# Patient Record
Sex: MALE | Race: WHITE | NOT HISPANIC OR LATINO | Employment: OTHER | URBAN - METROPOLITAN AREA
[De-identification: names, ages, dates, MRNs, and addresses within clinical notes are randomized per-mention and may not be internally consistent; named-entity substitution may affect disease eponyms.]

---

## 2023-02-09 PROBLEM — D16.4 OSTEOMA OF NASAL SINUS: Status: ACTIVE | Noted: 2023-02-09

## 2023-02-09 PROBLEM — H61.21 EXCESSIVE CERUMEN IN EAR CANAL, RIGHT: Status: ACTIVE | Noted: 2023-02-09

## 2023-02-09 PROBLEM — I25.10 CORONARY ARTERY DISEASE: Status: ACTIVE | Noted: 2023-02-09

## 2023-02-09 PROBLEM — K21.9 GERD (GASTROESOPHAGEAL REFLUX DISEASE): Status: ACTIVE | Noted: 2023-02-09

## 2023-02-09 PROBLEM — E55.9 VITAMIN D DEFICIENCY: Status: ACTIVE | Noted: 2023-02-09

## 2023-02-09 PROBLEM — D32.0 CEREBRAL MENINGIOMA (MULTI): Status: ACTIVE | Noted: 2023-02-09

## 2023-02-09 PROBLEM — M79.671 RIGHT FOOT PAIN: Status: ACTIVE | Noted: 2023-02-09

## 2023-02-09 PROBLEM — I87.2 VENOUS INSUFFICIENCY OF BOTH LOWER EXTREMITIES: Status: ACTIVE | Noted: 2023-02-09

## 2023-02-09 PROBLEM — R22.42 SUBCUTANEOUS MASS OF LEFT LOWER EXTREMITY: Status: ACTIVE | Noted: 2023-02-09

## 2023-02-09 PROBLEM — R60.0 BILATERAL EDEMA OF LOWER EXTREMITY: Status: ACTIVE | Noted: 2023-02-09

## 2023-02-09 PROBLEM — E78.5 HYPERLIPIDEMIA: Status: ACTIVE | Noted: 2023-02-09

## 2023-02-09 PROBLEM — R94.31 ABNORMAL EKG: Status: ACTIVE | Noted: 2023-02-09

## 2023-02-09 PROBLEM — G89.29 CHRONIC NECK PAIN: Status: ACTIVE | Noted: 2023-02-09

## 2023-02-09 PROBLEM — M47.816 DJD (DEGENERATIVE JOINT DISEASE), LUMBAR: Status: ACTIVE | Noted: 2023-02-09

## 2023-02-09 PROBLEM — J34.1 MUCOUS RETENTION CYST OF MAXILLARY SINUS: Status: ACTIVE | Noted: 2023-02-09

## 2023-02-09 PROBLEM — I77.819 AORTIC DILATATION (CMS-HCC): Status: ACTIVE | Noted: 2023-02-09

## 2023-02-09 PROBLEM — H93.19 TINNITUS: Status: ACTIVE | Noted: 2023-02-09

## 2023-02-09 PROBLEM — M47.812 DJD (DEGENERATIVE JOINT DISEASE), CERVICAL: Status: ACTIVE | Noted: 2023-02-09

## 2023-02-09 PROBLEM — H90.3 SENSORINEURAL HEARING LOSS (SNHL), BILATERAL: Status: ACTIVE | Noted: 2023-02-09

## 2023-02-09 PROBLEM — I80.02 SUPERFICIAL PHLEBITIS AND THROMBOPHLEBITIS OF LEFT LEG: Status: ACTIVE | Noted: 2023-02-09

## 2023-02-09 PROBLEM — H93.293 ABNORMAL AUDITORY PERCEPTION OF BOTH EARS: Status: ACTIVE | Noted: 2023-02-09

## 2023-02-09 PROBLEM — I10 BENIGN ESSENTIAL HYPERTENSION: Status: ACTIVE | Noted: 2023-02-09

## 2023-02-09 PROBLEM — I77.9 BILATERAL CAROTID ARTERY DISEASE (CMS-HCC): Status: ACTIVE | Noted: 2023-02-09

## 2023-02-09 PROBLEM — E66.9 OBESITY (BMI 30-39.9): Status: ACTIVE | Noted: 2023-02-09

## 2023-02-09 PROBLEM — R42 DIZZINESS: Status: ACTIVE | Noted: 2023-02-09

## 2023-02-09 PROBLEM — H81.10 BENIGN PAROXYSMAL POSITIONAL VERTIGO: Status: ACTIVE | Noted: 2023-02-09

## 2023-02-09 PROBLEM — M54.2 CHRONIC NECK PAIN: Status: ACTIVE | Noted: 2023-02-09

## 2023-02-09 PROBLEM — R73.01 IMPAIRED FASTING GLUCOSE: Status: ACTIVE | Noted: 2023-02-09

## 2023-02-09 RX ORDER — ASPIRIN 81 MG/1
TABLET ORAL
COMMUNITY
Start: 2006-03-08

## 2023-02-09 RX ORDER — ATORVASTATIN CALCIUM 80 MG/1
1 TABLET, FILM COATED ORAL DAILY
COMMUNITY
End: 2023-05-25 | Stop reason: SDUPTHER

## 2023-02-09 RX ORDER — EZETIMIBE 10 MG/1
1 TABLET ORAL DAILY
COMMUNITY
Start: 2021-05-24 | End: 2023-09-26 | Stop reason: SDUPTHER

## 2023-02-09 RX ORDER — METOPROLOL SUCCINATE 25 MG/1
1 TABLET, EXTENDED RELEASE ORAL DAILY
COMMUNITY
End: 2023-09-26 | Stop reason: SDUPTHER

## 2023-02-09 RX ORDER — AMLODIPINE BESYLATE 10 MG/1
10 TABLET ORAL DAILY
COMMUNITY
Start: 2009-03-11 | End: 2023-09-26 | Stop reason: SDUPTHER

## 2023-03-15 LAB
ALANINE AMINOTRANSFERASE (SGPT) (U/L) IN SER/PLAS: 27 U/L (ref 10–52)
ALBUMIN (G/DL) IN SER/PLAS: 4.5 G/DL (ref 3.4–5)
ALKALINE PHOSPHATASE (U/L) IN SER/PLAS: 64 U/L (ref 33–136)
ANION GAP IN SER/PLAS: 13 MMOL/L (ref 10–20)
ASPARTATE AMINOTRANSFERASE (SGOT) (U/L) IN SER/PLAS: 20 U/L (ref 9–39)
BILIRUBIN TOTAL (MG/DL) IN SER/PLAS: 0.9 MG/DL (ref 0–1.2)
CALCIDIOL (25 OH VITAMIN D3) (NG/ML) IN SER/PLAS: 19 NG/ML
CALCIUM (MG/DL) IN SER/PLAS: 9.1 MG/DL (ref 8.6–10.3)
CARBON DIOXIDE, TOTAL (MMOL/L) IN SER/PLAS: 27 MMOL/L (ref 21–32)
CHLORIDE (MMOL/L) IN SER/PLAS: 105 MMOL/L (ref 98–107)
CHOLESTEROL (MG/DL) IN SER/PLAS: 126 MG/DL (ref 0–199)
CHOLESTEROL IN HDL (MG/DL) IN SER/PLAS: 49.3 MG/DL
CHOLESTEROL/HDL RATIO: 2.6
CREATININE (MG/DL) IN SER/PLAS: 0.91 MG/DL (ref 0.5–1.3)
ERYTHROCYTE DISTRIBUTION WIDTH (RATIO) BY AUTOMATED COUNT: 13.2 % (ref 11.5–14.5)
ERYTHROCYTE MEAN CORPUSCULAR HEMOGLOBIN CONCENTRATION (G/DL) BY AUTOMATED: 32.3 G/DL (ref 32–36)
ERYTHROCYTE MEAN CORPUSCULAR VOLUME (FL) BY AUTOMATED COUNT: 97 FL (ref 80–100)
ERYTHROCYTES (10*6/UL) IN BLOOD BY AUTOMATED COUNT: 4.78 X10E12/L (ref 4.5–5.9)
ESTIMATED AVERAGE GLUCOSE FOR HBA1C: 128 MG/DL
GFR MALE: 85 ML/MIN/1.73M2
GLUCOSE (MG/DL) IN SER/PLAS: 109 MG/DL (ref 74–99)
HEMATOCRIT (%) IN BLOOD BY AUTOMATED COUNT: 46.2 % (ref 41–52)
HEMOGLOBIN (G/DL) IN BLOOD: 14.9 G/DL (ref 13.5–17.5)
HEMOGLOBIN A1C/HEMOGLOBIN TOTAL IN BLOOD: 6.1 %
LDL: 58 MG/DL (ref 0–99)
LEUKOCYTES (10*3/UL) IN BLOOD BY AUTOMATED COUNT: 7.7 X10E9/L (ref 4.4–11.3)
PLATELETS (10*3/UL) IN BLOOD AUTOMATED COUNT: 211 X10E9/L (ref 150–450)
POTASSIUM (MMOL/L) IN SER/PLAS: 4.7 MMOL/L (ref 3.5–5.3)
PROSTATE SPECIFIC ANTIGEN,SCREEN: 0.41 NG/ML (ref 0–4)
PROTEIN TOTAL: 7 G/DL (ref 6.4–8.2)
SODIUM (MMOL/L) IN SER/PLAS: 140 MMOL/L (ref 136–145)
THYROTROPIN (MIU/L) IN SER/PLAS BY DETECTION LIMIT <= 0.05 MIU/L: 2.49 MIU/L (ref 0.44–3.98)
TRIGLYCERIDE (MG/DL) IN SER/PLAS: 95 MG/DL (ref 0–149)
UREA NITROGEN (MG/DL) IN SER/PLAS: 13 MG/DL (ref 6–23)
VLDL: 19 MG/DL (ref 0–40)

## 2023-03-22 ENCOUNTER — OFFICE VISIT (OUTPATIENT)
Dept: PRIMARY CARE | Facility: CLINIC | Age: 78
End: 2023-03-22
Payer: MEDICARE

## 2023-03-22 VITALS
HEART RATE: 56 BPM | HEIGHT: 66 IN | DIASTOLIC BLOOD PRESSURE: 74 MMHG | WEIGHT: 215 LBS | OXYGEN SATURATION: 97 % | RESPIRATION RATE: 15 BRPM | TEMPERATURE: 97.1 F | SYSTOLIC BLOOD PRESSURE: 150 MMHG | BODY MASS INDEX: 34.55 KG/M2

## 2023-03-22 DIAGNOSIS — E55.9 VITAMIN D DEFICIENCY: ICD-10-CM

## 2023-03-22 DIAGNOSIS — R73.01 IMPAIRED FASTING GLUCOSE: ICD-10-CM

## 2023-03-22 DIAGNOSIS — E78.2 MIXED HYPERLIPIDEMIA: ICD-10-CM

## 2023-03-22 DIAGNOSIS — D32.0 CEREBRAL MENINGIOMA (MULTI): ICD-10-CM

## 2023-03-22 DIAGNOSIS — I10 BENIGN ESSENTIAL HYPERTENSION: ICD-10-CM

## 2023-03-22 DIAGNOSIS — Z00.00 MEDICARE ANNUAL WELLNESS VISIT, SUBSEQUENT: Primary | ICD-10-CM

## 2023-03-22 DIAGNOSIS — I65.23 BILATERAL CAROTID ARTERY STENOSIS: ICD-10-CM

## 2023-03-22 PROBLEM — M79.671 RIGHT FOOT PAIN: Status: RESOLVED | Noted: 2023-02-09 | Resolved: 2023-03-22

## 2023-03-22 PROBLEM — H61.21 EXCESSIVE CERUMEN IN EAR CANAL, RIGHT: Status: RESOLVED | Noted: 2023-02-09 | Resolved: 2023-03-22

## 2023-03-22 PROCEDURE — 1159F MED LIST DOCD IN RCRD: CPT | Performed by: FAMILY MEDICINE

## 2023-03-22 PROCEDURE — G0439 PPPS, SUBSEQ VISIT: HCPCS | Performed by: FAMILY MEDICINE

## 2023-03-22 PROCEDURE — 1170F FXNL STATUS ASSESSED: CPT | Performed by: FAMILY MEDICINE

## 2023-03-22 PROCEDURE — 1160F RVW MEDS BY RX/DR IN RCRD: CPT | Performed by: FAMILY MEDICINE

## 2023-03-22 PROCEDURE — 3078F DIAST BP <80 MM HG: CPT | Performed by: FAMILY MEDICINE

## 2023-03-22 PROCEDURE — 1036F TOBACCO NON-USER: CPT | Performed by: FAMILY MEDICINE

## 2023-03-22 PROCEDURE — 99214 OFFICE O/P EST MOD 30 MIN: CPT | Performed by: FAMILY MEDICINE

## 2023-03-22 PROCEDURE — 3077F SYST BP >= 140 MM HG: CPT | Performed by: FAMILY MEDICINE

## 2023-03-22 RX ORDER — ACETAMINOPHEN 500 MG
5000 TABLET ORAL DAILY
COMMUNITY

## 2023-03-22 RX ORDER — CHLORTHALIDONE 25 MG/1
25 TABLET ORAL DAILY
Qty: 90 TABLET | Refills: 1 | Status: SHIPPED | OUTPATIENT
Start: 2023-03-22 | End: 2023-09-26 | Stop reason: SDUPTHER

## 2023-03-22 ASSESSMENT — ENCOUNTER SYMPTOMS
CHILLS: 0
OCCASIONAL FEELINGS OF UNSTEADINESS: 0
SHORTNESS OF BREATH: 0
DEPRESSION: 0
CHEST TIGHTNESS: 0
PALPITATIONS: 0
ABDOMINAL PAIN: 0
LOSS OF SENSATION IN FEET: 0
ARTHRALGIAS: 0
FEVER: 0
CONFUSION: 0

## 2023-03-22 ASSESSMENT — PATIENT HEALTH QUESTIONNAIRE - PHQ9
SUM OF ALL RESPONSES TO PHQ9 QUESTIONS 1 AND 2: 0
2. FEELING DOWN, DEPRESSED OR HOPELESS: NOT AT ALL
1. LITTLE INTEREST OR PLEASURE IN DOING THINGS: NOT AT ALL

## 2023-03-22 ASSESSMENT — ACTIVITIES OF DAILY LIVING (ADL)
BATHING: INDEPENDENT
MANAGING_FINANCES: INDEPENDENT
DRESSING: INDEPENDENT
TAKING_MEDICATION: INDEPENDENT
GROCERY_SHOPPING: INDEPENDENT
DOING_HOUSEWORK: INDEPENDENT

## 2023-03-22 NOTE — ASSESSMENT & PLAN NOTE
Patient refuses flu vaccine  Refuses pneumonia vaccine  Refuses colon cancer screening at this time

## 2023-03-22 NOTE — ASSESSMENT & PLAN NOTE
HTN not to goal start chlorthalidone 25 mg once a day and continue other current medication.  Return in 8 weeks for BP recheck

## 2023-03-22 NOTE — PROGRESS NOTES
"Subjective   Reason for Visit: Neftali Schmitt is an 77 y.o. male here for a Medicare Wellness visit.     Past Medical, Surgical, and Family History reviewed and updated in chart.    Reviewed all medications by prescribing practitioner or clinical pharmacist (such as prescriptions, OTCs, herbal therapies and supplements) and documented in the medical record.    HPI patient today for follow-up of ongoing healthcare issues and review of recent lab work.  Overall is been feeling okay denies any headaches or dizziness.  Since he has been in last he has been evaluated by ENT without acute issues noted also has followed up with vascular surgery had a CT angio of the neck to follow-up on abnormalities noted on carotid ultrasound.  Findings stable and he says he needs to follow-up with vascular surgery in 1 year.    Patient Care Team:  Kiran Beard MD as PCP - General  Kiran Beard MD as PCP - [employer] PCP     Review of Systems   Constitutional:  Negative for chills and fever.   HENT:  Negative for congestion and ear pain.    Eyes:  Negative for visual disturbance.   Respiratory:  Negative for chest tightness and shortness of breath.    Cardiovascular:  Negative for chest pain and palpitations.   Gastrointestinal:  Negative for abdominal pain.   Musculoskeletal:  Negative for arthralgias.   Skin:  Negative for pallor.   Psychiatric/Behavioral:  Negative for confusion.        Objective   Vitals:  Pulse 56   Temp 36.2 °C (97.1 °F)   Resp 15   Ht 1.676 m (5' 6\")   Wt 97.5 kg (215 lb)   SpO2 97%   BMI 34.70 kg/m²       Physical Exam  Vitals and nursing note reviewed.   Constitutional:       General: He is not in acute distress.     Appearance: Normal appearance. He is not ill-appearing.   HENT:      Head: Normocephalic and atraumatic.      Right Ear: Tympanic membrane, ear canal and external ear normal.      Left Ear: Tympanic membrane, ear canal and external ear normal.      Mouth/Throat:     "  Pharynx: Oropharynx is clear.   Eyes:      Extraocular Movements: Extraocular movements intact.   Cardiovascular:      Rate and Rhythm: Normal rate and regular rhythm.      Pulses: Normal pulses.      Heart sounds: Normal heart sounds.   Pulmonary:      Effort: Pulmonary effort is normal.      Breath sounds: Normal breath sounds.   Abdominal:      General: Abdomen is flat. Bowel sounds are normal.      Palpations: Abdomen is soft.      Tenderness: There is no abdominal tenderness.   Musculoskeletal:         General: Normal range of motion.      Cervical back: Neck supple.   Skin:     General: Skin is warm.   Neurological:      Mental Status: He is alert and oriented to person, place, and time. Mental status is at baseline.   Psychiatric:         Mood and Affect: Mood normal.       Return to office 8 weeks for blood pressure recheck  Return to office 6 months with repeat fasting labs  Continue to follow with specialist including vascular surgery and cardiology  Recent labs and imaging studies were reviewed with the patient in detail and all questions were answered.  Assessment/Plan   Problem List Items Addressed This Visit       Benign essential hypertension    Current Assessment & Plan     HTN not to goal start chlorthalidone 25 mg once a day and continue other current medication.  Return in 8 weeks for BP recheck         Relevant Orders    Follow Up In Primary Care    Follow Up In Primary Care    Comprehensive Metabolic Panel    Cerebral meningioma (CMS/HCC)    Overview     Head CT 1/11/2023 stable         Current Assessment & Plan     Clinically stable recent CT of the head 1/11/2023 stable         Hyperlipidemia    Current Assessment & Plan     Lipids stable continue atorvastatin and Zetia along with dietary modification         Relevant Orders    Comprehensive Metabolic Panel    Lipid Panel    Impaired fasting glucose    Current Assessment & Plan     A1c 6.1% continue dietary modification         Relevant  Orders    Comprehensive Metabolic Panel    Hemoglobin A1C    Vitamin D deficiency    Current Assessment & Plan     D deficiency start vitamin D3 5000 units daily         Relevant Orders    Vitamin D 1,25 Dihydroxy    Bilateral carotid artery disease (CMS/HCC)    Overview     Bilateral carotid ultrasound 1/24/2023  CT angio of neck 2/21/2023  Patient follows with vascular surgery/Dr. Inman         Current Assessment & Plan     Clinically stable following with vascular surgery  Bilateral carotid ultrasound 1/24/2023 showing evidence of plaque  CT angio of the neck 2/21/2023 showing evidence of stenosis evaluated by vascular surgery.  Patient to follow-up with vascular surgery with follow-up studies in 1 year         Medicare annual wellness visit, subsequent - Primary    Current Assessment & Plan     Patient refuses flu vaccine  Refuses pneumonia vaccine  Refuses colon cancer screening at this time

## 2023-03-22 NOTE — ASSESSMENT & PLAN NOTE
Clinically stable following with vascular surgery  Bilateral carotid ultrasound 1/24/2023 showing evidence of plaque  CT angio of the neck 2/21/2023 showing evidence of stenosis evaluated by vascular surgery.  Patient to follow-up with vascular surgery with follow-up studies in 1 year

## 2023-05-22 ENCOUNTER — OFFICE VISIT (OUTPATIENT)
Dept: PRIMARY CARE | Facility: CLINIC | Age: 78
End: 2023-05-22
Payer: MEDICARE

## 2023-05-22 VITALS
BODY MASS INDEX: 34.86 KG/M2 | OXYGEN SATURATION: 97 % | HEART RATE: 57 BPM | SYSTOLIC BLOOD PRESSURE: 141 MMHG | WEIGHT: 216 LBS | TEMPERATURE: 97.1 F | DIASTOLIC BLOOD PRESSURE: 65 MMHG | RESPIRATION RATE: 14 BRPM

## 2023-05-22 DIAGNOSIS — I10 BENIGN ESSENTIAL HYPERTENSION: Primary | ICD-10-CM

## 2023-05-22 DIAGNOSIS — G56.03 BILATERAL CARPAL TUNNEL SYNDROME: ICD-10-CM

## 2023-05-22 PROCEDURE — 1036F TOBACCO NON-USER: CPT | Performed by: FAMILY MEDICINE

## 2023-05-22 PROCEDURE — 99213 OFFICE O/P EST LOW 20 MIN: CPT | Performed by: FAMILY MEDICINE

## 2023-05-22 PROCEDURE — 1160F RVW MEDS BY RX/DR IN RCRD: CPT | Performed by: FAMILY MEDICINE

## 2023-05-22 PROCEDURE — 3077F SYST BP >= 140 MM HG: CPT | Performed by: FAMILY MEDICINE

## 2023-05-22 PROCEDURE — 3078F DIAST BP <80 MM HG: CPT | Performed by: FAMILY MEDICINE

## 2023-05-22 PROCEDURE — 1159F MED LIST DOCD IN RCRD: CPT | Performed by: FAMILY MEDICINE

## 2023-05-22 ASSESSMENT — ENCOUNTER SYMPTOMS
CHILLS: 0
PALPITATIONS: 0
CHEST TIGHTNESS: 0
NUMBNESS: 1
ARTHRALGIAS: 0
ABDOMINAL PAIN: 0
FEVER: 0
SHORTNESS OF BREATH: 0
CONFUSION: 0

## 2023-05-22 NOTE — PROGRESS NOTES
Subjective   Patient ID: Neftali Schmitt is a 77 y.o. male who presents for Follow-up (8 week B/P).    HPI   Patient today for follow-up on his blood pressure states he is tolerating medications he is monitoring his blood pressure at home typically gets in the 130s over 70s.  Occasionally he says he will see a 140 over 70s.  Related to this he has been noticing problems with numbness and tingling in both of his hands mainly his thumb index and middle fingers bilaterally.  He is left-hand dominant.  Review of Systems   Constitutional:  Negative for chills and fever.   HENT:  Negative for congestion and ear pain.    Eyes:  Negative for visual disturbance.   Respiratory:  Negative for chest tightness and shortness of breath.    Cardiovascular:  Negative for chest pain and palpitations.   Gastrointestinal:  Negative for abdominal pain.   Musculoskeletal:  Negative for arthralgias.   Skin:  Negative for pallor.   Neurological:  Positive for numbness.   Psychiatric/Behavioral:  Negative for confusion.        Objective   /65   Pulse 57   Temp 36.2 °C (97.1 °F)   Resp 14   Wt 98 kg (216 lb)   SpO2 97%   BMI 34.86 kg/m²     Physical Exam  Constitutional:       Appearance: Normal appearance.   HENT:      Head: Normocephalic.      Nose: No congestion.   Cardiovascular:      Rate and Rhythm: Normal rate and regular rhythm.      Pulses: Normal pulses.      Heart sounds: Normal heart sounds.   Pulmonary:      Effort: Pulmonary effort is normal. No respiratory distress.      Breath sounds: Normal breath sounds.   Musculoskeletal:         General: Tenderness present.      Comments: Positive Tinel sign bilaterally at the level of the wrists.   Neurological:      Mental Status: He is alert. Mental status is at baseline.       Blood pressure improved we will continue current antibiotic hypertensive medication.  Bilateral upper extremity EMG further plans pending results likely suspecting carpal tunnel  syndrome.  Follow-up appointment in September with repeat fasting labs  Assessment/Plan   Problem List Items Addressed This Visit       Benign essential hypertension - Primary     BP improved continue current antihypertensive medications and continue blood pressure log.  Bring blood pressure cuff and readings to next appointment         Bilateral carpal tunnel syndrome     Bilateral upper extremity EMG further plans pending results.         Relevant Orders    EMG & nerve conduction

## 2023-05-22 NOTE — ASSESSMENT & PLAN NOTE
BP improved continue current antihypertensive medications and continue blood pressure log.  Bring blood pressure cuff and readings to next appointment

## 2023-05-25 ENCOUNTER — TELEPHONE (OUTPATIENT)
Dept: PRIMARY CARE | Facility: CLINIC | Age: 78
End: 2023-05-25
Payer: MEDICARE

## 2023-05-25 DIAGNOSIS — E78.49 OTHER HYPERLIPIDEMIA: ICD-10-CM

## 2023-05-25 RX ORDER — ATORVASTATIN CALCIUM 80 MG/1
80 TABLET, FILM COATED ORAL DAILY
Qty: 90 TABLET | Refills: 3 | Status: SHIPPED | OUTPATIENT
Start: 2023-05-25

## 2023-05-25 NOTE — TELEPHONE ENCOUNTER
Rx Refill Request Telephone Encounter    Name:  Neftali Schmitt  :  [id number]  Medication Name:        Atorvastatin Calcium 80 MG Oral Tablet 1 tab taken daily                Specific Pharmacy location:   [facility] in [place]    Date of last appointment:  2023  Date of next appointment:  2023  Best number to reach patient:  [phone]

## 2023-09-20 ENCOUNTER — LAB (OUTPATIENT)
Dept: LAB | Facility: LAB | Age: 78
End: 2023-09-20
Payer: MEDICARE

## 2023-09-20 DIAGNOSIS — E78.2 MIXED HYPERLIPIDEMIA: ICD-10-CM

## 2023-09-20 DIAGNOSIS — I10 BENIGN ESSENTIAL HYPERTENSION: ICD-10-CM

## 2023-09-20 DIAGNOSIS — R73.01 IMPAIRED FASTING GLUCOSE: ICD-10-CM

## 2023-09-20 DIAGNOSIS — E55.9 VITAMIN D DEFICIENCY: ICD-10-CM

## 2023-09-20 LAB
ALANINE AMINOTRANSFERASE (SGPT) (U/L) IN SER/PLAS: 47 U/L (ref 10–52)
ALBUMIN (G/DL) IN SER/PLAS: 4.5 G/DL (ref 3.4–5)
ALKALINE PHOSPHATASE (U/L) IN SER/PLAS: 69 U/L (ref 33–136)
ANION GAP IN SER/PLAS: 11 MMOL/L (ref 10–20)
ASPARTATE AMINOTRANSFERASE (SGOT) (U/L) IN SER/PLAS: 29 U/L (ref 9–39)
BILIRUBIN TOTAL (MG/DL) IN SER/PLAS: 1 MG/DL (ref 0–1.2)
CALCIUM (MG/DL) IN SER/PLAS: 9.3 MG/DL (ref 8.6–10.3)
CARBON DIOXIDE, TOTAL (MMOL/L) IN SER/PLAS: 32 MMOL/L (ref 21–32)
CHLORIDE (MMOL/L) IN SER/PLAS: 102 MMOL/L (ref 98–107)
CHOLESTEROL (MG/DL) IN SER/PLAS: 147 MG/DL (ref 0–199)
CHOLESTEROL IN HDL (MG/DL) IN SER/PLAS: 49 MG/DL
CHOLESTEROL/HDL RATIO: 3
CREATININE (MG/DL) IN SER/PLAS: 0.99 MG/DL (ref 0.5–1.3)
ESTIMATED AVERAGE GLUCOSE FOR HBA1C: 140 MG/DL
GFR MALE: 77 ML/MIN/1.73M2
GLUCOSE (MG/DL) IN SER/PLAS: 110 MG/DL (ref 74–99)
HEMOGLOBIN A1C/HEMOGLOBIN TOTAL IN BLOOD: 6.5 %
LDL: 72 MG/DL (ref 0–99)
POTASSIUM (MMOL/L) IN SER/PLAS: 4.2 MMOL/L (ref 3.5–5.3)
PROTEIN TOTAL: 6.7 G/DL (ref 6.4–8.2)
SODIUM (MMOL/L) IN SER/PLAS: 141 MMOL/L (ref 136–145)
TRIGLYCERIDE (MG/DL) IN SER/PLAS: 128 MG/DL (ref 0–149)
UREA NITROGEN (MG/DL) IN SER/PLAS: 17 MG/DL (ref 6–23)
VLDL: 26 MG/DL (ref 0–40)

## 2023-09-20 PROCEDURE — 36415 COLL VENOUS BLD VENIPUNCTURE: CPT

## 2023-09-20 PROCEDURE — 83036 HEMOGLOBIN GLYCOSYLATED A1C: CPT

## 2023-09-20 PROCEDURE — 80053 COMPREHEN METABOLIC PANEL: CPT

## 2023-09-20 PROCEDURE — 82652 VIT D 1 25-DIHYDROXY: CPT

## 2023-09-20 PROCEDURE — 80061 LIPID PANEL: CPT

## 2023-09-23 LAB — VITAMIN D 1,25-DIHYDROXY: 23.5 PG/ML (ref 19.9–79.3)

## 2023-09-26 ENCOUNTER — OFFICE VISIT (OUTPATIENT)
Dept: PRIMARY CARE | Facility: CLINIC | Age: 78
End: 2023-09-26
Payer: MEDICARE

## 2023-09-26 VITALS
SYSTOLIC BLOOD PRESSURE: 138 MMHG | TEMPERATURE: 97.5 F | RESPIRATION RATE: 14 BRPM | HEART RATE: 59 BPM | DIASTOLIC BLOOD PRESSURE: 86 MMHG | OXYGEN SATURATION: 96 % | BODY MASS INDEX: 35.67 KG/M2 | WEIGHT: 221 LBS

## 2023-09-26 DIAGNOSIS — E11.9 DIABETES MELLITUS TYPE 2, DIET-CONTROLLED (MULTI): Primary | ICD-10-CM

## 2023-09-26 DIAGNOSIS — E78.2 MIXED HYPERLIPIDEMIA: ICD-10-CM

## 2023-09-26 DIAGNOSIS — G56.03 BILATERAL CARPAL TUNNEL SYNDROME: ICD-10-CM

## 2023-09-26 DIAGNOSIS — I10 BENIGN ESSENTIAL HYPERTENSION: ICD-10-CM

## 2023-09-26 DIAGNOSIS — Z13.29 THYROID DISORDER SCREEN: ICD-10-CM

## 2023-09-26 DIAGNOSIS — I77.819 AORTIC DILATATION (CMS-HCC): ICD-10-CM

## 2023-09-26 DIAGNOSIS — E66.01 SEVERE OBESITY (BMI 35.0-39.9) WITH COMORBIDITY (MULTI): ICD-10-CM

## 2023-09-26 DIAGNOSIS — E55.9 VITAMIN D DEFICIENCY: ICD-10-CM

## 2023-09-26 DIAGNOSIS — Z12.5 SCREENING FOR PROSTATE CANCER: ICD-10-CM

## 2023-09-26 PROBLEM — R73.01 IMPAIRED FASTING GLUCOSE: Status: RESOLVED | Noted: 2023-02-09 | Resolved: 2023-09-26

## 2023-09-26 PROBLEM — I80.02 SUPERFICIAL PHLEBITIS AND THROMBOPHLEBITIS OF LEFT LEG: Status: RESOLVED | Noted: 2023-02-09 | Resolved: 2023-09-26

## 2023-09-26 PROBLEM — R60.0 BILATERAL EDEMA OF LOWER EXTREMITY: Status: RESOLVED | Noted: 2023-02-09 | Resolved: 2023-09-26

## 2023-09-26 PROBLEM — H93.293 ABNORMAL AUDITORY PERCEPTION OF BOTH EARS: Status: RESOLVED | Noted: 2023-02-09 | Resolved: 2023-09-26

## 2023-09-26 PROCEDURE — 1036F TOBACCO NON-USER: CPT | Performed by: FAMILY MEDICINE

## 2023-09-26 PROCEDURE — 1159F MED LIST DOCD IN RCRD: CPT | Performed by: FAMILY MEDICINE

## 2023-09-26 PROCEDURE — 1160F RVW MEDS BY RX/DR IN RCRD: CPT | Performed by: FAMILY MEDICINE

## 2023-09-26 PROCEDURE — 3075F SYST BP GE 130 - 139MM HG: CPT | Performed by: FAMILY MEDICINE

## 2023-09-26 PROCEDURE — 3079F DIAST BP 80-89 MM HG: CPT | Performed by: FAMILY MEDICINE

## 2023-09-26 PROCEDURE — 99214 OFFICE O/P EST MOD 30 MIN: CPT | Performed by: FAMILY MEDICINE

## 2023-09-26 RX ORDER — CHLORTHALIDONE 25 MG/1
25 TABLET ORAL DAILY
Qty: 90 TABLET | Refills: 3 | Status: SHIPPED | OUTPATIENT
Start: 2023-09-26 | End: 2024-09-25

## 2023-09-26 RX ORDER — METOPROLOL SUCCINATE 25 MG/1
25 TABLET, EXTENDED RELEASE ORAL DAILY
Qty: 90 TABLET | Refills: 3 | Status: SHIPPED | OUTPATIENT
Start: 2023-09-26 | End: 2024-09-25

## 2023-09-26 RX ORDER — AMLODIPINE BESYLATE 10 MG/1
10 TABLET ORAL DAILY
Qty: 90 TABLET | Refills: 3 | Status: SHIPPED | OUTPATIENT
Start: 2023-09-26 | End: 2024-09-25

## 2023-09-26 RX ORDER — EZETIMIBE 10 MG/1
10 TABLET ORAL DAILY
Qty: 90 TABLET | Refills: 3 | Status: SHIPPED | OUTPATIENT
Start: 2023-09-26 | End: 2024-09-25

## 2023-09-26 RX ORDER — MECLIZINE HYDROCHLORIDE 25 MG/1
TABLET ORAL
COMMUNITY
Start: 2022-12-07

## 2023-09-26 ASSESSMENT — ENCOUNTER SYMPTOMS
PALPITATIONS: 0
ABDOMINAL PAIN: 0
CONFUSION: 0
ARTHRALGIAS: 0
CHILLS: 0
CHEST TIGHTNESS: 0
SHORTNESS OF BREATH: 0
FEVER: 0

## 2023-09-26 NOTE — PROGRESS NOTES
Subjective   Patient ID: Neftali Schmitt is a 78 y.o. male who presents for Follow-up (6 month).    HPI   Patient today for follow-up of ongoing healthcare issues and review of lab work for most part states he has been doing okay.  Does his carpal tunnel symptoms are stable.  He has had some difficulty trying to get an appointment due to availability.  Review of Systems   Constitutional:  Negative for chills and fever.   HENT:  Negative for congestion and ear pain.    Eyes:  Negative for visual disturbance.   Respiratory:  Negative for chest tightness and shortness of breath.    Cardiovascular:  Negative for chest pain and palpitations.   Gastrointestinal:  Negative for abdominal pain.   Musculoskeletal:  Negative for arthralgias.   Skin:  Negative for pallor.   Psychiatric/Behavioral:  Negative for confusion.        Objective   /86   Pulse 59   Temp 36.4 °C (97.5 °F)   Resp 14   Wt 100 kg (221 lb)   SpO2 96%   BMI 35.67 kg/m²     Physical Exam  Vitals and nursing note reviewed.   Constitutional:       General: He is not in acute distress.     Appearance: Normal appearance. He is not ill-appearing.   HENT:      Head: Normocephalic and atraumatic.      Right Ear: Tympanic membrane, ear canal and external ear normal.      Left Ear: Tympanic membrane, ear canal and external ear normal.      Mouth/Throat:      Pharynx: Oropharynx is clear.   Eyes:      Extraocular Movements: Extraocular movements intact.   Cardiovascular:      Rate and Rhythm: Normal rate and regular rhythm.      Pulses: Normal pulses.      Heart sounds: Normal heart sounds.   Pulmonary:      Effort: Pulmonary effort is normal.      Breath sounds: Normal breath sounds.   Abdominal:      General: Abdomen is flat. Bowel sounds are normal.      Palpations: Abdomen is soft.      Tenderness: There is no abdominal tenderness.   Musculoskeletal:         General: Normal range of motion.      Cervical back: Neck supple.   Skin:     General: Skin  is warm.   Neurological:      Mental Status: He is alert and oriented to person, place, and time. Mental status is at baseline.   Psychiatric:         Mood and Affect: Mood normal.       Recent Results (from the past 1008 hour(s))   Comprehensive Metabolic Panel    Collection Time: 09/20/23  7:07 AM   Result Value Ref Range    Glucose 110 (H) 74 - 99 mg/dL    Sodium 141 136 - 145 mmol/L    Potassium 4.2 3.5 - 5.3 mmol/L    Chloride 102 98 - 107 mmol/L    Bicarbonate 32 21 - 32 mmol/L    Anion Gap 11 10 - 20 mmol/L    Urea Nitrogen 17 6 - 23 mg/dL    Creatinine 0.99 0.50 - 1.30 mg/dL    GFR MALE 77 >90 mL/min/1.73m2    Calcium 9.3 8.6 - 10.3 mg/dL    Albumin 4.5 3.4 - 5.0 g/dL    Alkaline Phosphatase 69 33 - 136 U/L    Total Protein 6.7 6.4 - 8.2 g/dL    AST 29 9 - 39 U/L    Total Bilirubin 1.0 0.0 - 1.2 mg/dL    ALT (SGPT) 47 10 - 52 U/L   Hemoglobin A1C    Collection Time: 09/20/23  7:07 AM   Result Value Ref Range    Hemoglobin A1C 6.5 (A) %    Estimated Average Glucose 140 MG/DL   Lipid Panel    Collection Time: 09/20/23  7:07 AM   Result Value Ref Range    Cholesterol 147 0 - 199 mg/dL    HDL 49.0 mg/dL    Cholesterol/HDL Ratio 3.0     LDL 72 0 - 99 mg/dL    VLDL 26 0 - 40 mg/dL    Triglycerides 128 0 - 149 mg/dL   Vitamin D 1,25 Dihydroxy    Collection Time: 09/20/23  7:07 AM   Result Value Ref Range    Vit D, 1,25-Dihydroxy 23.5 19.9 - 79.3 pg/mL     Recent labs reviewed with patient  Necessary refills provided    We reviewed recommendations with regards to immunizations that he is eligible for he verbalizes understanding but refuses any and all immunizations at this time    Declines colon cancer screening despite understanding potential line malignancy which lead to his death    Return to office 6 months with repeat fasting labs    Assessment/Plan   Problem List Items Addressed This Visit             ICD-10-CM    Aortic dilatation (CMS/HCC) I77.819     Mild dilatation by previous echo clinically stable  follows with cardiology         Benign essential hypertension I10     Continue current medications refills provided         Relevant Medications    amLODIPine (Norvasc) 10 mg tablet    ezetimibe (Zetia) 10 mg tablet    metoprolol succinate XL (Toprol-XL) 25 mg 24 hr tablet    chlorthalidone (Hygroton) 25 mg tablet    Other Relevant Orders    Follow Up In Primary Care - Established    CBC    Comprehensive Metabolic Panel    Hyperlipidemia E78.5     Continue Lipitor 80 mg daily along with Zetia 10 mg daily and dietary modifications         Relevant Orders    Follow Up In Primary Care - Established    Comprehensive Metabolic Panel    Lipid Panel    Vitamin D deficiency E55.9     Continue to monitor supplement as needed         Relevant Orders    Comprehensive Metabolic Panel    Vitamin D 25-Hydroxy,Total (for eval of Vitamin D levels)    Bilateral carpal tunnel syndrome G56.03     Patient has had difficulty scheduling EMG additional information provided for patient of alternative sites for testing.  Symptomatically says things are stable.         Screening for prostate cancer Z12.5     Screening PSA         Relevant Orders    Prostate Specific Antigen, Screen    Thyroid disorder screen Z13.29     TSH with reflex         Relevant Orders    TSH with reflex to Free T4 if abnormal    Diabetes mellitus type 2, diet-controlled (CMS/[facility]) - Primary E11.9     A1c 6.5% stressed the importance of following diabetic diet which was reviewed with patient         Relevant Orders    Follow Up In Primary Care - Established    CBC    Comprehensive Metabolic Panel    Hemoglobin A1C    Severe obesity (BMI 35.0-39.9) with comorbidity (CMS/HCC) E66.01     Reviewed lifestyle modifications with regards to dietary adjustments and weight loss strategies

## 2023-10-09 ENCOUNTER — ANCILLARY PROCEDURE (OUTPATIENT)
Dept: RADIOLOGY | Facility: CLINIC | Age: 78
End: 2023-10-09
Payer: MEDICARE

## 2023-10-09 ENCOUNTER — TELEPHONE (OUTPATIENT)
Dept: PRIMARY CARE | Facility: CLINIC | Age: 78
End: 2023-10-09
Payer: MEDICARE

## 2023-10-09 DIAGNOSIS — M25.562 ACUTE PAIN OF LEFT KNEE: Primary | ICD-10-CM

## 2023-10-09 DIAGNOSIS — M25.562 ACUTE PAIN OF LEFT KNEE: ICD-10-CM

## 2023-10-09 PROCEDURE — 73562 X-RAY EXAM OF KNEE 3: CPT | Mod: LEFT SIDE | Performed by: RADIOLOGY

## 2023-10-09 PROCEDURE — 73562 X-RAY EXAM OF KNEE 3: CPT | Mod: LT

## 2023-10-09 NOTE — TELEPHONE ENCOUNTER
Patient called in regarding his left knee. He said he had some shooting pain right below the left knee cap and is requesting an xray order to get this looked at. Please advise.

## 2023-12-07 ENCOUNTER — OFFICE VISIT (OUTPATIENT)
Dept: CARDIOLOGY | Facility: CLINIC | Age: 78
End: 2023-12-07
Payer: MEDICARE

## 2023-12-07 VITALS
HEART RATE: 60 BPM | BODY MASS INDEX: 34.61 KG/M2 | WEIGHT: 221 LBS | DIASTOLIC BLOOD PRESSURE: 52 MMHG | SYSTOLIC BLOOD PRESSURE: 130 MMHG

## 2023-12-07 DIAGNOSIS — I10 BENIGN ESSENTIAL HYPERTENSION: ICD-10-CM

## 2023-12-07 DIAGNOSIS — G56.03 BILATERAL CARPAL TUNNEL SYNDROME: ICD-10-CM

## 2023-12-07 DIAGNOSIS — I77.9 BILATERAL CAROTID ARTERY DISEASE, UNSPECIFIED TYPE (CMS-HCC): ICD-10-CM

## 2023-12-07 DIAGNOSIS — E78.2 MIXED HYPERLIPIDEMIA: ICD-10-CM

## 2023-12-07 DIAGNOSIS — I25.10 CORONARY ARTERY DISEASE INVOLVING NATIVE CORONARY ARTERY OF NATIVE HEART WITHOUT ANGINA PECTORIS: Primary | ICD-10-CM

## 2023-12-07 PROCEDURE — 3075F SYST BP GE 130 - 139MM HG: CPT | Performed by: PHYSICIAN ASSISTANT

## 2023-12-07 PROCEDURE — 1160F RVW MEDS BY RX/DR IN RCRD: CPT | Performed by: PHYSICIAN ASSISTANT

## 2023-12-07 PROCEDURE — 99213 OFFICE O/P EST LOW 20 MIN: CPT | Performed by: PHYSICIAN ASSISTANT

## 2023-12-07 PROCEDURE — 1159F MED LIST DOCD IN RCRD: CPT | Performed by: PHYSICIAN ASSISTANT

## 2023-12-07 PROCEDURE — 3078F DIAST BP <80 MM HG: CPT | Performed by: PHYSICIAN ASSISTANT

## 2023-12-07 PROCEDURE — 1036F TOBACCO NON-USER: CPT | Performed by: PHYSICIAN ASSISTANT

## 2023-12-07 ASSESSMENT — ENCOUNTER SYMPTOMS
VOMITING: 0
NAUSEA: 0
SHORTNESS OF BREATH: 0
ORTHOPNEA: 0
WHEEZING: 0
ABDOMINAL PAIN: 0
PALPITATIONS: 0
DIARRHEA: 0
FEVER: 0
DYSURIA: 0
WEAKNESS: 0

## 2023-12-07 NOTE — PATIENT INSTRUCTIONS
Please continue your current medications.  If you have any change in your cardiorespiratory status please call the office right away.  We will arrange for your yearly visit with your cardiologist.

## 2023-12-07 NOTE — PROGRESS NOTES
Cardiology Follow Up  Chief Complaint:   Patient is here for 6 month office visit.      History Of Present Illness:    This is a 77-year-old male here for follow-up regarding his history of coronary artery disease status post bypass done in 2007, carotid/vertebral artery disease followed by vascular surgery, hypertension, dyslipidemia, lower extremity edema, and obesity. The patient was last evaluated by me in December 2022 at which time I asked that he follow-up in 6 months. Patient was subsequently seen by vascular surgery in February 2023 at which time a CTA of the neck was ordered. CBC done in March 2023 showed a hemoglobin of 14.9, TSH was 2.49, hemoglobin A1c was 6.1%, CMP showed normal serum sodium and potassium with a serum creatinine 0.91. Lipid panel done in March 2023 showed an LDL cholesterol of 58 and triglycerides of 95 while on atorvastatin 80 mg daily and Zetia 10 mg daily. CTA of the neck done in February 2023 showed complex fibrocalcific plaque noted in the left carotid bifurcation/proximal internal carotid artery causing 35 to 40% narrowing, focal ectasia of the left proximal internal carotid artery of up to 9.6 mm, fibrocalcific plaque at the origins of the vertebral arteries causing moderate to severe narrowing on the right side and moderate narrowing on the left side. ECG done today showed sinus rhythm with a heart rate of 60 bpm, LVH and possible inferior infarct age undetermined. The patient reports that he has been feeling generally well from the cardiac standpoint. He denies any new chest pain, shortness of breath, palpitations and lightheadedness. He states that he takes all of his medications as prescribed. During my exam, he was resting comfortably on the exam table.   12-7-23:    Is a very pleasant 78-year-old known to Dr. Stewart.  Above history as noted.  Patient states in the interval 6 months since June doing very well without chest pain palpitations shortness of breath lightheadedness  dizziness syncope or falling.  Biggest complaint seems to be related to numbness tingling and discomfort especially later in the evening as he thinks he has carpal tunnel syndrome.  Patient has been taking his medication as prescribed with controlled blood pressures at home.  Patient denies any other new cardiac complaints or concerns and feels otherwise that he has been in his usual state of health.  Last Recorded Vitals:  Vitals:    12/07/23 0901   BP: 130/52   Pulse: 60   Weight: 100 kg (221 lb)       Past Medical History:  He has a past medical history of Abnormal auditory perception of both ears (02/09/2023), Immunization not carried out because of patient refusal, Personal history of other diseases of the circulatory system, Personal history of other diseases of the musculoskeletal system and connective tissue, and Personal history of other drug therapy.    Past Surgical History:  He has a past surgical history that includes Other surgical history (07/17/2020) and CT angio neck (2/21/2023).      Social History:  He reports that he quit smoking about 33 years ago. His smoking use included cigarettes. He has been exposed to tobacco smoke. He has never used smokeless tobacco. He reports current alcohol use of about 2.0 standard drinks of alcohol per week. He reports that he does not use drugs.    Family History:  Family History   Problem Relation Name Age of Onset    Cancer Mother      Cancer Father          Allergies:  Losartan    Outpatient Medications:  Current Outpatient Medications   Medication Instructions    amLODIPine (NORVASC) 10 mg, oral, Daily    aspirin 81 mg EC tablet     atorvastatin (LIPITOR) 80 mg, oral, Daily    chlorthalidone (HYGROTON) 25 mg, oral, Daily    cholecalciferol (VITAMIN D3) 5,000 Units, oral, Daily    ezetimibe (ZETIA) 10 mg, oral, Daily    meclizine (Antivert) 25 mg tablet     metoprolol succinate XL (TOPROL-XL) 25 mg, oral, Daily     Review of Systems   Constitutional: Negative  for fever and malaise/fatigue.   Cardiovascular:  Negative for chest pain, orthopnea and palpitations.   Respiratory:  Negative for shortness of breath and wheezing.    Skin:  Negative for itching and rash.   Musculoskeletal:         Symptoms of carpal tunnel   Gastrointestinal:  Negative for abdominal pain, diarrhea, nausea and vomiting.   Genitourinary:  Negative for dysuria.   Neurological:  Negative for weakness.      Physical Exam  Constitutional:       General: He is not in acute distress.     Appearance: Normal appearance.   HENT:      Mouth/Throat:      Mouth: Mucous membranes are moist.   Neck:      Comments: No JVD  Cardiovascular:      Rate and Rhythm: Normal rate and regular rhythm.      Heart sounds: Normal heart sounds. No murmur heard.  Abdominal:      General: Abdomen is flat. Bowel sounds are normal.      Palpations: Abdomen is soft.   Musculoskeletal:         General: No swelling.   Skin:     General: Skin is warm and dry.   Neurological:      Mental Status: He is alert and oriented to person, place, and time.   Psychiatric:         Mood and Affect: Mood normal.           Last Labs:  CBC -  Lab Results   Component Value Date    WBC 7.7 03/15/2023    HGB 14.9 03/15/2023    HCT 46.2 03/15/2023    MCV 97 03/15/2023     03/15/2023       CMP -  Lab Results   Component Value Date    CALCIUM 9.3 09/20/2023    PROT 6.7 09/20/2023    ALBUMIN 4.5 09/20/2023    AST 29 09/20/2023    ALT 47 09/20/2023    ALKPHOS 69 09/20/2023    BILITOT 1.0 09/20/2023       LIPID PANEL -   Lab Results   Component Value Date    CHOL 147 09/20/2023    TRIG 128 09/20/2023    HDL 49.0 09/20/2023    CHHDL 3.0 09/20/2023    LDLF 72 09/20/2023    VLDL 26 09/20/2023       RENAL FUNCTION PANEL -   Lab Results   Component Value Date    GLUCOSE 110 (H) 09/20/2023     09/20/2023    K 4.2 09/20/2023     09/20/2023    CO2 32 09/20/2023    ANIONGAP 11 09/20/2023    BUN 17 09/20/2023    CREATININE 0.99 09/20/2023    GFRMALE  77 09/20/2023    CALCIUM 9.3 09/20/2023    ALBUMIN 4.5 09/20/2023        Lab Results   Component Value Date    HGBA1C 6.5 (A) 09/20/2023       Last Cardiology Tests:    Echo: 8/21--CONCLUSIONS:   1. The left ventricular systolic function is normal with a 60-65% estimated ejection fraction.         Lab review: I have personally reviewed the laboratory result(s).    Assessment/Plan   Problem List Items Addressed This Visit             ICD-10-CM       Cardiac and Vasculature    Benign essential hypertension I10    Relevant Orders    Follow Up In Cardiology    CAD (coronary artery disease) - Primary I25.10    Relevant Orders    Follow Up In Cardiology    Hyperlipidemia E78.5    Bilateral carotid artery disease (CMS/HCC) I77.9       Neuro    Bilateral carpal tunnel syndrome G56.03    Relevant Orders    Referral to Orthopaedic Surgery   History of CAD--patient with remote bypass surgery back in 2007.  Had a very active summer without exertional complaints or concerns such as chest pain palpitations or shortness of breath.  Blood pressures are controlled on current meds which we will continue.  Patient had labs in September showing total cholesterol of 147 with LDL cholesterol of 72 on current meds which we will continue.  Patient is known to have preserved LV systolic function.  He has no signs or symptoms of CHF.  Hypertension--again blood pressures are very well-controlled at this time.  Continue current medical therapy  History of carotid arterial disease--patient's last CTA in February showed 35 to 40% narrowing on the left side.  He is on good medical regimen with controlled blood pressures.  He has no neurologic symptoms.  Hyperlipidemia--again lipids well-controlled.  He is a non-smoker.  Symptoms of carpal tunnel syndrome--will refer to Dr. Eze Zamorano of orthopedics for evaluation.  Overall this patient is doing very well and in the last 6 months denies any change in cardiorespiratory status.  If there is any  change she will contact the office otherwise we will arrange follow-up with Dr. Stewart in 6 months time.      Eze Langston PA-C  12/7/2023  9:10 AM

## 2023-12-28 ENCOUNTER — TELEPHONE (OUTPATIENT)
Dept: PRIMARY CARE | Facility: CLINIC | Age: 78
End: 2023-12-28
Payer: MEDICARE

## 2023-12-28 DIAGNOSIS — U07.1 COVID: Primary | ICD-10-CM

## 2023-12-28 RX ORDER — PROMETHAZINE HYDROCHLORIDE AND DEXTROMETHORPHAN HYDROBROMIDE 6.25; 15 MG/5ML; MG/5ML
5 SYRUP ORAL EVERY 4 HOURS PRN
Qty: 120 ML | Refills: 1 | Status: SHIPPED | OUTPATIENT
Start: 2023-12-28 | End: 2024-01-31 | Stop reason: WASHOUT

## 2023-12-28 NOTE — TELEPHONE ENCOUNTER
Patient tested positive for COVID yesterday. His symptoms started on Monday evening. Deep Cough, sore throat lots of phlegm headache. No fever     Requesting Paxlovid and something for the cough.    Please advise    [facility] [place]

## 2024-01-03 ENCOUNTER — APPOINTMENT (OUTPATIENT)
Dept: ORTHOPEDIC SURGERY | Facility: CLINIC | Age: 79
End: 2024-01-03
Payer: MEDICARE

## 2024-01-05 DIAGNOSIS — M79.641 BILATERAL HAND PAIN: ICD-10-CM

## 2024-01-05 DIAGNOSIS — M79.642 BILATERAL HAND PAIN: ICD-10-CM

## 2024-01-10 ENCOUNTER — OFFICE VISIT (OUTPATIENT)
Dept: ORTHOPEDIC SURGERY | Facility: CLINIC | Age: 79
End: 2024-01-10
Payer: MEDICARE

## 2024-01-10 ENCOUNTER — ANCILLARY PROCEDURE (OUTPATIENT)
Dept: RADIOLOGY | Facility: CLINIC | Age: 79
End: 2024-01-10
Payer: MEDICARE

## 2024-01-10 VITALS — WEIGHT: 215 LBS | BODY MASS INDEX: 33.74 KG/M2 | HEIGHT: 67 IN

## 2024-01-10 DIAGNOSIS — G56.02 CARPAL TUNNEL SYNDROME OF LEFT WRIST: Primary | ICD-10-CM

## 2024-01-10 DIAGNOSIS — G56.01 CARPAL TUNNEL SYNDROME OF RIGHT WRIST: ICD-10-CM

## 2024-01-10 DIAGNOSIS — G56.03 BILATERAL CARPAL TUNNEL SYNDROME: ICD-10-CM

## 2024-01-10 DIAGNOSIS — M79.641 BILATERAL HAND PAIN: ICD-10-CM

## 2024-01-10 DIAGNOSIS — M79.642 BILATERAL HAND PAIN: ICD-10-CM

## 2024-01-10 PROCEDURE — 99204 OFFICE O/P NEW MOD 45 MIN: CPT | Performed by: ORTHOPAEDIC SURGERY

## 2024-01-10 PROCEDURE — 1036F TOBACCO NON-USER: CPT | Performed by: ORTHOPAEDIC SURGERY

## 2024-01-10 PROCEDURE — 73130 X-RAY EXAM OF HAND: CPT | Mod: 50

## 2024-01-10 PROCEDURE — 1125F AMNT PAIN NOTED PAIN PRSNT: CPT | Performed by: ORTHOPAEDIC SURGERY

## 2024-01-10 PROCEDURE — 73130 X-RAY EXAM OF HAND: CPT | Mod: BILATERAL PROCEDURE | Performed by: RADIOLOGY

## 2024-01-10 PROCEDURE — 1159F MED LIST DOCD IN RCRD: CPT | Performed by: ORTHOPAEDIC SURGERY

## 2024-01-10 ASSESSMENT — PAIN - FUNCTIONAL ASSESSMENT: PAIN_FUNCTIONAL_ASSESSMENT: 0-10

## 2024-01-10 ASSESSMENT — PAIN SCALES - GENERAL: PAINLEVEL_OUTOF10: 5 - MODERATE PAIN

## 2024-01-10 NOTE — PROGRESS NOTES
Neftali is a 78 y.o. male referred by his cardiologist for bilateral hand pain for about a year.  He states he has a lot of numbness and tingling that goes into his wrist also.  No testing.  No prior surgery.

## 2024-01-10 NOTE — PROGRESS NOTES
78 y.o. male presents today for evaluation of bilateral hand numbness, tingling, weakness and pain. The patient reports symptoms for months, getting worse.  Pain is controlled.  Reports no previous surgeries, injections or trauma to the area.  Reports pain worse with use, better at rest.  Pain numb and tingly, does not wake them from sleep.   Worse driving a car and talking on a phone.  Worse during the day.  Left worse than right.    Review of Systems    Constitutional: see HPI, no fever, no chills, not feeling tired, no significant weight gain or weight loss.   Eyes: No vision changes  ENT: no nosebleeds.   Cardiovascular: no chest pain.   Respiratory: no shortness of breath and no cough.   Gastrointestinal: no abdominal pain, no nausea, no vomiting and no diarrhea.   Musculoskeletal: per HPI  Neurological: no headache, no gait disturbances  Psychiatric: no depression and no sleep disturbances.   Endocrine: no muscle weakness and no muscle cramps.   Hematologic/Lymphatic: no swollen glands and no tendency for easy bruising or excessive swelling.     Patient's past medical history, past surgical history, allergies, and medications have been reviewed unless otherwise noted in the chart.     Carpal Tunnel Exam  Inspection:  no evidence of infection, no edema, no erythema, no ecchymosis, Palpation:  compartments are soft, no pain with palpation, Range of Motion:  full wrist and finger range of motion, Stability:  no wrist instability detected, Strength:  5/5 APB and intrinsics, Skin:  intact, Vascular:  capillary refill <2 seconds distally, Sensation:  decreased in the median nerve distribution, Test:  positive Tinel's at the Carpal Tunnel, positive Direct Carpal Tunnel Compression Test      Constitutional   General appearance: Alert and in no acute distress. Well developed, well nourished.    Eyes   External Eye, Conjunctiva and lids: Normal external exam - pupils were equal in size, round, reactive to light (PERRL)  with normal accommodation and extraocular movements intact (EOMI).   Ears, Nose, Mouth, and Throat   Hearing: Normal.   Neck   Neck: No neck mass was observed. Supple.   Pulmonary   Respiratory effort: No respiratory distress.   Cardiovascular   Examination of extremities: No peripheral edema.   Neurologic   Sensation: Normal.   Psychiatric   Judgment and insight: Intact.   Orientation to person, place, and time: Alert and oriented x 3.       Mood and affect: Normal.      Bilateral CTS  Based on the history, physical exam and imaging studies above, the patient's presentation is consistent with the above diagnosis.  I had a long discussion with the patient regarding their presentation and the treatment options.  We discussed initial nonoperative versus operative management options as well as potential further diagnostic imaging.  We again discussed her treatment options going forward along with their associated risks and benefits. After thorough discussion, the patient has elected to proceed with conservative management. All questions were answered to the patients satisfaction who seems satisfied with the plan.  They will call the office with any questions/concerns.    Night splint  EMG  FU after EMG - no XR

## 2024-01-24 ENCOUNTER — ANCILLARY PROCEDURE (OUTPATIENT)
Dept: NEUROLOGY | Facility: CLINIC | Age: 79
End: 2024-01-24
Payer: MEDICARE

## 2024-01-24 VITALS
WEIGHT: 220 LBS | TEMPERATURE: 97.7 F | HEART RATE: 65 BPM | SYSTOLIC BLOOD PRESSURE: 148 MMHG | DIASTOLIC BLOOD PRESSURE: 59 MMHG | HEIGHT: 66 IN | BODY MASS INDEX: 35.36 KG/M2

## 2024-01-24 DIAGNOSIS — G56.03 BILATERAL CARPAL TUNNEL SYNDROME: ICD-10-CM

## 2024-01-24 PROCEDURE — 95911 NRV CNDJ TEST 9-10 STUDIES: CPT | Performed by: PSYCHIATRY & NEUROLOGY

## 2024-01-24 PROCEDURE — 95886 MUSC TEST DONE W/N TEST COMP: CPT | Performed by: PSYCHIATRY & NEUROLOGY

## 2024-01-24 NOTE — PROGRESS NOTES
PRELIMINARY EMG REPORT    This study is abnormal.  There is electrophysiological evidence for a median mononeuropathy at both wrists (e.g. carpal tunnel syndrome), moderate-severe on the left and moderate on the right, without active denervation of the thenar muscles.  There is no electrophysiological evidence for another large-fiber peripheral neuropathy, cervical radiculopathy, or brachial plexopathy in either arm.    John Long Jr., M.D., FAAN, FAANEM

## 2024-01-24 NOTE — LETTER
January 24, 2024     Eze Zamorano DO  [address]  [facility] [address]    Patient: Neftali Schmitt   YOB: 1945   Date of Visit: 1/24/2024       Dear Dr. Eze Zamorano DO:    Thank you for referring Neftali Schmitt to me for EMG/NCS. Below are my notes for this visit.  If you have questions, please do not hesitate to call me. I look forward to following your patient along with you.       Sincerely,     John Long Jr., M.D., RUCHI PASCUAL       CC: Kiran Beard MD  ______________________________________________________________________________________    PRELIMINARY EMG REPORT    This study is abnormal.  There is electrophysiological evidence for a median mononeuropathy at both wrists (e.g. carpal tunnel syndrome), moderate-severe on the left and moderate on the right, without active denervation of the thenar muscles.  There is no electrophysiological evidence for another large-fiber peripheral neuropathy, cervical radiculopathy, or brachial plexopathy in either arm.    John Long Jr., M.D., RUCHI PASCUAL

## 2024-01-31 ENCOUNTER — OFFICE VISIT (OUTPATIENT)
Dept: ORTHOPEDIC SURGERY | Facility: CLINIC | Age: 79
End: 2024-01-31
Payer: MEDICARE

## 2024-01-31 ENCOUNTER — OFFICE VISIT (OUTPATIENT)
Dept: PRIMARY CARE | Facility: CLINIC | Age: 79
End: 2024-01-31
Payer: MEDICARE

## 2024-01-31 ENCOUNTER — PREP FOR PROCEDURE (OUTPATIENT)
Dept: ORTHOPEDIC SURGERY | Facility: CLINIC | Age: 79
End: 2024-01-31

## 2024-01-31 VITALS — HEIGHT: 66 IN | BODY MASS INDEX: 35.36 KG/M2 | WEIGHT: 220 LBS

## 2024-01-31 VITALS
TEMPERATURE: 97.6 F | OXYGEN SATURATION: 94 % | RESPIRATION RATE: 14 BRPM | SYSTOLIC BLOOD PRESSURE: 134 MMHG | DIASTOLIC BLOOD PRESSURE: 72 MMHG | HEART RATE: 60 BPM | WEIGHT: 225 LBS | BODY MASS INDEX: 36.32 KG/M2

## 2024-01-31 DIAGNOSIS — I10 BENIGN ESSENTIAL HYPERTENSION: Primary | ICD-10-CM

## 2024-01-31 DIAGNOSIS — H81.10 BENIGN PAROXYSMAL POSITIONAL VERTIGO, UNSPECIFIED LATERALITY: ICD-10-CM

## 2024-01-31 DIAGNOSIS — G56.02 CARPAL TUNNEL SYNDROME OF LEFT WRIST: Primary | ICD-10-CM

## 2024-01-31 DIAGNOSIS — G56.01 CARPAL TUNNEL SYNDROME OF RIGHT WRIST: ICD-10-CM

## 2024-01-31 DIAGNOSIS — I65.23 BILATERAL CAROTID ARTERY STENOSIS: ICD-10-CM

## 2024-01-31 PROCEDURE — 1158F ADVNC CARE PLAN TLK DOCD: CPT | Performed by: FAMILY MEDICINE

## 2024-01-31 PROCEDURE — 1123F ACP DISCUSS/DSCN MKR DOCD: CPT | Performed by: FAMILY MEDICINE

## 2024-01-31 PROCEDURE — 1159F MED LIST DOCD IN RCRD: CPT | Performed by: FAMILY MEDICINE

## 2024-01-31 PROCEDURE — 99214 OFFICE O/P EST MOD 30 MIN: CPT | Performed by: ORTHOPAEDIC SURGERY

## 2024-01-31 PROCEDURE — 3075F SYST BP GE 130 - 139MM HG: CPT | Performed by: FAMILY MEDICINE

## 2024-01-31 PROCEDURE — 1159F MED LIST DOCD IN RCRD: CPT | Performed by: ORTHOPAEDIC SURGERY

## 2024-01-31 PROCEDURE — 1125F AMNT PAIN NOTED PAIN PRSNT: CPT | Performed by: FAMILY MEDICINE

## 2024-01-31 PROCEDURE — 1160F RVW MEDS BY RX/DR IN RCRD: CPT | Performed by: FAMILY MEDICINE

## 2024-01-31 PROCEDURE — 1036F TOBACCO NON-USER: CPT | Performed by: ORTHOPAEDIC SURGERY

## 2024-01-31 PROCEDURE — 1036F TOBACCO NON-USER: CPT | Performed by: FAMILY MEDICINE

## 2024-01-31 PROCEDURE — 3078F DIAST BP <80 MM HG: CPT | Performed by: FAMILY MEDICINE

## 2024-01-31 PROCEDURE — 1125F AMNT PAIN NOTED PAIN PRSNT: CPT | Performed by: ORTHOPAEDIC SURGERY

## 2024-01-31 PROCEDURE — 99213 OFFICE O/P EST LOW 20 MIN: CPT | Performed by: FAMILY MEDICINE

## 2024-01-31 ASSESSMENT — ENCOUNTER SYMPTOMS
RESPIRATORY NEGATIVE: 1
CARDIOVASCULAR NEGATIVE: 1
CONSTITUTIONAL NEGATIVE: 1
DIZZINESS: 1

## 2024-01-31 ASSESSMENT — PAIN - FUNCTIONAL ASSESSMENT: PAIN_FUNCTIONAL_ASSESSMENT: NO/DENIES PAIN

## 2024-01-31 NOTE — PROGRESS NOTES
78 y.o. male presents today for follow up of bilateral hand numbness, tingling, weakness and pain. The patient reports symptoms for months, getting worse.  Pain is controlled.  Reports no previous surgeries, injections or trauma to the area.  Reports pain worse with use, better at rest.  Pain numb and tingly, does not wake them from sleep.   Worse driving a car and talking on a phone.  Worse during the day.  Left worse than right. Got EMG.      Review of Systems    Constitutional: see HPI, no fever, no chills, not feeling tired, no significant weight gain or weight loss.   Eyes: No vision changes  ENT: no nosebleeds.   Cardiovascular: no chest pain.   Respiratory: no shortness of breath and no cough.   Gastrointestinal: no abdominal pain, no nausea, no vomiting and no diarrhea.   Musculoskeletal: per HPI  Neurological: no headache, no gait disturbances  Psychiatric: no depression and no sleep disturbances.   Endocrine: no muscle weakness and no muscle cramps.   Hematologic/Lymphatic: no swollen glands and no tendency for easy bruising or excessive swelling.     Patient's past medical history, past surgical history, allergies, and medications have been reviewed unless otherwise noted in the chart.     Carpal Tunnel Exam  Inspection:  no evidence of infection, no edema, no erythema, no ecchymosis, Palpation:  compartments are soft, no pain with palpation, Range of Motion:  full wrist and finger range of motion, Stability:  no wrist instability detected, Strength:  5/5 APB and intrinsics, Skin:  intact, Vascular:  capillary refill <2 seconds distally, Sensation:  decreased in the median nerve distribution, Test:  positive Tinel's at the Carpal Tunnel, positive Direct Carpal Tunnel Compression Test      Constitutional   General appearance: Alert and in no acute distress. Well developed, well nourished.    Eyes   External Eye, Conjunctiva and lids: Normal external exam - pupils were equal in size, round, reactive to  light (PERRL) with normal accommodation and extraocular movements intact (EOMI).   Ears, Nose, Mouth, and Throat   Hearing: Normal.   Neck   Neck: No neck mass was observed. Supple.   Pulmonary   Respiratory effort: No respiratory distress.   Auscultation of lungs: Clear bilateral breath sounds.   Cardiovascular   Examination of extremities: No peripheral edema.   Auscultation of heart: Heart rate and rhythm were normal   Psychiatric   Judgment and insight: Intact.   Orientation to person, place, and time: Alert and oriented x 3.       Mood and affect: Normal.      EMG -mod severe left, mod right CTS    Bilateral CTS  Surgery discussion: I discussed the diagnosis and treatment options with the patient today along with their associated risks and benefits. After thorough discussion, the patient has elected to proceed with surgical intervention. The patient understands the risks of,including, but not limited to, bleeding, infection, loss of life or limb, pain, permanent numbness, tingling, weakness, loss of motion, failure of the goals of surgery or the potential need for additional surgery. The patient would like to accept these risks and proceed. All questions were answered to the patients satisfaction and the patient seems satisfied with the plan.    Plan left ECTR  FU 2 weeks after - No XR

## 2024-01-31 NOTE — ASSESSMENT & PLAN NOTE
As of today says symptoms have resolved.  He will continue to monitor he has Antivert at home if needed.  We discussed head positioning.

## 2024-01-31 NOTE — PROGRESS NOTES
Follow up to review EMG testing done 01/24/2024.  He has bilateral CTS but he states his left is worse.

## 2024-01-31 NOTE — PROGRESS NOTES
Subjective   Patient ID: Neftali Schmitt is a 78 y.o. male who presents for Follow-up (Dizziness, shakiness yesterday. 3 weeks post COVID).    HPI patient in today stated that he was having some issues with dizziness vertigo which she has had before.  He is also about 3 weeks post COVID.  This is for the most part if he is doing well today his symptoms have resolved.  But he thought he should come in just to get checked out.  He has seen orthopedics and is can have carpal tunnel surgery in a few weeks.  He denies chest pain shortness of breath palpitations syncope or near syncope no nausea no vomiting.  No unusual headaches.    Review of Systems   Constitutional: Negative.    HENT: Negative.     Respiratory: Negative.     Cardiovascular: Negative.    Neurological:  Positive for dizziness.       Objective   /72   Pulse 60   Temp 36.4 °C (97.6 °F)   Resp 14   Wt 102 kg (225 lb)   SpO2 94%   BMI 36.32 kg/m²     Physical Exam  Constitutional:       General: He is not in acute distress.     Appearance: Normal appearance. He is not ill-appearing.   HENT:      Head: Normocephalic.      Right Ear: Tympanic membrane, ear canal and external ear normal.      Left Ear: Tympanic membrane, ear canal and external ear normal.      Nose: Nose normal.   Cardiovascular:      Rate and Rhythm: Normal rate and regular rhythm.      Heart sounds: Normal heart sounds.   Pulmonary:      Effort: Pulmonary effort is normal. No respiratory distress.      Breath sounds: Normal breath sounds.   Musculoskeletal:         General: Normal range of motion.   Neurological:      General: No focal deficit present.      Mental Status: He is oriented to person, place, and time. Mental status is at baseline.      Coordination: Coordination normal.      Gait: Gait normal.     Clinically patient appears to be back to his baseline he will monitor for now use the Antivert if needed we discussed proper head positioning.  Call or go to ER if  anything worsens.  Keep appointment with his vascular surgeon in 4 weeks to follow-up on his carotid artery disease.    Return to our office in approximately 6 weeks for regular appointment along with fasting lab work    Assessment/Plan   Problem List Items Addressed This Visit             ICD-10-CM    Benign essential hypertension - Primary I10     Stable continue current treatment         Benign paroxysmal positional vertigo H81.10     As of today says symptoms have resolved.  He will continue to monitor he has Antivert at home if needed.  We discussed head positioning.         Bilateral carotid artery disease (CMS/[facility]) I77.9     Reviewed previous carotid ultrasound and CT angio of the neck from 2023 with patient.  He has a follow-up appointment with vascular surgery in about 4 weeks.

## 2024-01-31 NOTE — ASSESSMENT & PLAN NOTE
Reviewed previous carotid ultrasound and CT angio of the neck from 2023 with patient.  He has a follow-up appointment with vascular surgery in about 4 weeks.

## 2024-02-16 ENCOUNTER — ANESTHESIA EVENT (OUTPATIENT)
Dept: OPERATING ROOM | Facility: HOSPITAL | Age: 79
End: 2024-02-16
Payer: MEDICARE

## 2024-02-20 ENCOUNTER — PHARMACY VISIT (OUTPATIENT)
Dept: PHARMACY | Facility: CLINIC | Age: 79
End: 2024-02-20
Payer: COMMERCIAL

## 2024-02-20 ENCOUNTER — HOSPITAL ENCOUNTER (OUTPATIENT)
Facility: HOSPITAL | Age: 79
Setting detail: OUTPATIENT SURGERY
Discharge: HOME | End: 2024-02-20
Attending: ORTHOPAEDIC SURGERY | Admitting: ORTHOPAEDIC SURGERY
Payer: MEDICARE

## 2024-02-20 ENCOUNTER — ANESTHESIA (OUTPATIENT)
Dept: OPERATING ROOM | Facility: HOSPITAL | Age: 79
End: 2024-02-20
Payer: MEDICARE

## 2024-02-20 ENCOUNTER — APPOINTMENT (OUTPATIENT)
Dept: CARDIOLOGY | Facility: HOSPITAL | Age: 79
End: 2024-02-20
Payer: MEDICARE

## 2024-02-20 VITALS
BODY MASS INDEX: 36.14 KG/M2 | WEIGHT: 224.87 LBS | OXYGEN SATURATION: 94 % | SYSTOLIC BLOOD PRESSURE: 137 MMHG | RESPIRATION RATE: 16 BRPM | DIASTOLIC BLOOD PRESSURE: 75 MMHG | HEIGHT: 66 IN | TEMPERATURE: 97.5 F | HEART RATE: 58 BPM

## 2024-02-20 DIAGNOSIS — G56.02 CARPAL TUNNEL SYNDROME OF LEFT WRIST: Primary | ICD-10-CM

## 2024-02-20 LAB
ANION GAP SERPL CALC-SCNC: 14 MMOL/L (ref 10–20)
ATRIAL RATE: 58 BPM
BUN SERPL-MCNC: 15 MG/DL (ref 6–23)
CALCIUM SERPL-MCNC: 9.2 MG/DL (ref 8.6–10.3)
CHLORIDE SERPL-SCNC: 103 MMOL/L (ref 98–107)
CO2 SERPL-SCNC: 26 MMOL/L (ref 21–32)
CREAT SERPL-MCNC: 0.86 MG/DL (ref 0.5–1.3)
EGFRCR SERPLBLD CKD-EPI 2021: 88 ML/MIN/1.73M*2
ERYTHROCYTE [DISTWIDTH] IN BLOOD BY AUTOMATED COUNT: 13.5 % (ref 11.5–14.5)
GLUCOSE BLD MANUAL STRIP-MCNC: 110 MG/DL (ref 74–99)
GLUCOSE SERPL-MCNC: 116 MG/DL (ref 74–99)
HCT VFR BLD AUTO: 42.7 % (ref 41–52)
HGB BLD-MCNC: 14.7 G/DL (ref 13.5–17.5)
MCH RBC QN AUTO: 31.4 PG (ref 26–34)
MCHC RBC AUTO-ENTMCNC: 34.4 G/DL (ref 32–36)
MCV RBC AUTO: 91 FL (ref 80–100)
NRBC BLD-RTO: 0 /100 WBCS (ref 0–0)
P AXIS: -4 DEGREES
PLATELET # BLD AUTO: 215 X10*3/UL (ref 150–450)
POTASSIUM SERPL-SCNC: 3.6 MMOL/L (ref 3.5–5.3)
PR INTERVAL: 197 MS
Q ONSET: 249 MS
QRS COUNT: 9 BEATS
QRS DURATION: 127 MS
QT INTERVAL: 463 MS
QTC CALCULATION(BAZETT): 459 MS
QTC FREDERICIA: 460 MS
R AXIS: -8 DEGREES
RBC # BLD AUTO: 4.68 X10*6/UL (ref 4.5–5.9)
SODIUM SERPL-SCNC: 139 MMOL/L (ref 136–145)
T AXIS: 98 DEGREES
T OFFSET: 481 MS
VENTRICULAR RATE: 59 BPM
WBC # BLD AUTO: 7.2 X10*3/UL (ref 4.4–11.3)

## 2024-02-20 PROCEDURE — 2500000005 HC RX 250 GENERAL PHARMACY W/O HCPCS: Performed by: ORTHOPAEDIC SURGERY

## 2024-02-20 PROCEDURE — 2500000004 HC RX 250 GENERAL PHARMACY W/ HCPCS (ALT 636 FOR OP/ED): Performed by: ORTHOPAEDIC SURGERY

## 2024-02-20 PROCEDURE — 2720000007 HC OR 272 NO HCPCS: Performed by: ORTHOPAEDIC SURGERY

## 2024-02-20 PROCEDURE — 93005 ELECTROCARDIOGRAM TRACING: CPT | Mod: 59

## 2024-02-20 PROCEDURE — 3700000001 HC GENERAL ANESTHESIA TIME - INITIAL BASE CHARGE: Performed by: ORTHOPAEDIC SURGERY

## 2024-02-20 PROCEDURE — 2500000001 HC RX 250 WO HCPCS SELF ADMINISTERED DRUGS (ALT 637 FOR MEDICARE OP): Performed by: ANESTHESIOLOGY

## 2024-02-20 PROCEDURE — 85027 COMPLETE CBC AUTOMATED: CPT | Performed by: ANESTHESIOLOGY

## 2024-02-20 PROCEDURE — 93010 ELECTROCARDIOGRAM REPORT: CPT | Performed by: INTERNAL MEDICINE

## 2024-02-20 PROCEDURE — 29848 WRIST ENDOSCOPY/SURGERY: CPT | Performed by: ORTHOPAEDIC SURGERY

## 2024-02-20 PROCEDURE — 2500000004 HC RX 250 GENERAL PHARMACY W/ HCPCS (ALT 636 FOR OP/ED): Performed by: NURSE ANESTHETIST, CERTIFIED REGISTERED

## 2024-02-20 PROCEDURE — 2500000005 HC RX 250 GENERAL PHARMACY W/O HCPCS: Performed by: ANESTHESIOLOGY

## 2024-02-20 PROCEDURE — 3600000008 HC OR TIME - EACH INCREMENTAL 1 MINUTE - PROCEDURE LEVEL THREE: Performed by: ORTHOPAEDIC SURGERY

## 2024-02-20 PROCEDURE — 2500000004 HC RX 250 GENERAL PHARMACY W/ HCPCS (ALT 636 FOR OP/ED): Performed by: ANESTHESIOLOGY

## 2024-02-20 PROCEDURE — RXMED WILLOW AMBULATORY MEDICATION CHARGE

## 2024-02-20 PROCEDURE — 82947 ASSAY GLUCOSE BLOOD QUANT: CPT

## 2024-02-20 PROCEDURE — 7100000002 HC RECOVERY ROOM TIME - EACH INCREMENTAL 1 MINUTE: Performed by: ORTHOPAEDIC SURGERY

## 2024-02-20 PROCEDURE — 7100000009 HC PHASE TWO TIME - INITIAL BASE CHARGE: Performed by: ORTHOPAEDIC SURGERY

## 2024-02-20 PROCEDURE — 2500000005 HC RX 250 GENERAL PHARMACY W/O HCPCS: Performed by: NURSE ANESTHETIST, CERTIFIED REGISTERED

## 2024-02-20 PROCEDURE — 3600000003 HC OR TIME - INITIAL BASE CHARGE - PROCEDURE LEVEL THREE: Performed by: ORTHOPAEDIC SURGERY

## 2024-02-20 PROCEDURE — 3700000002 HC GENERAL ANESTHESIA TIME - EACH INCREMENTAL 1 MINUTE: Performed by: ORTHOPAEDIC SURGERY

## 2024-02-20 PROCEDURE — 80048 BASIC METABOLIC PNL TOTAL CA: CPT | Performed by: ANESTHESIOLOGY

## 2024-02-20 PROCEDURE — 7100000010 HC PHASE TWO TIME - EACH INCREMENTAL 1 MINUTE: Performed by: ORTHOPAEDIC SURGERY

## 2024-02-20 PROCEDURE — 36415 COLL VENOUS BLD VENIPUNCTURE: CPT | Performed by: ANESTHESIOLOGY

## 2024-02-20 PROCEDURE — 7100000001 HC RECOVERY ROOM TIME - INITIAL BASE CHARGE: Performed by: ORTHOPAEDIC SURGERY

## 2024-02-20 RX ORDER — ONDANSETRON HYDROCHLORIDE 2 MG/ML
INJECTION, SOLUTION INTRAVENOUS AS NEEDED
Status: DISCONTINUED | OUTPATIENT
Start: 2024-02-20 | End: 2024-02-20

## 2024-02-20 RX ORDER — ONDANSETRON HYDROCHLORIDE 2 MG/ML
4 INJECTION, SOLUTION INTRAVENOUS ONCE
Status: COMPLETED | OUTPATIENT
Start: 2024-02-20 | End: 2024-02-20

## 2024-02-20 RX ORDER — FAMOTIDINE 10 MG/ML
20 INJECTION INTRAVENOUS ONCE
Status: COMPLETED | OUTPATIENT
Start: 2024-02-20 | End: 2024-02-20

## 2024-02-20 RX ORDER — CEFAZOLIN SODIUM 2 G/100ML
2 INJECTION, SOLUTION INTRAVENOUS EVERY 8 HOURS
Status: DISCONTINUED | OUTPATIENT
Start: 2024-02-20 | End: 2024-02-20 | Stop reason: HOSPADM

## 2024-02-20 RX ORDER — LIDOCAINE HYDROCHLORIDE AND EPINEPHRINE 20; 10 MG/ML; UG/ML
INJECTION, SOLUTION INFILTRATION; PERINEURAL AS NEEDED
Status: DISCONTINUED | OUTPATIENT
Start: 2024-02-20 | End: 2024-02-20 | Stop reason: HOSPADM

## 2024-02-20 RX ORDER — BUPIVACAINE HCL/EPINEPHRINE 0.5-1:200K
VIAL (ML) INJECTION AS NEEDED
Status: DISCONTINUED | OUTPATIENT
Start: 2024-02-20 | End: 2024-02-20 | Stop reason: HOSPADM

## 2024-02-20 RX ORDER — LIDOCAINE HCL/PF 100 MG/5ML
SYRINGE (ML) INTRAVENOUS AS NEEDED
Status: DISCONTINUED | OUTPATIENT
Start: 2024-02-20 | End: 2024-02-20

## 2024-02-20 RX ORDER — PROPOFOL 10 MG/ML
INJECTION, EMULSION INTRAVENOUS CONTINUOUS PRN
Status: DISCONTINUED | OUTPATIENT
Start: 2024-02-20 | End: 2024-02-20

## 2024-02-20 RX ORDER — METOCLOPRAMIDE HYDROCHLORIDE 5 MG/ML
10 INJECTION INTRAMUSCULAR; INTRAVENOUS ONCE
Status: COMPLETED | OUTPATIENT
Start: 2024-02-20 | End: 2024-02-20

## 2024-02-20 RX ORDER — PROPOFOL 10 MG/ML
INJECTION, EMULSION INTRAVENOUS AS NEEDED
Status: DISCONTINUED | OUTPATIENT
Start: 2024-02-20 | End: 2024-02-20

## 2024-02-20 RX ORDER — SODIUM CITRATE AND CITRIC ACID MONOHYDRATE 334; 500 MG/5ML; MG/5ML
30 SOLUTION ORAL ONCE
Status: COMPLETED | OUTPATIENT
Start: 2024-02-20 | End: 2024-02-20

## 2024-02-20 RX ORDER — ONDANSETRON HYDROCHLORIDE 2 MG/ML
4 INJECTION, SOLUTION INTRAVENOUS ONCE AS NEEDED
Status: DISCONTINUED | OUTPATIENT
Start: 2024-02-20 | End: 2024-02-20 | Stop reason: HOSPADM

## 2024-02-20 RX ORDER — MORPHINE SULFATE 2 MG/ML
2 INJECTION, SOLUTION INTRAMUSCULAR; INTRAVENOUS EVERY 5 MIN PRN
Status: DISCONTINUED | OUTPATIENT
Start: 2024-02-20 | End: 2024-02-20 | Stop reason: HOSPADM

## 2024-02-20 RX ORDER — FENTANYL CITRATE 50 UG/ML
INJECTION, SOLUTION INTRAMUSCULAR; INTRAVENOUS AS NEEDED
Status: DISCONTINUED | OUTPATIENT
Start: 2024-02-20 | End: 2024-02-20

## 2024-02-20 RX ORDER — HYDROCODONE BITARTRATE AND ACETAMINOPHEN 5; 325 MG/1; MG/1
1 TABLET ORAL EVERY 6 HOURS PRN
Qty: 10 TABLET | Refills: 0 | Status: SHIPPED | OUTPATIENT
Start: 2024-02-20 | End: 2024-05-07 | Stop reason: WASHOUT

## 2024-02-20 RX ORDER — KETOROLAC TROMETHAMINE 30 MG/ML
INJECTION, SOLUTION INTRAMUSCULAR; INTRAVENOUS AS NEEDED
Status: DISCONTINUED | OUTPATIENT
Start: 2024-02-20 | End: 2024-02-20

## 2024-02-20 RX ORDER — SODIUM CHLORIDE 9 MG/ML
50 INJECTION, SOLUTION INTRAVENOUS CONTINUOUS
Status: DISCONTINUED | OUTPATIENT
Start: 2024-02-20 | End: 2024-02-20 | Stop reason: HOSPADM

## 2024-02-20 RX ADMIN — CEFAZOLIN SODIUM 2 G: 2 INJECTION, SOLUTION INTRAVENOUS at 08:34

## 2024-02-20 RX ADMIN — FAMOTIDINE 20 MG: 10 INJECTION, SOLUTION INTRAVENOUS at 07:41

## 2024-02-20 RX ADMIN — SODIUM CHLORIDE 50 ML/HR: 9 INJECTION, SOLUTION INTRAVENOUS at 07:41

## 2024-02-20 RX ADMIN — PROPOFOL 50 MG: 10 INJECTION, EMULSION INTRAVENOUS at 08:43

## 2024-02-20 RX ADMIN — Medication: at 07:00

## 2024-02-20 RX ADMIN — KETOROLAC TROMETHAMINE 30 MG: 30 INJECTION, SOLUTION INTRAMUSCULAR at 08:46

## 2024-02-20 RX ADMIN — METOCLOPRAMIDE 10 MG: 5 INJECTION, SOLUTION INTRAMUSCULAR; INTRAVENOUS at 07:41

## 2024-02-20 RX ADMIN — PROPOFOL 75 MCG/KG/MIN: 10 INJECTION, EMULSION INTRAVENOUS at 08:36

## 2024-02-20 RX ADMIN — LIDOCAINE HYDROCHLORIDE 100 MG: 20 INJECTION INTRAVENOUS at 08:43

## 2024-02-20 RX ADMIN — ONDANSETRON 4 MG: 2 INJECTION INTRAMUSCULAR; INTRAVENOUS at 08:46

## 2024-02-20 RX ADMIN — ONDANSETRON 4 MG: 2 INJECTION INTRAMUSCULAR; INTRAVENOUS at 07:41

## 2024-02-20 RX ADMIN — SODIUM CITRATE AND CITRIC ACID MONOHYDRATE 30 ML: 500; 334 SOLUTION ORAL at 07:41

## 2024-02-20 RX ADMIN — FENTANYL CITRATE 50 MCG: 50 INJECTION INTRAMUSCULAR; INTRAVENOUS at 08:36

## 2024-02-20 SDOH — HEALTH STABILITY: MENTAL HEALTH: CURRENT SMOKER: 0

## 2024-02-20 ASSESSMENT — PAIN - FUNCTIONAL ASSESSMENT
PAIN_FUNCTIONAL_ASSESSMENT: 0-10
PAIN_FUNCTIONAL_ASSESSMENT: 0-10

## 2024-02-20 ASSESSMENT — COLUMBIA-SUICIDE SEVERITY RATING SCALE - C-SSRS
1. IN THE PAST MONTH, HAVE YOU WISHED YOU WERE DEAD OR WISHED YOU COULD GO TO SLEEP AND NOT WAKE UP?: NO
2. HAVE YOU ACTUALLY HAD ANY THOUGHTS OF KILLING YOURSELF?: NO
6. HAVE YOU EVER DONE ANYTHING, STARTED TO DO ANYTHING, OR PREPARED TO DO ANYTHING TO END YOUR LIFE?: NO

## 2024-02-20 ASSESSMENT — PAIN SCALES - GENERAL
PAINLEVEL_OUTOF10: 0 - NO PAIN
PAIN_LEVEL: 0
PAINLEVEL_OUTOF10: 0 - NO PAIN

## 2024-02-20 NOTE — OP NOTE
ORTHOPEDIC OPERATIVE NOTE    Name: Neftali Schmitt  : 1945  Surgeon: Kelton Zamorano DO  Facility: [facility]  Date of Surgery: 24  ORTHOPEDIC OPERATIVE NOTE    SURGEON:     Kelton Zamorano DO  PRE OP DIAGNOSIS:  Carpal Tunnel Syndrome left Wrist  POST OP DIAGNOSIS:  Same  PROCEDURE:   Endoscopic Carpal Tunnel Release left Wrist    ANESTHESIA:  Local with sedation  ASSISTANT:    SA Duong  COMPLICATIONS:   None.  CONDITION:    Satisfactory to PACU.  BLOOD LOSS: Minimal    INDICATION FOR PROCEDURE: The patient is a 78 y.o. -year-old male who has failed nonsurgical management for left carpal tunnel syndrome including night splints. They had an extensive workup consisting of signs, symptoms, and clinical history of EMG nerve conduction study consistent with left carpal syndrome.  The patient was seen in the office, fully informed risks and benefits of the procedure, and elected to undergo the procedure.    PROCEDURE IN DETAIL: The patient was seen and consented preoperatively with side and site of surgery appropriately marked. The patient was taken back to the operative suite, placed supine on the operative table, and placed on monitor for the duration of case. The patient was administered sedation and monitored throughout the entire surgery by Department of Anesthesia. While sedated, the patient was administered 5 cc of mixed marcaine and lidocaine to the volar aspect of wrist and palm for local anesthesia. The operative upper extremity was then sterilely prepped and draped in sterile orthopedic fashion, elevated with an Esmarch, and tourniquet inflated to 250 mmHg for the duration of case, and time-out was performed confirming site of surgery and surgery to be performed.    A 1-cm transverse incision was made to the ulnar aspect of the palmaris longus at proximal wrist crease. Skin and underlying tissues were sharply dissected down. Hemostasis maintained with bipolar electrocauterization.  Distally based flap of the fascia overlying the median nerve was then released, and under direct visualization, proximal fascia was released with Littler scissors. The elevator and dilator were then placed in the carpal tunnel with appropriate ease of passage and corrugated feel of the undersurface of transcarpal ligament. The endoscope was then placed under direct visualization. The transverse carpal ligament was released in its entirety, confirmed both visually as well as by utilizing endoscope as a probe, which freely passed following release. The nerve was evaluated. No evidence of lesion or adhesion was present.    The wound was irrigated with sterile saline, closed with 5-0 nylon suture. Sterile dressing was then placed of Xeroform and 4x4s affixed with Kerlix and Ace wrap. Tourniquet was deflated, and the patient was taken to PACU in satisfactory condition.    Electronically signed  Kelton Zamorano DO

## 2024-02-20 NOTE — DISCHARGE INSTRUCTIONS
[facility]  [phone]   Fax: [phone] [address]   [address] [facility] -  [address] [place] [id number]    Upper Extremity - Endoscopic Carpal Tunnel Release    1. Dressing    You have a soft bandage over the operative site.  DO NOT GET DRESSING WET! Once at home, if your dressing feels too tight or mistakenly gets wet, please contact the office. This bandage can be removed in 48 hours and replaced in with Band-Aids as needed. After 48 hours you may wash your hands and shower, but no submerging.    If your sutures are visible (black strings), they will be removed about 10-14 days after surgery.  You should make an appointment to see your physician 10-14 after surgery.    2. Activity     Most people underestimate the length of time required to fully recover from surgery.  It is recommended you take some pain medication the evening following your surgery so when the local anesthetic wears off (in the middle of the night), you are not in severe pain.   With pain medication, start at the lowest dose that controls your pain.       After the first 1-3 days, we encourage you to balance your activity between ambulation, sitting in a chair, and resting in bed with your arm elevated. Let swelling and pain be your guide to activity, you should avoid prolonged (over 20 minutes) standing or sitting. In general, limit your activities to home for the first 1-3 days.    3. Pain    You will be discharged to home with a prescription for oral pain medication. A most important factor in pain control is rest and elevation. Ice may also be applied to the operative site for 30 minute intervals. Please use a waterproof bag for ice or cold packs.  Again, as you feel the numbness resolving and some onset of discomfort, please take pain medication, don't wait till full resolution of block.   Try to use the lowest dose of pain medication that controls your pain.       The pain medication may cause constipation. Drink plenty of fluids, eat fruits and vegetables. You may use an over the counter laxative or stool softener if necessary. Take pain medication with some food in your stomach, as prescribed by your pharmacist.     4. Elevation     Elevation of the operative extremity is critical. Elevation reduces swelling and minimizes pain. Less swelling is associated with a lower infectious rate, fewer wound complications, less post-operative stiffness, and more rapid recovery of function. To keep the swelling down, your hand must be kept above the level of your heart.

## 2024-02-20 NOTE — ANESTHESIA POSTPROCEDURE EVALUATION
Patient: Neftali Schmitt    Procedure Summary       Date: 02/20/24 Room / Location: [facility] OR 06 / Virtual [facility] OR    Anesthesia Start: 0832 Anesthesia Stop: 0857    Procedure: left endoscopic carpal tunnel release (MAC/LOCAL) (Left: Wrist) Diagnosis:       Carpal tunnel syndrome of left wrist      (Carpal tunnel syndrome of left wrist [G56.02])    Surgeons: Eze Zamorano DO Responsible Provider: CHARLES Andrews    Anesthesia Type: MAC ASA Status: 3            Anesthesia Type: MAC    Vitals Value Taken Time   /62 02/20/24 0901   Temp 98.6 02/20/24 0903   Pulse 54 02/20/24 0902   Resp 18 02/20/24 0902   SpO2 99 % 02/20/24 0902   Vitals shown include unvalidated device data.    Anesthesia Post Evaluation    Patient location during evaluation: PACU  Patient participation: complete - patient participated  Level of consciousness: awake and alert  Pain score: 0  Pain management: adequate  Airway patency: patent  Cardiovascular status: acceptable  Respiratory status: acceptable  Hydration status: acceptable  Postoperative Nausea and Vomiting: none      There were no known notable events for this encounter.

## 2024-02-20 NOTE — ANESTHESIA PREPROCEDURE EVALUATION
Patient: Neftali Schmitt    Procedure Information       Date/Time: 02/20/24 0830    Procedure: left endoscopic carpal tunnel release (MAC/LOCAL) (Left: Wrist) - local/mac 15 minute procedure no special equipment    Location: [facility] OR 06 / Virtual [facility] OR    Surgeons: Eze Zamorano, DO            Relevant Problems   Anesthesia (within normal limits)      Cardiovascular   (+) Abnormal EKG   (+) Benign essential hypertension   (+) Bilateral carotid artery disease (CMS/HCC)   (+) CAD (coronary artery disease)   (+) Hyperlipidemia      Endocrine   (+) Severe obesity (BMI 35.0-39.9) with comorbidity (CMS/HCC)      GI   (+) GERD (gastroesophageal reflux disease)      /Renal (within normal limits)      Neuro/Psych   (+) Bilateral carotid artery disease (CMS/HCC)   (+) Bilateral carpal tunnel syndrome   (+) Carpal tunnel syndrome of left wrist      Pulmonary (within normal limits)      GI/Hepatic (within normal limits)      Hematology (within normal limits)      Musculoskeletal   (+) Bilateral carpal tunnel syndrome   (+) Carpal tunnel syndrome of left wrist   (+) Chronic neck pain   (+) DJD (degenerative joint disease), cervical   (+) DJD (degenerative joint disease), lumbar      Eyes, Ears, Nose, and Throat   (+) Sensorineural hearing loss (SNHL), bilateral      Infectious Disease   (+) COVID       Clinical information reviewed:   Tobacco  Allergies  Meds  Problems  Med Hx  Surg Hx   Fam Hx  Soc   Hx        NPO Detail:  NPO/Void Status  Date of Last Liquid: 02/19/24  Date of Last Solid: 02/19/24         Physical Exam    Airway  Mallampati: II  TM distance: >3 FB  Neck ROM: full     Cardiovascular - normal exam     Dental   (+) upper dentures  Comments: Lower with some missing teeth.  Remaining are intact   Pulmonary - normal exam     Abdominal   (+) obese         Anesthesia Plan    History of general anesthesia?: yes  History of complications of general anesthesia?: no    ASA 3     MAC     The patient is not a  current smoker.    intravenous induction   Postoperative administration of opioids is intended.  Anesthetic plan and risks discussed with patient.  Use of blood products discussed with patient who.    Plan discussed with CRNA.

## 2024-02-21 ASSESSMENT — PAIN SCALES - GENERAL: PAINLEVEL_OUTOF10: 5 - MODERATE PAIN

## 2024-02-28 ENCOUNTER — OFFICE VISIT (OUTPATIENT)
Dept: VASCULAR SURGERY | Facility: CLINIC | Age: 79
End: 2024-02-28
Payer: MEDICARE

## 2024-02-28 VITALS
WEIGHT: 225 LBS | HEART RATE: 63 BPM | BODY MASS INDEX: 36.32 KG/M2 | DIASTOLIC BLOOD PRESSURE: 69 MMHG | SYSTOLIC BLOOD PRESSURE: 146 MMHG

## 2024-02-28 DIAGNOSIS — R09.89 CAROTID BRUIT, UNSPECIFIED LATERALITY: Primary | ICD-10-CM

## 2024-02-28 PROCEDURE — 1125F AMNT PAIN NOTED PAIN PRSNT: CPT | Performed by: INTERNAL MEDICINE

## 2024-02-28 PROCEDURE — 1160F RVW MEDS BY RX/DR IN RCRD: CPT | Performed by: INTERNAL MEDICINE

## 2024-02-28 PROCEDURE — 3077F SYST BP >= 140 MM HG: CPT | Performed by: INTERNAL MEDICINE

## 2024-02-28 PROCEDURE — 99213 OFFICE O/P EST LOW 20 MIN: CPT | Performed by: INTERNAL MEDICINE

## 2024-02-28 PROCEDURE — 1036F TOBACCO NON-USER: CPT | Performed by: INTERNAL MEDICINE

## 2024-02-28 PROCEDURE — 3078F DIAST BP <80 MM HG: CPT | Performed by: INTERNAL MEDICINE

## 2024-02-28 PROCEDURE — 1123F ACP DISCUSS/DSCN MKR DOCD: CPT | Performed by: INTERNAL MEDICINE

## 2024-02-28 PROCEDURE — 1159F MED LIST DOCD IN RCRD: CPT | Performed by: INTERNAL MEDICINE

## 2024-02-28 ASSESSMENT — ENCOUNTER SYMPTOMS
PSYCHIATRIC NEGATIVE: 1
CARDIOVASCULAR NEGATIVE: 1
MUSCULOSKELETAL NEGATIVE: 1
NEUROLOGICAL NEGATIVE: 1
RESPIRATORY NEGATIVE: 1
GASTROINTESTINAL NEGATIVE: 1
EYES NEGATIVE: 1
CONSTITUTIONAL NEGATIVE: 1
ALLERGIC/IMMUNOLOGIC NEGATIVE: 1
ENDOCRINE NEGATIVE: 1
HEMATOLOGIC/LYMPHATIC NEGATIVE: 1

## 2024-02-28 NOTE — PROGRESS NOTES
Subjective   Patient ID: Neftali Schmitt is a 78 y.o. male who presents for Follow-up (1 yr carotid ).  HPI  78 year old male with a past medical history of carotid artery stenosis, HTN, HLD, thrombophlebitis, vertigo, diabetes mellitus type 2, obesity, GERD and DJD that presents to the office for a yearly follow up for carotid artery stenosis. Last year duplex shows 50% stenosis on right and 50-69% on left. CTA shows moderate narrowing on the left and severe narrowing on right.  He remains asymptomatic, denies limb weakness, dizziness or vision changes. Denies any previous history of a stroke or TIA. He is on ASA and a daily Lipitor.     Vascular testing:  CTA neck 2/23: Complex fibrocalcific plaque noted in the left carotid bifurcation/proximal internal carotid artery, causing approximately 35-40% narrowing, immediately after this, there is focal ectasia of  the left proximal internal carotid artery of up to 9.6 mm  Fibrocalcific plaque at the origins of the vertebral arteries noted, causing moderate to severe narrowing on the right side, moderate narrowing on the left side.    Carotid ultrasound 1/23: Mild-to-moderate   atheromatous plaque, most prominent at the left  bifurcation with less than 50% right stenosis and 50-69% left  stenosis by plaque estimate percentage criteria.    Review of Systems   Constitutional: Negative.    HENT: Negative.     Eyes: Negative.    Respiratory: Negative.     Cardiovascular: Negative.    Gastrointestinal: Negative.    Endocrine: Negative.    Genitourinary: Negative.    Musculoskeletal: Negative.    Skin: Negative.    Allergic/Immunologic: Negative.    Neurological: Negative.    Hematological: Negative.    Psychiatric/Behavioral: Negative.         Objective   Physical Exam  Constitutional:       Appearance: He is obese.   Eyes:      Pupils: Pupils are equal, round, and reactive to light.   Neck:      Vascular: Carotid bruit present.   Cardiovascular:      Rate and Rhythm:  Normal rate.   Pulmonary:      Effort: Pulmonary effort is normal.   Abdominal:      General: Bowel sounds are normal.   Musculoskeletal:         General: Normal range of motion.   Skin:     General: Skin is warm.      Capillary Refill: Capillary refill takes less than 2 seconds.   Neurological:      General: No focal deficit present.      Mental Status: He is alert.   Psychiatric:         Mood and Affect: Mood normal.         Assessment/Plan   78 year old male with a past medical history of carotid artery stenosis, HTN, HLD, thrombophlebitis, vertigo, diabetes mellitus type 2, obesity, GERD and DJD that presents to the office for a yearly follow up for carotid artery stenosis.    Plan:  Dicussed stroke like symptoms and when to seek medical attention  Follow up in a year  Continue ASA and statin  Will order a carotid duplex- will call with results            LILIA Elaine-CNP 02/28/24 11:33 AM

## 2024-03-04 ENCOUNTER — OFFICE VISIT (OUTPATIENT)
Dept: ORTHOPEDIC SURGERY | Facility: CLINIC | Age: 79
End: 2024-03-04
Payer: MEDICARE

## 2024-03-04 DIAGNOSIS — G56.02 CARPAL TUNNEL SYNDROME OF LEFT WRIST: Primary | ICD-10-CM

## 2024-03-04 PROCEDURE — 1036F TOBACCO NON-USER: CPT | Performed by: ORTHOPAEDIC SURGERY

## 2024-03-04 PROCEDURE — 99024 POSTOP FOLLOW-UP VISIT: CPT | Performed by: ORTHOPAEDIC SURGERY

## 2024-03-04 PROCEDURE — 1160F RVW MEDS BY RX/DR IN RCRD: CPT | Performed by: ORTHOPAEDIC SURGERY

## 2024-03-04 PROCEDURE — 1125F AMNT PAIN NOTED PAIN PRSNT: CPT | Performed by: ORTHOPAEDIC SURGERY

## 2024-03-04 PROCEDURE — 1123F ACP DISCUSS/DSCN MKR DOCD: CPT | Performed by: ORTHOPAEDIC SURGERY

## 2024-03-04 PROCEDURE — 1159F MED LIST DOCD IN RCRD: CPT | Performed by: ORTHOPAEDIC SURGERY

## 2024-03-04 NOTE — PROGRESS NOTES
78 y.o. male presents today for for follow up after left endoscopic carpal tunnel release. The patient reports doing well, no issues, night pain gone. The DOS was 2 weeks ago. Pain is controlled. Patient denies numbness and tingling except in the fingertips which seems to be going down recently he states.  States he is very happy with having it done.  May consider having his right done in the future but his symptoms are not bad right now    Review of Systems    Constitutional: see HPI, no fever, no chills, not feeling tired, no significant weight gain or weight loss.   Eyes: No vision changes  ENT: no nosebleeds.   Cardiovascular: no chest pain.   Respiratory: no shortness of breath and no cough.   Gastrointestinal: no abdominal pain, no nausea, no vomiting and no diarrhea.   Musculoskeletal: per HPI  Neurological: no headache, no gait disturbances  Psychiatric: no depression and no sleep disturbances.   Endocrine: no muscle weakness and no muscle cramps.   Hematologic/Lymphatic: no swollen glands and no tendency for easy bruising or excessive swelling.     Patient's past medical history, past surgical history, allergies, and medications have been reviewed unless otherwise noted in the chart.     Hand/Wrist Post-Op Exam  Inspection:  no evidence of infection, no erythema, Palpation:  compartments are soft, Range of Motion:  F/E 80/80, S/P 90/90 Finger ROM Full extension, full flexion Stability:  stable Strength:  5/5 F/E, 5/5 Adduction/Abduction 5/5 Opposition Skin:  incision site clean, dry and intact, healing without complication, Vascular:  capillary refill <2 seconds distally, Sensation:  intact to light touch distally.    Constitutional   General appearance: Alert and in no acute distress. Well developed, well nourished.    Eyes   External Eye, Conjunctiva and lids: Normal external exam - pupils were equal in size, round, reactive to light (PERRL) with normal accommodation and extraocular movements intact  (EOMI).   Ears, Nose, Mouth, and Throat   Hearing: Normal.   Neck   Neck: No neck mass was observed. Supple.   Pulmonary   Respiratory effort: No respiratory distress.   Cardiovascular   Examination of extremities: No peripheral edema.   Psychiatric   Judgment and insight: Intact.   Orientation to person, place, and time: Alert and oriented x 3.       Mood and affect: Normal.      2 weeks status post left endoscopic carpal tunnel release  Based on the history, physical exam and imaging studies above, the patient's presentation is consistent with the above diagnosis.  I had a long discussion with the patient regarding their presentation and the treatment options.    We again discussed her treatment options going forward along with their associated risks and benefits. After thorough discussion, the patient has elected to proceed with postoperative care. All questions were answered to the patients satisfaction who seems satisfied with the plan.  They will call the office with any questions/concerns.     Sutures removed  Steris  Vitamin E cream prn to scar tissue  Activities as tolerated  FU 4-6 weeks prn - no XR

## 2024-03-13 ENCOUNTER — TELEPHONE (OUTPATIENT)
Dept: VASCULAR SURGERY | Facility: CLINIC | Age: 79
End: 2024-03-13

## 2024-03-13 ENCOUNTER — HOSPITAL ENCOUNTER (OUTPATIENT)
Dept: VASCULAR MEDICINE | Facility: HOSPITAL | Age: 79
Discharge: HOME | End: 2024-03-13
Payer: MEDICARE

## 2024-03-13 DIAGNOSIS — R09.89 CAROTID BRUIT, UNSPECIFIED LATERALITY: ICD-10-CM

## 2024-03-13 PROCEDURE — 93880 EXTRACRANIAL BILAT STUDY: CPT

## 2024-03-13 PROCEDURE — 93880 EXTRACRANIAL BILAT STUDY: CPT | Performed by: SURGERY

## 2024-03-13 NOTE — TELEPHONE ENCOUNTER
----- Message from BRIANNA Elaine sent at 3/13/2024  3:22 PM EDT -----  Attempted to call, no answer, left VM to CB  Carotid duplex- shows no significant change  Has a year follow up arranged  ----- Message -----  From: Cricket, Syngo - Cardiology Results In  Sent: 3/13/2024   2:24 PM EDT  To: BRIANNA Elaine

## 2024-03-13 NOTE — TELEPHONE ENCOUNTER
Result Communication    Resulted Orders   Vascular US carotid artery duplex bilateral    [facility]  [address] [id number]       Phone [phone] Fax [phone]       Vascular Lab Report     [facility]    Patient Name:      ANEUDY ROHAN MCCORMICK  Reading Physician:  [id number] Rogers Elliott MD  Study Date:        3/13/2024           Ordering Provider:  [id number] LEN COOLEY  MRN/PID:           [id number]            Fellow:  Accession#:        [id number]        Technologist:       Rosalind Talbert RVT  Date of Birth/Age: 1945 / 78      Technologist 2:                     years  Gender:            M                   Encounter#:         [id number]  Admission Status:  Outpatient          Location Performed: [facility]       Diagnosis/ICD: Other specified symptoms and signs involving the circulatory and                 respiratory systems-R09.89  CPT Codes:     95896 Cerebrovascular Carotid Duplex scan complete       CONCLUSIONS:  Right Carotid: Findings are consistent with less than 50% stenosis of the right proximal internal carotid artery. Laminar flow seen by color Doppler. There are elevated velocities in the right ECA that are suggestive of disease. No evidence of hemodynamically significant stenosis of the right common carotid artery. The right vertebral artery is patent with antegrade flow. No evidence of hemodynamically significant stenosis in the right subclavian artery.  Left Carotid: Findings are consistent with 50 to 69% stenosis of the left proximal internal carotid artery. Turbulent flow seen by color Doppler. There are elevated velocities in the left ECA that are suggestive of disease. The left vertebral artery is patent with antegrade flow. No evidence of hemodynamically significant stenosis in the left subclavian artery.     Imaging & Doppler  Findings:  Right Plaque Morph: The proximal right internal carotid artery demonstrates calcified plaque. The distal right common carotid artery demonstrates calcified plaque.  Left Plaque Morph: The proximal left internal carotid artery demonstrates calcified plaque. The distal left common carotid artery demonstrates calcified plaque.      Right                        Left    PSV      EDV                PSV      EDV  58 cm/s  7 cm/s    CCA P    115 cm/s 9 cm/s  62 cm/s  6 cm/s    CCA D    58 cm/s  8 cm/s  55 cm/s  6 cm/s    ICA P    151 cm/s 9 cm/s  76 cm/s  11 cm/s   ICA M    131 cm/s 13 cm/s  50 cm/s  8 cm/s    ICA D    64 cm/s  14 cm/s  242 cm/s 11 cm/s    ECA     267 cm/s 15 cm/s  42 cm/s  4 cm/s  Vertebral  55 cm/s  10 cm/s  197 cm/s 0 cm/s  Subclavian 199 cm/s 0 cm/s                     Right Left  ICA/CCA Ratio  0.9  2.6          [id number] Rogers Elliott MD  Electronically signed by [id number] Rogers Elliott MD on 3/13/2024 at 2:24:47 PM         ** Final **         3:25 PM      Results were successfully communicated with the patient and they acknowledged their understanding.

## 2024-03-20 ENCOUNTER — LAB (OUTPATIENT)
Dept: LAB | Facility: LAB | Age: 79
End: 2024-03-20
Payer: MEDICARE

## 2024-03-20 DIAGNOSIS — E11.9 DIABETES MELLITUS TYPE 2, DIET-CONTROLLED (MULTI): ICD-10-CM

## 2024-03-20 DIAGNOSIS — Z12.5 SCREENING FOR PROSTATE CANCER: ICD-10-CM

## 2024-03-20 DIAGNOSIS — I10 BENIGN ESSENTIAL HYPERTENSION: ICD-10-CM

## 2024-03-20 DIAGNOSIS — Z13.29 THYROID DISORDER SCREEN: ICD-10-CM

## 2024-03-20 DIAGNOSIS — E78.2 MIXED HYPERLIPIDEMIA: ICD-10-CM

## 2024-03-20 DIAGNOSIS — E55.9 VITAMIN D DEFICIENCY: ICD-10-CM

## 2024-03-20 LAB
25(OH)D3 SERPL-MCNC: 26 NG/ML (ref 30–100)
ALBUMIN SERPL BCP-MCNC: 4.3 G/DL (ref 3.4–5)
ALP SERPL-CCNC: 64 U/L (ref 33–136)
ALT SERPL W P-5'-P-CCNC: 47 U/L (ref 10–52)
ANION GAP SERPL CALC-SCNC: 12 MMOL/L (ref 10–20)
AST SERPL W P-5'-P-CCNC: 30 U/L (ref 9–39)
BILIRUB SERPL-MCNC: 0.8 MG/DL (ref 0–1.2)
BUN SERPL-MCNC: 15 MG/DL (ref 6–23)
CALCIUM SERPL-MCNC: 8.8 MG/DL (ref 8.6–10.3)
CHLORIDE SERPL-SCNC: 102 MMOL/L (ref 98–107)
CHOLEST SERPL-MCNC: 131 MG/DL (ref 0–199)
CHOLESTEROL/HDL RATIO: 2.7
CO2 SERPL-SCNC: 28 MMOL/L (ref 21–32)
CREAT SERPL-MCNC: 0.87 MG/DL (ref 0.5–1.3)
EGFRCR SERPLBLD CKD-EPI 2021: 87 ML/MIN/1.73M*2
ERYTHROCYTE [DISTWIDTH] IN BLOOD BY AUTOMATED COUNT: 13.9 % (ref 11.5–14.5)
EST. AVERAGE GLUCOSE BLD GHB EST-MCNC: 151 MG/DL
GLUCOSE SERPL-MCNC: 108 MG/DL (ref 74–99)
HBA1C MFR BLD: 6.9 %
HCT VFR BLD AUTO: 45.3 % (ref 41–52)
HDLC SERPL-MCNC: 49.4 MG/DL
HGB BLD-MCNC: 14.8 G/DL (ref 13.5–17.5)
LDLC SERPL CALC-MCNC: 60 MG/DL
MCH RBC QN AUTO: 31 PG (ref 26–34)
MCHC RBC AUTO-ENTMCNC: 32.7 G/DL (ref 32–36)
MCV RBC AUTO: 95 FL (ref 80–100)
NON HDL CHOLESTEROL: 82 MG/DL (ref 0–149)
NRBC BLD-RTO: 0 /100 WBCS (ref 0–0)
PLATELET # BLD AUTO: 229 X10*3/UL (ref 150–450)
POTASSIUM SERPL-SCNC: 3.6 MMOL/L (ref 3.5–5.3)
PROT SERPL-MCNC: 6.9 G/DL (ref 6.4–8.2)
PSA SERPL-MCNC: 0.68 NG/ML
RBC # BLD AUTO: 4.77 X10*6/UL (ref 4.5–5.9)
SODIUM SERPL-SCNC: 138 MMOL/L (ref 136–145)
TRIGL SERPL-MCNC: 110 MG/DL (ref 0–149)
TSH SERPL-ACNC: 3.19 MIU/L (ref 0.44–3.98)
VLDL: 22 MG/DL (ref 0–40)
WBC # BLD AUTO: 7.2 X10*3/UL (ref 4.4–11.3)

## 2024-03-20 PROCEDURE — G0103 PSA SCREENING: HCPCS

## 2024-03-20 PROCEDURE — 84443 ASSAY THYROID STIM HORMONE: CPT

## 2024-03-20 PROCEDURE — 36415 COLL VENOUS BLD VENIPUNCTURE: CPT

## 2024-03-20 PROCEDURE — 80061 LIPID PANEL: CPT

## 2024-03-20 PROCEDURE — 80053 COMPREHEN METABOLIC PANEL: CPT

## 2024-03-20 PROCEDURE — 85027 COMPLETE CBC AUTOMATED: CPT

## 2024-03-20 PROCEDURE — 83036 HEMOGLOBIN GLYCOSYLATED A1C: CPT

## 2024-03-20 PROCEDURE — 82306 VITAMIN D 25 HYDROXY: CPT

## 2024-03-26 ENCOUNTER — OFFICE VISIT (OUTPATIENT)
Dept: PRIMARY CARE | Facility: CLINIC | Age: 79
End: 2024-03-26
Payer: MEDICARE

## 2024-03-26 VITALS
WEIGHT: 225.4 LBS | TEMPERATURE: 97.8 F | HEART RATE: 57 BPM | SYSTOLIC BLOOD PRESSURE: 134 MMHG | BODY MASS INDEX: 36.22 KG/M2 | OXYGEN SATURATION: 91 % | RESPIRATION RATE: 16 BRPM | DIASTOLIC BLOOD PRESSURE: 72 MMHG | HEIGHT: 66 IN

## 2024-03-26 DIAGNOSIS — I10 BENIGN ESSENTIAL HYPERTENSION: ICD-10-CM

## 2024-03-26 DIAGNOSIS — D32.0 CEREBRAL MENINGIOMA (MULTI): ICD-10-CM

## 2024-03-26 DIAGNOSIS — E66.01 SEVERE OBESITY (BMI 35.0-39.9) WITH COMORBIDITY (MULTI): ICD-10-CM

## 2024-03-26 DIAGNOSIS — I77.9 BILATERAL CAROTID ARTERY DISEASE, UNSPECIFIED TYPE (CMS-HCC): ICD-10-CM

## 2024-03-26 DIAGNOSIS — E11.9 DIABETES MELLITUS TYPE 2, DIET-CONTROLLED (MULTI): ICD-10-CM

## 2024-03-26 DIAGNOSIS — Z00.00 MEDICARE ANNUAL WELLNESS VISIT, SUBSEQUENT: Primary | ICD-10-CM

## 2024-03-26 DIAGNOSIS — E78.2 MIXED HYPERLIPIDEMIA: ICD-10-CM

## 2024-03-26 DIAGNOSIS — E55.9 VITAMIN D DEFICIENCY: ICD-10-CM

## 2024-03-26 DIAGNOSIS — I65.23 BILATERAL CAROTID ARTERY STENOSIS: ICD-10-CM

## 2024-03-26 PROBLEM — M25.562 ACUTE PAIN OF LEFT KNEE: Status: RESOLVED | Noted: 2023-10-09 | Resolved: 2024-03-26

## 2024-03-26 PROBLEM — R42 DIZZINESS: Status: RESOLVED | Noted: 2023-02-09 | Resolved: 2024-03-26

## 2024-03-26 PROBLEM — U07.1 COVID: Status: RESOLVED | Noted: 2023-12-28 | Resolved: 2024-03-26

## 2024-03-26 PROCEDURE — 99214 OFFICE O/P EST MOD 30 MIN: CPT | Performed by: FAMILY MEDICINE

## 2024-03-26 PROCEDURE — 1124F ACP DISCUSS-NO DSCNMKR DOCD: CPT | Performed by: FAMILY MEDICINE

## 2024-03-26 PROCEDURE — G0439 PPPS, SUBSEQ VISIT: HCPCS | Performed by: FAMILY MEDICINE

## 2024-03-26 PROCEDURE — 3078F DIAST BP <80 MM HG: CPT | Performed by: FAMILY MEDICINE

## 2024-03-26 PROCEDURE — 1159F MED LIST DOCD IN RCRD: CPT | Performed by: FAMILY MEDICINE

## 2024-03-26 PROCEDURE — 1036F TOBACCO NON-USER: CPT | Performed by: FAMILY MEDICINE

## 2024-03-26 PROCEDURE — 1160F RVW MEDS BY RX/DR IN RCRD: CPT | Performed by: FAMILY MEDICINE

## 2024-03-26 PROCEDURE — 3075F SYST BP GE 130 - 139MM HG: CPT | Performed by: FAMILY MEDICINE

## 2024-03-26 PROCEDURE — 1170F FXNL STATUS ASSESSED: CPT | Performed by: FAMILY MEDICINE

## 2024-03-26 ASSESSMENT — ENCOUNTER SYMPTOMS
DEPRESSION: 0
CHEST TIGHTNESS: 0
ABDOMINAL PAIN: 0
FEVER: 0
CONFUSION: 0
ARTHRALGIAS: 0
SHORTNESS OF BREATH: 0
LOSS OF SENSATION IN FEET: 0
CHILLS: 0
PALPITATIONS: 0
OCCASIONAL FEELINGS OF UNSTEADINESS: 0

## 2024-03-26 ASSESSMENT — ACTIVITIES OF DAILY LIVING (ADL)
DRESSING: INDEPENDENT
DOING_HOUSEWORK: INDEPENDENT
MANAGING_FINANCES: INDEPENDENT
BATHING: INDEPENDENT
TAKING_MEDICATION: INDEPENDENT
GROCERY_SHOPPING: INDEPENDENT

## 2024-03-26 NOTE — ASSESSMENT & PLAN NOTE
Recent labs reviewed    He refuses all immunization  Refuses colon cancer screening    Work on low-fat low-cholesterol diabetic diet and weight loss strategies

## 2024-03-26 NOTE — ASSESSMENT & PLAN NOTE
A1c is gone up to 6.9% reviewed diabetic diet and lifestyle modifications with patient he was to hold off on additional medicine for now.  He is recommended that he keep yearly diabetic eye exams.  Return to our office in 4 months with repeat fasting labs.

## 2024-03-26 NOTE — PROGRESS NOTES
"Subjective   Reason for Visit: Neftali Schmitt is an 78 y.o. male here for a Medicare Wellness visit.     Past Medical, Surgical, and Family History reviewed and updated in chart.    Reviewed all medications by prescribing practitioner or clinical pharmacist (such as prescriptions, OTCs, herbal therapies and supplements) and documented in the medical record.    HPI patient today for follow-up of ongoing healthcare issues and review of recent lab work overall is been feeling good.  Denies any major new acute complaints.  Had carotid ultrasound updated in February things there were stable.  He admits he does not really follow proper diet when it comes to his diabetes.    Patient Care Team:  Kiran Beard MD as PCP - General  Kiran Beard MD as PCP - [employer] PCP     Review of Systems   Constitutional:  Negative for chills and fever.   HENT:  Negative for congestion and ear pain.    Eyes:  Negative for visual disturbance.   Respiratory:  Negative for chest tightness and shortness of breath.    Cardiovascular:  Negative for chest pain and palpitations.   Gastrointestinal:  Negative for abdominal pain.   Musculoskeletal:  Negative for arthralgias.   Skin:  Negative for pallor.   Psychiatric/Behavioral:  Negative for confusion.        Objective   Vitals:  /72 (BP Location: Right arm, Patient Position: Sitting, BP Cuff Size: Adult long)   Pulse 57   Temp 36.6 °C (97.8 °F) (Temporal)   Resp 16   Ht 1.676 m (5' 6\")   Wt 102 kg (225 lb 6.4 oz)   SpO2 91%   BMI 36.38 kg/m²       Physical Exam  Vitals and nursing note reviewed.   Constitutional:       General: He is not in acute distress.     Appearance: Normal appearance. He is not ill-appearing.   HENT:      Head: Normocephalic and atraumatic.      Right Ear: Tympanic membrane, ear canal and external ear normal.      Left Ear: Tympanic membrane, ear canal and external ear normal.      Mouth/Throat:      Pharynx: Oropharynx is clear. "   Eyes:      Extraocular Movements: Extraocular movements intact.   Cardiovascular:      Rate and Rhythm: Normal rate and regular rhythm.      Pulses: Normal pulses.      Heart sounds: Normal heart sounds.   Pulmonary:      Effort: Pulmonary effort is normal.      Breath sounds: Normal breath sounds.   Abdominal:      General: Abdomen is flat. Bowel sounds are normal.      Palpations: Abdomen is soft.      Tenderness: There is no abdominal tenderness.   Musculoskeletal:         General: Normal range of motion.      Cervical back: Neck supple.   Skin:     General: Skin is warm.   Neurological:      Mental Status: He is alert and oriented to person, place, and time. Mental status is at baseline.   Psychiatric:         Mood and Affect: Mood normal.       Recent Results (from the past 1008 hour(s))   POCT GLUCOSE    Collection Time: 02/20/24  7:34 AM   Result Value Ref Range    POCT Glucose 110 (H) 74 - 99 mg/dL   CBC    Collection Time: 02/20/24  7:36 AM   Result Value Ref Range    WBC 7.2 4.4 - 11.3 x10*3/uL    nRBC 0.0 0.0 - 0.0 /100 WBCs    RBC 4.68 4.50 - 5.90 x10*6/uL    Hemoglobin 14.7 13.5 - 17.5 g/dL    Hematocrit 42.7 41.0 - 52.0 %    MCV 91 80 - 100 fL    MCH 31.4 26.0 - 34.0 pg    MCHC 34.4 32.0 - 36.0 g/dL    RDW 13.5 11.5 - 14.5 %    Platelets 215 150 - 450 x10*3/uL   Basic Metabolic Panel    Collection Time: 02/20/24  7:36 AM   Result Value Ref Range    Glucose 116 (H) 74 - 99 mg/dL    Sodium 139 136 - 145 mmol/L    Potassium 3.6 3.5 - 5.3 mmol/L    Chloride 103 98 - 107 mmol/L    Bicarbonate 26 21 - 32 mmol/L    Anion Gap 14 10 - 20 mmol/L    Urea Nitrogen 15 6 - 23 mg/dL    Creatinine 0.86 0.50 - 1.30 mg/dL    eGFR 88 >60 mL/min/1.73m*2    Calcium 9.2 8.6 - 10.3 mg/dL   ECG 12 lead    Collection Time: 02/20/24  8:01 AM   Result Value Ref Range    Ventricular Rate 59 BPM    Atrial Rate 58 BPM    NJ Interval 197 ms    QRS Duration 127 ms    QT Interval 463 ms    QTC Calculation(Bazett) 459 ms    P Axis -4  degrees    R Axis -8 degrees    T Axis 98 degrees    QRS Count 9 beats    Q Onset 249 ms    T Offset 481 ms    QTC Fredericia 460 ms   CBC    Collection Time: 03/20/24  7:03 AM   Result Value Ref Range    WBC 7.2 4.4 - 11.3 x10*3/uL    nRBC 0.0 0.0 - 0.0 /100 WBCs    RBC 4.77 4.50 - 5.90 x10*6/uL    Hemoglobin 14.8 13.5 - 17.5 g/dL    Hematocrit 45.3 41.0 - 52.0 %    MCV 95 80 - 100 fL    MCH 31.0 26.0 - 34.0 pg    MCHC 32.7 32.0 - 36.0 g/dL    RDW 13.9 11.5 - 14.5 %    Platelets 229 150 - 450 x10*3/uL   Comprehensive Metabolic Panel    Collection Time: 03/20/24  7:03 AM   Result Value Ref Range    Glucose 108 (H) 74 - 99 mg/dL    Sodium 138 136 - 145 mmol/L    Potassium 3.6 3.5 - 5.3 mmol/L    Chloride 102 98 - 107 mmol/L    Bicarbonate 28 21 - 32 mmol/L    Anion Gap 12 10 - 20 mmol/L    Urea Nitrogen 15 6 - 23 mg/dL    Creatinine 0.87 0.50 - 1.30 mg/dL    eGFR 87 >60 mL/min/1.73m*2    Calcium 8.8 8.6 - 10.3 mg/dL    Albumin 4.3 3.4 - 5.0 g/dL    Alkaline Phosphatase 64 33 - 136 U/L    Total Protein 6.9 6.4 - 8.2 g/dL    AST 30 9 - 39 U/L    Bilirubin, Total 0.8 0.0 - 1.2 mg/dL    ALT 47 10 - 52 U/L   Hemoglobin A1C    Collection Time: 03/20/24  7:03 AM   Result Value Ref Range    Hemoglobin A1C 6.9 (H) see below %    Estimated Average Glucose 151 Not Established mg/dL   Lipid Panel    Collection Time: 03/20/24  7:03 AM   Result Value Ref Range    Cholesterol 131 0 - 199 mg/dL    HDL-Cholesterol 49.4 mg/dL    Cholesterol/HDL Ratio 2.7     LDL Calculated 60 <=99 mg/dL    VLDL 22 0 - 40 mg/dL    Triglycerides 110 0 - 149 mg/dL    Non HDL Cholesterol 82 0 - 149 mg/dL   Prostate Specific Antigen, Screen    Collection Time: 03/20/24  7:03 AM   Result Value Ref Range    Prostate Specific Antigen,Screen 0.68 <=4.00 ng/mL   Vitamin D 25-Hydroxy,Total (for eval of Vitamin D levels)    Collection Time: 03/20/24  7:03 AM   Result Value Ref Range    Vitamin D, 25-Hydroxy, Total 26 (L) 30 - 100 ng/mL   TSH with reflex to Free  T4 if abnormal    Collection Time: 03/20/24  7:03 AM   Result Value Ref Range    Thyroid Stimulating Hormone 3.19 0.44 - 3.98 mIU/L     Recent labs reviewed with patient    A1c is gone up to 6.9% reviewed diabetic diet and lifestyle modification  Vitamin D is improving continue to monitor and supplement    Medication list was reviewed and updated    Keep yearly diabetic eye exams    Carotid ultrasound report from February 2024 reviewed with patient overall thinks there appeared stable.  Continue to follow with vascular surgery.    Return to office in 4 months with repeat fasting labs      Assessment/Plan   Problem List Items Addressed This Visit       Benign essential hypertension    Current Assessment & Plan     Stable continue current medication         Relevant Orders    Follow Up In Primary Care - Established    Comprehensive Metabolic Panel    Cerebral meningioma (CMS/HCC)    Overview     Head CT 1/11/2023 stable         Current Assessment & Plan     Clinically remained stable         Hyperlipidemia    Current Assessment & Plan     Lipids stable continue atorvastatin and Zetia along with proper diet         Relevant Orders    Follow Up In Primary Care - Established    Comprehensive Metabolic Panel    Lipid Panel    Vitamin D deficiency    Current Assessment & Plan     Improving continue vitamin D3 5000 units daily         Relevant Orders    Vitamin D 25-Hydroxy,Total (for eval of Vitamin D levels)    Bilateral carotid artery disease (CMS/HCC)    Overview     Bilateral carotid ultrasound 1/24/2023  CT angio of neck 2/21/2023  Patient follows with vascular surgery/Dr. Inman  Carotid ultrasound 2/2024         Current Assessment & Plan     Clinically stable ultrasound February 2024 stable continue to follow with vascular surgery         Medicare annual wellness visit, subsequent - Primary    Current Assessment & Plan     Recent labs reviewed    He refuses all immunization  Refuses colon cancer screening    Work on  low-fat low-cholesterol diabetic diet and weight loss strategies         Diabetes mellitus type 2, diet-controlled (CMS/[facility])    Current Assessment & Plan     A1c is gone up to 6.9% reviewed diabetic diet and lifestyle modifications with patient he was to hold off on additional medicine for now.  He is recommended that he keep yearly diabetic eye exams.  Return to our office in 4 months with repeat fasting labs.         Relevant Orders    Follow Up In Primary Care - Established    Comprehensive Metabolic Panel    Hemoglobin A1C    Severe obesity (BMI 35.0-39.9) with comorbidity (CMS/HCC)    Current Assessment & Plan     Reviewed diet and weight loss strategies in effort to lose weight

## 2024-05-07 ENCOUNTER — OFFICE VISIT (OUTPATIENT)
Dept: PRIMARY CARE | Facility: CLINIC | Age: 79
End: 2024-05-07
Payer: MEDICARE

## 2024-05-07 VITALS
TEMPERATURE: 97.3 F | HEART RATE: 58 BPM | SYSTOLIC BLOOD PRESSURE: 136 MMHG | DIASTOLIC BLOOD PRESSURE: 72 MMHG | BODY MASS INDEX: 34.54 KG/M2 | RESPIRATION RATE: 14 BRPM | WEIGHT: 214 LBS | OXYGEN SATURATION: 96 %

## 2024-05-07 DIAGNOSIS — I10 BENIGN ESSENTIAL HYPERTENSION: ICD-10-CM

## 2024-05-07 DIAGNOSIS — R07.81 RIB PAIN ON RIGHT SIDE: Primary | ICD-10-CM

## 2024-05-07 PROCEDURE — 3075F SYST BP GE 130 - 139MM HG: CPT | Performed by: FAMILY MEDICINE

## 2024-05-07 PROCEDURE — 1159F MED LIST DOCD IN RCRD: CPT | Performed by: FAMILY MEDICINE

## 2024-05-07 PROCEDURE — 1123F ACP DISCUSS/DSCN MKR DOCD: CPT | Performed by: FAMILY MEDICINE

## 2024-05-07 PROCEDURE — 1160F RVW MEDS BY RX/DR IN RCRD: CPT | Performed by: FAMILY MEDICINE

## 2024-05-07 PROCEDURE — 3078F DIAST BP <80 MM HG: CPT | Performed by: FAMILY MEDICINE

## 2024-05-07 PROCEDURE — 99213 OFFICE O/P EST LOW 20 MIN: CPT | Performed by: FAMILY MEDICINE

## 2024-05-07 PROCEDURE — 1036F TOBACCO NON-USER: CPT | Performed by: FAMILY MEDICINE

## 2024-05-07 ASSESSMENT — ENCOUNTER SYMPTOMS
RESPIRATORY NEGATIVE: 1
CARDIOVASCULAR NEGATIVE: 1
GASTROINTESTINAL NEGATIVE: 1
CONSTITUTIONAL NEGATIVE: 1

## 2024-05-07 NOTE — ASSESSMENT & PLAN NOTE
Appears to be muscle skeletal may be severe nerve irritation monitor for now currently symptoms are resolved.  Check x-ray of the chest and right rib cage call for results.

## 2024-05-07 NOTE — PROGRESS NOTES
Subjective   Patient ID: Neftali Schmitt is a 78 y.o. male who presents for right side pain (For 2 weeks).    HPI patient in today complaining of right lateral rib pain currently has resolved but it is an intermittent recurrent issue.  Seems to be associated with certain positions.  No shortness of breath no chest pain.  Sometimes it last for a day or 2 then goes away.  He notices some hypersensitivity to touch in his garments never developed any sort of rash.    Review of Systems   Constitutional: Negative.    Respiratory: Negative.     Cardiovascular: Negative.    Gastrointestinal: Negative.    Musculoskeletal:         Right rib pain       Objective   /72   Pulse 58   Temp 36.3 °C (97.3 °F)   Resp 14   Wt 97.1 kg (214 lb)   SpO2 96%   BMI 34.54 kg/m²     Physical Exam  Vitals and nursing note reviewed.   Constitutional:       General: He is not in acute distress.     Appearance: Normal appearance. He is not ill-appearing.   HENT:      Head: Normocephalic and atraumatic.      Right Ear: Tympanic membrane, ear canal and external ear normal.      Left Ear: Tympanic membrane, ear canal and external ear normal.      Mouth/Throat:      Pharynx: Oropharynx is clear.   Eyes:      Extraocular Movements: Extraocular movements intact.   Cardiovascular:      Rate and Rhythm: Normal rate and regular rhythm.      Pulses: Normal pulses.      Heart sounds: Normal heart sounds.   Pulmonary:      Effort: Pulmonary effort is normal.      Breath sounds: Normal breath sounds.   Abdominal:      General: Abdomen is flat. Bowel sounds are normal.      Palpations: Abdomen is soft.      Tenderness: There is no abdominal tenderness.   Musculoskeletal:         General: No swelling, tenderness or deformity. Normal range of motion.      Cervical back: Neck supple.      Comments: No tenderness palpation of the paravertebral muscles of the thoracic spine.  No tenderness to palpation of the right rib cage.  There is no rash  erythema or other gross deformities.  Range of motion grossly intact.   Skin:     General: Skin is warm.   Neurological:      Mental Status: He is alert and oriented to person, place, and time. Mental status is at baseline.   Psychiatric:         Mood and Affect: Mood normal.     Intermittent right rib pain.  Check x-ray of the chest and right ribs.  Monitor for now conservative treatment for relief with over-the-counter ice and/or Tylenol/Advil.  Call if anything worsens otherwise keep regular follow-up appointment in July.    Assessment/Plan   Problem List Items Addressed This Visit             ICD-10-CM    Benign essential hypertension I10     Stable continue current treatment         Rib pain on right side - Primary R07.81     Appears to be muscle skeletal may be severe nerve irritation monitor for now currently symptoms are resolved.  Check x-ray of the chest and right rib cage call for results.         Relevant Orders    XR ribs right 2 views w chest pa or ap

## 2024-05-08 ENCOUNTER — HOSPITAL ENCOUNTER (OUTPATIENT)
Dept: RADIOLOGY | Facility: CLINIC | Age: 79
Discharge: HOME | End: 2024-05-08
Payer: MEDICARE

## 2024-05-08 DIAGNOSIS — R07.81 RIB PAIN ON RIGHT SIDE: ICD-10-CM

## 2024-05-08 PROCEDURE — 71101 X-RAY EXAM UNILAT RIBS/CHEST: CPT | Mod: RT

## 2024-05-08 PROCEDURE — 71101 X-RAY EXAM UNILAT RIBS/CHEST: CPT | Mod: RIGHT SIDE | Performed by: STUDENT IN AN ORGANIZED HEALTH CARE EDUCATION/TRAINING PROGRAM

## 2024-05-19 DIAGNOSIS — E78.49 OTHER HYPERLIPIDEMIA: ICD-10-CM

## 2024-06-10 PROBLEM — I77.9 VERTEBRAL ARTERY DISEASE (CMS-HCC): Status: ACTIVE | Noted: 2024-06-10

## 2024-06-10 PROBLEM — E66.9 OBESITY (BMI 30-39.9): Status: ACTIVE | Noted: 2024-06-10

## 2024-06-11 NOTE — PROGRESS NOTES
[facility] Heart and Vascular Cardiology    Patient Name: Neftali Schmitt  Patient : 1945      Scribe Attestation  By signing my name below, I, Buzz Shearer   attest that this documentation has been prepared under the direction and in the presence of James Stewart DO.      Reason for visit:  This is an 78-year-old male here for follow-up regarding his history of coronary artery disease status post bypass done in [facility] in , carotid/vertebral artery disease followed by Vascular Surgery, hypertension, dyslipidemia, and obesity.    HPI:  This is an 78-year-old male here for follow-up regarding his history of coronary artery disease status post bypass done in [facility] in , carotid/vertebral artery disease followed by Vascular Surgery, hypertension, dyslipidemia, and obesity.  The patient was last evaluated by me in 2023.  At that visit patient was doing reasonably well and I asked that he follow-up in 6 months.  The patient was subsequently seen in 2023 at which time he was doing reasonably well and asked to follow-up again in 6 months.  Patient subsequently underwent a carpal tunnel release on his left wrist in 2024.  Patient was last seen by Vascular Surgery in 2024 at which time a carotid ultrasound was ordered he was asked to follow-up in a year.  CMP done in 2024 showed normal serum sodium and potassium with a serum creatinine 0.87, normal ALT/AST, hemoglobin A1c was 6.9%, TSH was 3.19, CBC showed a hemoglobin of 14.8.  Lipid panel done in 2024 showed an LDL cholesterol of 60 and triglycerides of 110 while on atorvastatin 80 mg daily and Zetia 10 mg daily.  Carotid ultrasound done in 2024 showed less than 50% right internal carotid artery stenosis and 50 to 69% left internal carotid artery stenosis. ECG done today showed sinus bradycardia with a heart rate of 59 bpm. The patient reports that he has been feeling  generally well from the cardiac standpoint. He denies any new chest pain, shortness of breath, palpitations and lightheadedness. He states that he has lost weight since the last visit by eating healthier food and regular exercise. He states that he takes all of his medications as prescribed. During my exam, he was resting comfortably on the exam table.            Assessment/Plan:   1. Coronary artery disease  The patient has a reported history of coronary artery disease with prior bypass surgery done in [facility] in 2007.  ECG done today showed sinus bradycardia with a heart rate of 59 bpm.   He denies anginal chest discomfort.  Blood pressure appears controlled on exam today.  He should continue his current antihypertensive medications and antiplatelet therapy.  Echocardiogram done in November 2021 showed normal left ventricular systolic function with an ejection fraction of 60 to 65%, normal right ventricular systolic function, mild mitral valve regurgitation, and mildly dilated ascending aorta measured at 3.9 cm.  Recent lab works as noted in the HPI.  Lipid panel done in March 2024 showed an LDL cholesterol of 60 and triglycerides of 110 while on atorvastatin 80 mg daily and Zetia 10 mg daily.    Lab works as noted below will be done in 6 months prior to his next visit.   Please see lifestyle recommendations below.  Follow up in 6 months and sooner if necessary.      2. Carotid/Vertebral artery disease  The patient has carotid/vertebral artery disease followed by Vascular Surgery.  CTA of the neck done in February 2023 showed complex fibrocalcific plaque noted in the left carotid bifurcation/proximal internal carotid artery causing 35 to 40% narrowing, focal ectasia of the left proximal internal carotid artery of up to 9.6 mm, fibrocalcific plaque at the origins of the vertebral arteries causing moderate to severe narrowing on the right side and moderate narrowing on the left side.  Carotid ultrasound done in  March 2024 showed less than 50% right internal carotid artery stenosis and 50 to 69% left internal carotid artery stenosis.  Continue risk factor modification.     3. Hypertension  Patient has a history of hypertension which appears controlled on exam today.  He should continue his current antihypertensive medications.      4. Dyslipidemia  Lipid panel done in March 2024 showed an LDL cholesterol of 60 and triglycerides of 110 while on atorvastatin 80 mg daily and Zetia 10 mg daily.    Please see lifestyle recommendations below.      5. Obesity  The patient states that he has has lost weight since the last visit by eating healthier food and regular exercise.  Please see lifestyle recommendations below.      Orders:   BMP/magnesium in 6 months,   Follow-up in 6 months.    Lifestyle Recommendations  I recommend a whole-food plant-based diet, an eating pattern that encourages the consumption of unrefined plant foods (such as fruits, vegetables, tubers, whole grains, legumes, nuts and seeds) and discourages meats, dairy products, eggs and processed foods.     The AHA/ACC recommends that the patient consume a dietary pattern that emphasizes intake of vegetables, fruits, and whole grains; includes low-fat dairy products, poultry, fish, legumes, non-tropical vegetable oils, and nuts; and limits intake of sodium, sweets, sugar-sweetened beverages, and red meats.  Adapt this dietary pattern to appropriate calorie requirements (a 500-750 kcal/day deficit to loose weight), personal and cultural food preferences, and nutrition therapy for other medical conditions (including diabetes).  Achieve this pattern by following plans such as the [place] Mediterranean, DASH dietary pattern, or AHA diet.     Engage in 2 hours and 30 minutes per week of moderate-intensity physical activity, or 1 hour and 15 minutes (75 minutes) per week of vigorous-intensity aerobic physical activity, or an equivalent combination of moderate and  vigorous-intensity aerobic physical activity. Aerobic activity should be performed in episodes of at least 10 minutes preferably spread throughout the week.     Adhering to a heart healthy diet, regular exercise habits, avoidance of tobacco products, and maintenance of a healthy weight are crucial components of their heart disease risk reduction.     Any positive review of systems not specifically addressed in the office visit today should be evaluated and treated by the patients primary care physician or in an emergency department if necessary     Patient was notified that results from ordered tests will be called to the patient if it changes current management; it will otherwise be discussed at a future appointment and available on  [place].     Thank you for allowing me to participate in the care of this patient.        This document was generated using the assistance of voice recognition software. If there are any errors of spelling, grammar, syntax, or meaning; please feel free to contact me directly for clarification.    Past Medical History:  He has a past medical history of Abnormal auditory perception of both ears (02/09/2023), Immunization not carried out because of patient refusal, Personal history of other diseases of the circulatory system, Personal history of other diseases of the musculoskeletal system and connective tissue, and Personal history of other drug therapy.    Past Surgical History:  He has a past surgical history that includes Other surgical history (07/17/2020) and CT angio neck (2/21/2023).      Social History:  He reports that he quit smoking about 34 years ago. His smoking use included cigarettes. He has been exposed to tobacco smoke. He has never used smokeless tobacco. He reports current alcohol use of about 2.0 standard drinks of alcohol per week. He reports that he does not use drugs.    Family History:  Family History   Problem Relation Name Age of Onset    Cancer Mother       "Cancer Father          Allergies:  Losartan    Outpatient Medications:  Current Outpatient Medications   Medication Instructions    amLODIPine (NORVASC) 10 mg, oral, Daily    aspirin 81 mg EC tablet     atorvastatin (LIPITOR) 80 mg, oral, Daily    chlorthalidone (HYGROTON) 25 mg, oral, Daily    cholecalciferol (VITAMIN D3) 5,000 Units, oral, Daily    ezetimibe (ZETIA) 10 mg, oral, Daily    meclizine (Antivert) 25 mg tablet     metoprolol succinate XL (TOPROL-XL) 25 mg, oral, Daily        ROS:  A 14 point review of systems was done and is negative other than as stated in HPI    Vitals:      2/20/2024     9:00 AM 2/20/2024     9:10 AM 2/20/2024     9:20 AM 2/20/2024     9:30 AM 2/28/2024    11:31 AM 3/26/2024     8:22 AM 5/7/2024     5:00 PM   Vitals   Systolic 126 137 144 137 146 134 136   Diastolic 62 66 80 75 69 72 72   Heart Rate 54 56 60 58 63 57 58   Temp      36.6 °C (97.8 °F) 36.3 °C (97.3 °F)   Resp 17 16 14 16  16 14   Height (in)      1.676 m (5' 6\")    Weight (lb)     225 225.4 214   BMI     36.32 kg/m2 36.38 kg/m2 34.54 kg/m2   BSA (m2)     2.18 m2 2.18 m2 2.13 m2        Physical Exam:   Constitutional: Cooperative, in no acute distress, alert, appears stated age.  Skin: Skin color, texture, turgor normal. No rashes or lesions.  Head: Normocephalic. No masses, lesions, tenderness or abnormalities  Eyes: Extraocular movements are grossly intact.  Mouth and throat: Mucous membranes moist  Neck: Neck supple, no carotid bruits, no JVD  Respiratory: Lungs clear to auscultation, no wheezing or rhonchi, no use of accessory muscles  Chest wall: Sternal scar, normal excursion with respiration  Cardiovascular: Regular rhythm without murmur  Gastrointestinal: Abdomen soft, nontender. Bowel sounds normal. Obese  Musculoskeletal: Strength equal in upper extremities  Extremities: Trivial pitting bilateral lower extremity edema.  Neurologic: Sensation grossly intact, alert and oriented x3        Intake/Output:   No " intake/output data recorded.    Outpatient Medications  Current Outpatient Medications on File Prior to Visit   Medication Sig Dispense Refill    amLODIPine (Norvasc) 10 mg tablet Take 1 tablet (10 mg) by mouth once daily. 90 tablet 3    aspirin 81 mg EC tablet       atorvastatin (Lipitor) 80 mg tablet Take 1 tablet (80 mg) by mouth once daily. 90 tablet 3    chlorthalidone (Hygroton) 25 mg tablet Take 1 tablet (25 mg) by mouth once daily. 90 tablet 3    cholecalciferol (Vitamin D3) 5,000 Units tablet Take 1 tablet (5,000 Units) by mouth once daily.      ezetimibe (Zetia) 10 mg tablet Take 1 tablet (10 mg) by mouth once daily. 90 tablet 3    meclizine (Antivert) 25 mg tablet       metoprolol succinate XL (Toprol-XL) 25 mg 24 hr tablet Take 1 tablet (25 mg) by mouth once daily. 90 tablet 3     No current facility-administered medications on file prior to visit.       Labs: (past 26 weeks)  Recent Results (from the past 4368 hour(s))   POCT GLUCOSE    Collection Time: 02/20/24  7:34 AM   Result Value Ref Range    POCT Glucose 110 (H) 74 - 99 mg/dL   CBC    Collection Time: 02/20/24  7:36 AM   Result Value Ref Range    WBC 7.2 4.4 - 11.3 x10*3/uL    nRBC 0.0 0.0 - 0.0 /100 WBCs    RBC 4.68 4.50 - 5.90 x10*6/uL    Hemoglobin 14.7 13.5 - 17.5 g/dL    Hematocrit 42.7 41.0 - 52.0 %    MCV 91 80 - 100 fL    MCH 31.4 26.0 - 34.0 pg    MCHC 34.4 32.0 - 36.0 g/dL    RDW 13.5 11.5 - 14.5 %    Platelets 215 150 - 450 x10*3/uL   Basic Metabolic Panel    Collection Time: 02/20/24  7:36 AM   Result Value Ref Range    Glucose 116 (H) 74 - 99 mg/dL    Sodium 139 136 - 145 mmol/L    Potassium 3.6 3.5 - 5.3 mmol/L    Chloride 103 98 - 107 mmol/L    Bicarbonate 26 21 - 32 mmol/L    Anion Gap 14 10 - 20 mmol/L    Urea Nitrogen 15 6 - 23 mg/dL    Creatinine 0.86 0.50 - 1.30 mg/dL    eGFR 88 >60 mL/min/1.73m*2    Calcium 9.2 8.6 - 10.3 mg/dL   ECG 12 lead    Collection Time: 02/20/24  8:01 AM   Result Value Ref Range    Ventricular Rate 59  BPM    Atrial Rate 58 BPM    OR Interval 197 ms    QRS Duration 127 ms    QT Interval 463 ms    QTC Calculation(Bazett) 459 ms    P Axis -4 degrees    R Axis -8 degrees    T Axis 98 degrees    QRS Count 9 beats    Q Onset 249 ms    T Offset 481 ms    QTC Fredericia 460 ms   CBC    Collection Time: 03/20/24  7:03 AM   Result Value Ref Range    WBC 7.2 4.4 - 11.3 x10*3/uL    nRBC 0.0 0.0 - 0.0 /100 WBCs    RBC 4.77 4.50 - 5.90 x10*6/uL    Hemoglobin 14.8 13.5 - 17.5 g/dL    Hematocrit 45.3 41.0 - 52.0 %    MCV 95 80 - 100 fL    MCH 31.0 26.0 - 34.0 pg    MCHC 32.7 32.0 - 36.0 g/dL    RDW 13.9 11.5 - 14.5 %    Platelets 229 150 - 450 x10*3/uL   Comprehensive Metabolic Panel    Collection Time: 03/20/24  7:03 AM   Result Value Ref Range    Glucose 108 (H) 74 - 99 mg/dL    Sodium 138 136 - 145 mmol/L    Potassium 3.6 3.5 - 5.3 mmol/L    Chloride 102 98 - 107 mmol/L    Bicarbonate 28 21 - 32 mmol/L    Anion Gap 12 10 - 20 mmol/L    Urea Nitrogen 15 6 - 23 mg/dL    Creatinine 0.87 0.50 - 1.30 mg/dL    eGFR 87 >60 mL/min/1.73m*2    Calcium 8.8 8.6 - 10.3 mg/dL    Albumin 4.3 3.4 - 5.0 g/dL    Alkaline Phosphatase 64 33 - 136 U/L    Total Protein 6.9 6.4 - 8.2 g/dL    AST 30 9 - 39 U/L    Bilirubin, Total 0.8 0.0 - 1.2 mg/dL    ALT 47 10 - 52 U/L   Hemoglobin A1C    Collection Time: 03/20/24  7:03 AM   Result Value Ref Range    Hemoglobin A1C 6.9 (H) see below %    Estimated Average Glucose 151 Not Established mg/dL   Lipid Panel    Collection Time: 03/20/24  7:03 AM   Result Value Ref Range    Cholesterol 131 0 - 199 mg/dL    HDL-Cholesterol 49.4 mg/dL    Cholesterol/HDL Ratio 2.7     LDL Calculated 60 <=99 mg/dL    VLDL 22 0 - 40 mg/dL    Triglycerides 110 0 - 149 mg/dL    Non HDL Cholesterol 82 0 - 149 mg/dL   Prostate Specific Antigen, Screen    Collection Time: 03/20/24  7:03 AM   Result Value Ref Range    Prostate Specific Antigen,Screen 0.68 <=4.00 ng/mL   Vitamin D 25-Hydroxy,Total (for eval of Vitamin D levels)     Collection Time: 03/20/24  7:03 AM   Result Value Ref Range    Vitamin D, 25-Hydroxy, Total 26 (L) 30 - 100 ng/mL   TSH with reflex to Free T4 if abnormal    Collection Time: 03/20/24  7:03 AM   Result Value Ref Range    Thyroid Stimulating Hormone 3.19 0.44 - 3.98 mIU/L       ECG  Encounter Date: 02/20/24   ECG 12 lead   Result Value    Ventricular Rate 59    Atrial Rate 58    GA Interval 197    QRS Duration 127    QT Interval 463    QTC Calculation(Bazett) 459    P Axis -4    R Axis -8    T Axis 98    QRS Count 9    Q Onset 249    T Offset 481    QTC Fredericia 460    Narrative    Sinus rhythm  Nonspecific intraventricular conduction delay  Borderline ST depression, lateral leads    Confirmed by Krista Stewart ([id number]) on 2/20/2024 11:03:00 AM       Echocardiogram  No results found for this or any previous visit from the past 1095 days.      CV Studies:  EKG:  Encounter Date: 02/20/24   ECG 12 lead   Result Value    Ventricular Rate 59    Atrial Rate 58    GA Interval 197    QRS Duration 127    QT Interval 463    QTC Calculation(Bazett) 459    P Axis -4    R Axis -8    T Axis 98    QRS Count 9    Q Onset 249    T Offset 481    QTC Fredericia 460    Narrative    Sinus rhythm  Nonspecific intraventricular conduction delay  Borderline ST depression, lateral leads    Confirmed by Krista Stewart ([id number]) on 2/20/2024 11:03:00 AM     Echocardiogram:   [place] [address]  Phone [phone] Fax [phone]    TRANSTHORACIC ECHOCARDIOGRAM REPORT      Patient Name:     ANEUDY Johsn Physician:  [id number] Nestor MCCORMICK MD  Study Date:       11/8/2021        Referring           [id number] KRISTA STEWART  Physician:  MRN/PID:          [id number]         PCP:  Accession/Order#: [id number]     [facility] Echo Lab  Location:  YOB: 1945        Fellow:  Gender:           ROHAN                 Nurse:  Admit Date:                        Sonographer:        Veronica Linder [facility]  Admission Status: Outpatient       Additional Staff:  Height:           172.72 cm        CC Report to:  Weight:           95.26 kg         Study Type:         Echocardiogram  BSA:              2.09 m2  Blood Pressure: 120 /70 mmHg    Diagnosis/ICD: R94.31-Abnormal electrocardiogram [ECG] [EKG]; I10-Essential  (primary) hypertension; I25.10-Atherosclerotic heart disease of  native coronary artery without angina pectoris  Indication:    CABG-2008  Procedure/CPT: Echo Complete w Full Doppler-[id number]  Study Detail: The following Echo studies were performed: 2D, M-Mode, Doppler and  color flow.      PHYSICIAN INTERPRETATION:  Left Ventricle: The left ventricular systolic function is normal, with an estimated ejection fraction of 60-65%. There are no regional wall motion abnormalities. The left ventricular cavity size is normal. Spectral Doppler shows a normal pattern of left ventricular diastolic filling.  Left Atrium: The left atrium is mildly dilated.  Right Ventricle: The right ventricle is normal in size. There is normal right ventricular global systolic function.  Right Atrium: The right atrium is normal in size.  Aortic Valve: The aortic valve is trileaflet. There is no evidence of aortic valve regurgitation.  Mitral Valve: The mitral valve is normal in structure. There is trace mitral valve regurgitation.  Tricuspid Valve: The tricuspid valve is structurally normal. There is mild tricuspid regurgitation.  Pulmonic Valve: The pulmonic valve is structurally normal. There is physiologic pulmonic valve regurgitation.  Pericardium: There is no pericardial effusion noted.  Aorta: The aortic root is abnormal. There is mild dilatation of the ascending aorta.      CONCLUSIONS:  1. The left ventricular systolic function is normal with a 60-65% estimated ejection fraction.    QUANTITATIVE DATA SUMMARY:  2D MEASUREMENTS:  Normal  Ranges:  IVSd:          1.00 cm   (0.6-1.1cm)  LVPWd:         1.10 cm   (0.6-1.1cm)  LVIDd:         5.10 cm   (3.9-5.9cm)  LVIDs:         3.50 cm  LV Mass Index: 96.2 g/m2  LV % FS        31.4 %    LA VOLUME:  Normal Ranges:  LA Vol A4C:        62.1 ml    (22+/-6mL/m2)  LA Vol A2C:        68.4 ml  LA Vol BP:         69.0 ml  LA Vol Index A4C:  29.8ml/m2  LA Vol Index A2C:  32.8 ml/m2  LA Vol Index BP:   33.1 ml/m2  LA Area A4C:       21.8 cm2  LA Area A2C:       21.6 cm2  LA Major Axis A4C: 6.5 cm  LA Major Axis A2C: 5.8 cm  LA Volume Index:   32.9 ml/m2    RA VOLUME BY A/L METHOD:  Normal Ranges:  RA Area A4C: 12.5 cm2    M-MODE MEASUREMENTS:  Normal Ranges:  Ao Root: 4.00 cm (2.0-3.7cm)  AoV Exc: 2.00 cm (1.5-2.5cm)  LAs:     3.70 cm (2.7-4.0cm)    AORTA MEASUREMENTS:  Normal Ranges:  AoV Exc:     2.00 cm (1.5-2.5cm)  Ao Sinus, d: 3.38 cm (2.1-3.5cm)  Ao STJ, d:   2.90 cm (1.7-3.4cm)  Asc Ao, d:   3.90 cm (2.1-3.4cm)    LV SYSTOLIC FUNCTION BY 2D PLANIMETRY (MOD):  Normal Ranges:  EF-A4C View: 68.9 % (>55%)  EF-A2C View: 56.9 %  EF-Biplane:  66.1 %    LV DIASTOLIC FUNCTION:  Normal Ranges:  MV Peak E:        0.82 m/s    (0.7-1.2 m/s)  MV Peak A:        0.62 m/s    (0.42-0.7 m/s)  E/A Ratio:        1.32        (1.0-2.2)  MV e'             0.12 m/s    (>8.0)  MV lateral e'     0.11 m/s  MV medial e'      0.12 m/s  MV A Dur:         100.00 msec  E/e' Ratio:       7.12        (<8.0)  LV IVRT:          82 msec     (<100ms)  PulmV A Revs Dur: 127.00 msec    MITRAL VALVE:  Normal Ranges:  MV DT: 201 msec (150-240msec)    AORTIC VALVE:  Normal Ranges:  LVOT Max Bernard:  0.94 m/s (<1.1m/s)  LVOT VTI:      24.20 cm  LVOT Diameter: 2.10 cm  (1.8-2.4cm)    RIGHT VENTRICLE:  RV 1   3.6 cm  RV 2   2.3 cm  RV 3   7.4 cm  TAPSE: 25.0 mm  RV s'  0.18 m/s    TRICUSPID VALVE/RVSP:  Normal Ranges:  Peak TR Velocity: 2.59 m/s  RV Syst Pressure: 29.8 mmHg (< 30mmHg)  TV E Vmax:        0.48 m/s  (0.3-0.7m/s)  TV A Vmax:        0.47  m/s  IVC Diam:         0.90 cm    PULMONIC VALVE:  Normal Ranges:  PIEDV: 1.34 m/s  PADP:  10.2 mmHg    Pulmonary Veins:  PulmV A Revs Dur: 127.00 msec      [id number] Nestor Humphrey MD  Electronically signed on 11/9/2021 at 3:05:25 PM         Final     Stress Testing IMGRESULT([id number]:1:1825): No results found for this or any previous visit from the past 1825 days.    Cardiac Catheterization: No results found for this or any previous visit from the past 1825 days.  No results found for this or any previous visit from the past 3650 days.     Cardiac Scoring: No results found for this or any previous visit from the past 1825 days.    AAA : No results found for this or any previous visit from the past 1825 days.    OTHER: No results found for this or any previous visit from the past 1825 days.    LAST IMAGING RESULTS  XR ribs right 2 views w chest pa or ap  Interpreted By:  Anthony Hassan,   STUDY:  XR RIBS RIGHT 2 VIEWS WITH CHEST PA OR AP; 5/8/2024 11:54 am      INDICATION:  Signs/Symptoms:pain.      COMPARISON:  03/24/2017      ACCESSION NUMBER(S):  [id number]      ORDERING CLINICIAN:  RANDALL STEEL      TECHNIQUE:  Two views of the  right ribs and a frontal view of the chest were  obtained.      FINDINGS:  Median sternotomy wires. No acute displaced  right rib fracture is  identified.      No focal infiltrate, pleural effusion or pneumothorax on frontal view  of the chest. The cardiac silhouette is within normal limits for size.      IMPRESSION  No acute displaced  right rib fracture identified.      No acute cardiopulmonary process on frontal view of the chest.      Signed by: Anthony Hassan 5/11/2024 5:14 PM  Dictation workstation:   [id number]      Problem List Items Addressed This Visit       Benign essential hypertension    CAD (coronary artery disease) - Primary    Overview     Previous bypass in [facility] 2007  Patient follows with cardiology         Hyperlipidemia    Bilateral carotid artery disease  (CMS-HCC)    Overview     Bilateral carotid ultrasound 1/24/2023  CT angio of neck 2/21/2023  Patient follows with vascular surgery/Dr. Inman  Carotid ultrasound 2/2024         Vertebral artery disease (CMS-HCC)    Obesity (BMI 30-39.9)          James Stewart DO, FACC, FACOI

## 2024-06-13 DIAGNOSIS — E78.49 OTHER HYPERLIPIDEMIA: ICD-10-CM

## 2024-06-13 RX ORDER — ATORVASTATIN CALCIUM 80 MG/1
80 TABLET, FILM COATED ORAL DAILY
Qty: 90 TABLET | Refills: 3 | OUTPATIENT
Start: 2024-06-13

## 2024-06-13 RX ORDER — ATORVASTATIN CALCIUM 80 MG/1
80 TABLET, FILM COATED ORAL DAILY
Qty: 30 TABLET | Refills: 0 | Status: SHIPPED | OUTPATIENT
Start: 2024-06-13

## 2024-06-13 NOTE — TELEPHONE ENCOUNTER
Rx Refill Request Telephone Encounter    Name:  Neftali Schmitt  :  [id number]  Medication Name:      atorvastatin (Lipitor) 80 mg tablet   Route: Take 1 tablet (80 mg) by mouth once daily.    Specific Pharmacy location:  [address]   Date of last appointment:  24  Date of next appointment: 24   Best number to reach patient: [phone]

## 2024-06-24 ENCOUNTER — APPOINTMENT (OUTPATIENT)
Dept: CARDIOLOGY | Facility: CLINIC | Age: 79
End: 2024-06-24
Payer: MEDICARE

## 2024-06-24 VITALS
HEART RATE: 59 BPM | DIASTOLIC BLOOD PRESSURE: 68 MMHG | SYSTOLIC BLOOD PRESSURE: 134 MMHG | HEIGHT: 66 IN | WEIGHT: 211.4 LBS | BODY MASS INDEX: 33.97 KG/M2

## 2024-06-24 DIAGNOSIS — I25.10 CORONARY ARTERY DISEASE INVOLVING NATIVE CORONARY ARTERY OF NATIVE HEART WITHOUT ANGINA PECTORIS: Primary | ICD-10-CM

## 2024-06-24 DIAGNOSIS — E66.9 OBESITY (BMI 30-39.9): ICD-10-CM

## 2024-06-24 DIAGNOSIS — I77.9 VERTEBRAL ARTERY DISEASE (CMS-HCC): ICD-10-CM

## 2024-06-24 DIAGNOSIS — I10 BENIGN ESSENTIAL HYPERTENSION: ICD-10-CM

## 2024-06-24 DIAGNOSIS — E78.00 PURE HYPERCHOLESTEROLEMIA: ICD-10-CM

## 2024-06-24 DIAGNOSIS — I65.23 BILATERAL CAROTID ARTERY STENOSIS: ICD-10-CM

## 2024-06-24 PROCEDURE — 1159F MED LIST DOCD IN RCRD: CPT | Performed by: INTERNAL MEDICINE

## 2024-06-24 PROCEDURE — 1036F TOBACCO NON-USER: CPT | Performed by: INTERNAL MEDICINE

## 2024-06-24 PROCEDURE — 99214 OFFICE O/P EST MOD 30 MIN: CPT | Performed by: INTERNAL MEDICINE

## 2024-06-24 PROCEDURE — 3078F DIAST BP <80 MM HG: CPT | Performed by: INTERNAL MEDICINE

## 2024-06-24 PROCEDURE — 1123F ACP DISCUSS/DSCN MKR DOCD: CPT | Performed by: INTERNAL MEDICINE

## 2024-06-24 PROCEDURE — 93000 ELECTROCARDIOGRAM COMPLETE: CPT | Performed by: INTERNAL MEDICINE

## 2024-06-24 PROCEDURE — 3075F SYST BP GE 130 - 139MM HG: CPT | Performed by: INTERNAL MEDICINE

## 2024-07-10 ENCOUNTER — LAB (OUTPATIENT)
Dept: LAB | Facility: LAB | Age: 79
End: 2024-07-10
Payer: MEDICARE

## 2024-07-10 ENCOUNTER — TELEPHONE (OUTPATIENT)
Dept: PRIMARY CARE | Facility: CLINIC | Age: 79
End: 2024-07-10

## 2024-07-10 DIAGNOSIS — E78.49 OTHER HYPERLIPIDEMIA: ICD-10-CM

## 2024-07-10 DIAGNOSIS — E11.9 DIABETES MELLITUS TYPE 2, DIET-CONTROLLED (MULTI): ICD-10-CM

## 2024-07-10 DIAGNOSIS — I10 BENIGN ESSENTIAL HYPERTENSION: ICD-10-CM

## 2024-07-10 DIAGNOSIS — E78.2 MIXED HYPERLIPIDEMIA: ICD-10-CM

## 2024-07-10 DIAGNOSIS — E55.9 VITAMIN D DEFICIENCY: ICD-10-CM

## 2024-07-10 LAB
25(OH)D3 SERPL-MCNC: 39 NG/ML (ref 30–100)
ALBUMIN SERPL BCP-MCNC: 4.5 G/DL (ref 3.4–5)
ALP SERPL-CCNC: 70 U/L (ref 33–136)
ALT SERPL W P-5'-P-CCNC: 44 U/L (ref 10–52)
ANION GAP SERPL CALC-SCNC: 12 MMOL/L (ref 10–20)
AST SERPL W P-5'-P-CCNC: 34 U/L (ref 9–39)
BILIRUB SERPL-MCNC: 1.1 MG/DL (ref 0–1.2)
BUN SERPL-MCNC: 21 MG/DL (ref 6–23)
CALCIUM SERPL-MCNC: 9.6 MG/DL (ref 8.6–10.3)
CHLORIDE SERPL-SCNC: 102 MMOL/L (ref 98–107)
CHOLEST SERPL-MCNC: 134 MG/DL (ref 0–199)
CHOLESTEROL/HDL RATIO: 3.1
CO2 SERPL-SCNC: 31 MMOL/L (ref 21–32)
CREAT SERPL-MCNC: 0.88 MG/DL (ref 0.5–1.3)
EGFRCR SERPLBLD CKD-EPI 2021: 86 ML/MIN/1.73M*2
EST. AVERAGE GLUCOSE BLD GHB EST-MCNC: 137 MG/DL
GLUCOSE SERPL-MCNC: 114 MG/DL (ref 74–99)
HBA1C MFR BLD: 6.4 %
HDLC SERPL-MCNC: 43 MG/DL
LDLC SERPL CALC-MCNC: 69 MG/DL
NON HDL CHOLESTEROL: 91 MG/DL (ref 0–149)
POTASSIUM SERPL-SCNC: 4.9 MMOL/L (ref 3.5–5.3)
PROT SERPL-MCNC: 6.9 G/DL (ref 6.4–8.2)
SODIUM SERPL-SCNC: 140 MMOL/L (ref 136–145)
TRIGL SERPL-MCNC: 112 MG/DL (ref 0–149)
VLDL: 22 MG/DL (ref 0–40)

## 2024-07-10 PROCEDURE — 80061 LIPID PANEL: CPT

## 2024-07-10 PROCEDURE — 36415 COLL VENOUS BLD VENIPUNCTURE: CPT

## 2024-07-10 PROCEDURE — 82306 VITAMIN D 25 HYDROXY: CPT

## 2024-07-10 PROCEDURE — 80053 COMPREHEN METABOLIC PANEL: CPT

## 2024-07-10 PROCEDURE — 83036 HEMOGLOBIN GLYCOSYLATED A1C: CPT

## 2024-07-10 RX ORDER — ATORVASTATIN CALCIUM 80 MG/1
80 TABLET, FILM COATED ORAL DAILY
Qty: 90 TABLET | Refills: 0 | Status: SHIPPED | OUTPATIENT
Start: 2024-07-10

## 2024-07-10 NOTE — TELEPHONE ENCOUNTER
Rx Refill Request Telephone Encounter    Name:  Neftali Schmitt  :  [id number]  Medication Name:        atorvastatin (Lipitor) 80 mg tablet [[id number]]  1 tab taken by mouth once daily                  Specific Pharmacy location:  [facility] in [place]  Date of last appointment:  2024  Date of next appointment:  2024  Best number to reach patient:  [phone]

## 2024-07-18 ENCOUNTER — APPOINTMENT (OUTPATIENT)
Dept: PRIMARY CARE | Facility: CLINIC | Age: 79
End: 2024-07-18
Payer: MEDICARE

## 2024-07-18 VITALS
HEART RATE: 56 BPM | HEIGHT: 66 IN | BODY MASS INDEX: 33.11 KG/M2 | TEMPERATURE: 97.3 F | SYSTOLIC BLOOD PRESSURE: 126 MMHG | DIASTOLIC BLOOD PRESSURE: 55 MMHG | WEIGHT: 206 LBS | OXYGEN SATURATION: 96 % | RESPIRATION RATE: 14 BRPM

## 2024-07-18 DIAGNOSIS — I10 BENIGN ESSENTIAL HYPERTENSION: ICD-10-CM

## 2024-07-18 DIAGNOSIS — R07.81 RIB PAIN ON RIGHT SIDE: ICD-10-CM

## 2024-07-18 DIAGNOSIS — E78.2 MIXED HYPERLIPIDEMIA: ICD-10-CM

## 2024-07-18 DIAGNOSIS — E11.9 DIABETES MELLITUS TYPE 2, DIET-CONTROLLED (MULTI): Primary | ICD-10-CM

## 2024-07-18 DIAGNOSIS — E55.9 VITAMIN D DEFICIENCY: ICD-10-CM

## 2024-07-18 PROCEDURE — 3074F SYST BP LT 130 MM HG: CPT | Performed by: FAMILY MEDICINE

## 2024-07-18 PROCEDURE — 1159F MED LIST DOCD IN RCRD: CPT | Performed by: FAMILY MEDICINE

## 2024-07-18 PROCEDURE — 99214 OFFICE O/P EST MOD 30 MIN: CPT | Performed by: FAMILY MEDICINE

## 2024-07-18 PROCEDURE — 1123F ACP DISCUSS/DSCN MKR DOCD: CPT | Performed by: FAMILY MEDICINE

## 2024-07-18 PROCEDURE — 1036F TOBACCO NON-USER: CPT | Performed by: FAMILY MEDICINE

## 2024-07-18 PROCEDURE — 1160F RVW MEDS BY RX/DR IN RCRD: CPT | Performed by: FAMILY MEDICINE

## 2024-07-18 PROCEDURE — 3078F DIAST BP <80 MM HG: CPT | Performed by: FAMILY MEDICINE

## 2024-07-18 RX ORDER — METOPROLOL SUCCINATE 25 MG/1
25 TABLET, EXTENDED RELEASE ORAL DAILY
Qty: 90 TABLET | Refills: 3 | Status: SHIPPED | OUTPATIENT
Start: 2024-07-18 | End: 2025-07-18

## 2024-07-18 RX ORDER — CHLORTHALIDONE 25 MG/1
25 TABLET ORAL DAILY
Qty: 90 TABLET | Refills: 3 | Status: SHIPPED | OUTPATIENT
Start: 2024-07-18 | End: 2025-07-18

## 2024-07-18 RX ORDER — AMLODIPINE BESYLATE 10 MG/1
10 TABLET ORAL DAILY
Qty: 90 TABLET | Refills: 3 | Status: SHIPPED | OUTPATIENT
Start: 2024-07-18 | End: 2025-07-18

## 2024-07-18 RX ORDER — EZETIMIBE 10 MG/1
10 TABLET ORAL DAILY
Qty: 90 TABLET | Refills: 3 | Status: SHIPPED | OUTPATIENT
Start: 2024-07-18 | End: 2025-07-18

## 2024-07-18 ASSESSMENT — ENCOUNTER SYMPTOMS
PALPITATIONS: 0
CHEST TIGHTNESS: 0
FEVER: 0
SHORTNESS OF BREATH: 0
CONFUSION: 0
CHILLS: 0
ABDOMINAL PAIN: 0
ARTHRALGIAS: 0

## 2024-07-18 NOTE — ASSESSMENT & PLAN NOTE
A1c 6.4% nice improvement continue to work on dietary modification which she has been doing good job at long losing nearly 25 pounds with the first of the year.

## 2024-07-18 NOTE — PROGRESS NOTES
"Subjective   Patient ID: Neftali Schmitt is a 79 y.o. male who presents for Follow-up (4 month).    HPI   Patient today for follow-up of ongoing healthcare issues and review of recent lab work overalls been doing good.  He continues to work on dietary modifications with regards to weight loss and has been feeling much better.  Had recent cardiology checkup a few weeks ago says things there were stable.  Previous right rib pain he was experiencing is complete resolved.  Review of Systems   Constitutional:  Negative for chills and fever.   HENT:  Negative for congestion and ear pain.    Eyes:  Negative for visual disturbance.   Respiratory:  Negative for chest tightness and shortness of breath.    Cardiovascular:  Negative for chest pain and palpitations.   Gastrointestinal:  Negative for abdominal pain.   Musculoskeletal:  Negative for arthralgias.   Skin:  Negative for pallor.   Psychiatric/Behavioral:  Negative for confusion.        Objective   /55   Pulse 56   Temp 36.3 °C (97.3 °F)   Resp 14   Ht 1.676 m (5' 6\")   Wt 93.4 kg (206 lb)   SpO2 96%   BMI 33.25 kg/m²     Physical Exam  Vitals and nursing note reviewed.   Constitutional:       General: He is not in acute distress.     Appearance: Normal appearance. He is not ill-appearing.   HENT:      Head: Normocephalic and atraumatic.      Right Ear: Tympanic membrane, ear canal and external ear normal.      Left Ear: Tympanic membrane, ear canal and external ear normal.      Mouth/Throat:      Pharynx: Oropharynx is clear.   Eyes:      Extraocular Movements: Extraocular movements intact.   Cardiovascular:      Rate and Rhythm: Normal rate and regular rhythm.      Pulses: Normal pulses.      Heart sounds: Normal heart sounds.   Pulmonary:      Effort: Pulmonary effort is normal.      Breath sounds: Normal breath sounds.   Abdominal:      General: Abdomen is flat. Bowel sounds are normal.      Palpations: Abdomen is soft.      Tenderness: There is no " abdominal tenderness.   Musculoskeletal:         General: Normal range of motion.      Cervical back: Neck supple.   Skin:     General: Skin is warm.   Neurological:      Mental Status: He is alert and oriented to person, place, and time. Mental status is at baseline.   Psychiatric:         Mood and Affect: Mood normal.       Recent Results (from the past 1008 hour(s))   Comprehensive Metabolic Panel    Collection Time: 07/10/24  7:15 AM   Result Value Ref Range    Glucose 114 (H) 74 - 99 mg/dL    Sodium 140 136 - 145 mmol/L    Potassium 4.9 3.5 - 5.3 mmol/L    Chloride 102 98 - 107 mmol/L    Bicarbonate 31 21 - 32 mmol/L    Anion Gap 12 10 - 20 mmol/L    Urea Nitrogen 21 6 - 23 mg/dL    Creatinine 0.88 0.50 - 1.30 mg/dL    eGFR 86 >60 mL/min/1.73m*2    Calcium 9.6 8.6 - 10.3 mg/dL    Albumin 4.5 3.4 - 5.0 g/dL    Alkaline Phosphatase 70 33 - 136 U/L    Total Protein 6.9 6.4 - 8.2 g/dL    AST 34 9 - 39 U/L    Bilirubin, Total 1.1 0.0 - 1.2 mg/dL    ALT 44 10 - 52 U/L   Hemoglobin A1C    Collection Time: 07/10/24  7:15 AM   Result Value Ref Range    Hemoglobin A1C 6.4 (H) see below %    Estimated Average Glucose 137 Not Established mg/dL   Lipid Panel    Collection Time: 07/10/24  7:15 AM   Result Value Ref Range    Cholesterol 134 0 - 199 mg/dL    HDL-Cholesterol 43.0 mg/dL    Cholesterol/HDL Ratio 3.1     LDL Calculated 69 <=99 mg/dL    VLDL 22 0 - 40 mg/dL    Triglycerides 112 0 - 149 mg/dL    Non HDL Cholesterol 91 0 - 149 mg/dL   Vitamin D 25-Hydroxy,Total (for eval of Vitamin D levels)    Collection Time: 07/10/24  7:15 AM   Result Value Ref Range    Vitamin D, 25-Hydroxy, Total 39 30 - 100 ng/mL     Recent labs reviewed with patient overall numbers are stable he is to continue current medications refills provided continue to work on following a proper diet and weight loss strategies.  He continues to refuse all immunizations despite understanding potential benefit    He is return to office 4 months with repeat  fasting labs      Assessment/Plan   Problem List Items Addressed This Visit             ICD-10-CM    Benign essential hypertension I10     Stable continue current medication refills provided         Relevant Medications    amLODIPine (Norvasc) 10 mg tablet    chlorthalidone (Hygroton) 25 mg tablet    ezetimibe (Zetia) 10 mg tablet    metoprolol succinate XL (Toprol-XL) 25 mg 24 hr tablet    Other Relevant Orders    Follow Up In Primary Care - Established    Comprehensive Metabolic Panel    Hyperlipidemia E78.5     Stable continue atorvastatin and Zetia refills provided         Relevant Orders    Follow Up In Primary Care - Established    Comprehensive Metabolic Panel    Lipid Panel    Vitamin D deficiency E55.9     Vitamin D in normal range continue supplementation and monitoring         Relevant Orders    Follow Up In Primary Care - Established    Vitamin D 25-Hydroxy,Total (for eval of Vitamin D levels)    Diabetes mellitus type 2, diet-controlled (Multi) - Primary E11.9     A1c 6.4% nice improvement continue to work on dietary modification which she has been doing good job at long losing nearly 25 pounds with the first of the year.         Relevant Orders    Follow Up In Primary Care - Established    Comprehensive Metabolic Panel    Hemoglobin A1C    Rib pain on right side R07.81     X-rays were negative  Symptoms have completely resolved

## 2024-08-09 ENCOUNTER — HOSPITAL ENCOUNTER (OUTPATIENT)
Dept: RADIOLOGY | Facility: EXTERNAL LOCATION | Age: 79
Discharge: HOME | End: 2024-08-09

## 2024-08-09 DIAGNOSIS — S69.92XA FINGER INJURY, LEFT, INITIAL ENCOUNTER: ICD-10-CM

## 2024-08-12 ENCOUNTER — TELEPHONE (OUTPATIENT)
Dept: PRIMARY CARE | Facility: CLINIC | Age: 79
End: 2024-08-12
Payer: MEDICARE

## 2024-08-12 NOTE — TELEPHONE ENCOUNTER
Spoke to patient injury occurred on 8-8-24 patient did not get treated until 8-9-24. No sutures, scheduled 8-15-24

## 2024-08-12 NOTE — TELEPHONE ENCOUNTER
Patient went to the hospital on 8/8/24 for his finger being cut by a table saw, he would like to schedule a fuv to get his bandage removed but the only avail ible appointments are on 8/13 and 8/22 which are earlier and later than his 7-10 day fuv recommendation.     Please advise

## 2024-08-15 ENCOUNTER — OFFICE VISIT (OUTPATIENT)
Dept: PRIMARY CARE | Facility: CLINIC | Age: 79
End: 2024-08-15
Payer: MEDICARE

## 2024-08-15 VITALS
TEMPERATURE: 97.3 F | SYSTOLIC BLOOD PRESSURE: 156 MMHG | OXYGEN SATURATION: 96 % | RESPIRATION RATE: 16 BRPM | BODY MASS INDEX: 32.95 KG/M2 | HEIGHT: 66 IN | WEIGHT: 205 LBS | HEART RATE: 55 BPM | DIASTOLIC BLOOD PRESSURE: 63 MMHG

## 2024-08-15 DIAGNOSIS — S61.211D LACERATION OF LEFT INDEX FINGER WITHOUT FOREIGN BODY WITHOUT DAMAGE TO NAIL, SUBSEQUENT ENCOUNTER: Primary | ICD-10-CM

## 2024-08-15 PROBLEM — S61.211A LACERATION OF LEFT INDEX FINGER WITHOUT FOREIGN BODY WITHOUT DAMAGE TO NAIL: Status: ACTIVE | Noted: 2024-08-15

## 2024-08-15 PROCEDURE — 1124F ACP DISCUSS-NO DSCNMKR DOCD: CPT | Performed by: FAMILY MEDICINE

## 2024-08-15 PROCEDURE — 99213 OFFICE O/P EST LOW 20 MIN: CPT | Performed by: FAMILY MEDICINE

## 2024-08-15 PROCEDURE — 1036F TOBACCO NON-USER: CPT | Performed by: FAMILY MEDICINE

## 2024-08-15 PROCEDURE — 1159F MED LIST DOCD IN RCRD: CPT | Performed by: FAMILY MEDICINE

## 2024-08-15 PROCEDURE — 1160F RVW MEDS BY RX/DR IN RCRD: CPT | Performed by: FAMILY MEDICINE

## 2024-08-15 PROCEDURE — 3078F DIAST BP <80 MM HG: CPT | Performed by: FAMILY MEDICINE

## 2024-08-15 PROCEDURE — 3077F SYST BP >= 140 MM HG: CPT | Performed by: FAMILY MEDICINE

## 2024-08-15 ASSESSMENT — ENCOUNTER SYMPTOMS
WOUND: 1
CONSTITUTIONAL NEGATIVE: 1
RESPIRATORY NEGATIVE: 1
CARDIOVASCULAR NEGATIVE: 1

## 2024-08-15 NOTE — PROGRESS NOTES
"Subjective   Patient ID: Neftali Schmitt is a 79 y.o. male who presents for Follow-up (Follow up urgent care patient left point  finger wound).    HPI   Patient today for urgent care follow-up he says he cut the distal third of his left index finger on a table saw about a week ago subsequently went to urgent care several hours later he did x-ray which was negative they subsequently cleaned the wound and closed it and applied Steri-Strips but no stitches and had a compression bandage on it he is here today for evaluation.  Review of Systems   Constitutional: Negative.    Respiratory: Negative.     Cardiovascular: Negative.    Skin:  Positive for wound.       Objective   /63 (BP Location: Right arm, Patient Position: Sitting, BP Cuff Size: Adult long)   Pulse 55   Temp 36.3 °C (97.3 °F) (Temporal)   Resp 16   Ht 1.676 m (5' 6\")   Wt 93 kg (205 lb)   SpO2 96%   BMI 33.09 kg/m²     Physical Exam  Constitutional:       General: He is not in acute distress.     Appearance: Normal appearance. He is not ill-appearing.   Cardiovascular:      Rate and Rhythm: Normal rate and regular rhythm.      Heart sounds: Normal heart sounds.   Pulmonary:      Effort: Respiratory distress present.      Breath sounds: Normal breath sounds.   Skin:     Comments: Distal third left index finger palmar surface reveals well-healing vertical laceration ends are approximated and Steri-Strips are still in place.  Compression dressing was removed.  No cyanosis no edema no ecchymoses good capillary refill.  Flexion extension at the DIP joint as well as the entire index finger grossly intact.     Keep Follow-up appointment    Assessment/Plan   Problem List Items Addressed This Visit             ICD-10-CM    Laceration of left index finger without foreign body without damage to nail - Primary S61.211A     Well-healing distal palmar surface finger laceration  Steri-Strips in place reviewed local wound care anticipate continued " improvement in healing over the next few weeks.  Call if any questions or concerns.  Let Steri-Strips fall off naturally.

## 2024-08-15 NOTE — ASSESSMENT & PLAN NOTE
Well-healing distal palmar surface finger laceration  Steri-Strips in place reviewed local wound care anticipate continued improvement in healing over the next few weeks.  Call if any questions or concerns.  Let Steri-Strips fall off naturally.

## 2024-10-05 DIAGNOSIS — E78.49 OTHER HYPERLIPIDEMIA: ICD-10-CM

## 2024-10-07 ENCOUNTER — TELEPHONE (OUTPATIENT)
Dept: PRIMARY CARE | Facility: CLINIC | Age: 79
End: 2024-10-07
Payer: MEDICARE

## 2024-10-07 DIAGNOSIS — E78.49 OTHER HYPERLIPIDEMIA: ICD-10-CM

## 2024-10-07 RX ORDER — ATORVASTATIN CALCIUM 80 MG/1
80 TABLET, FILM COATED ORAL DAILY
Qty: 90 TABLET | Refills: 0 | OUTPATIENT
Start: 2024-10-07

## 2024-10-07 RX ORDER — ATORVASTATIN CALCIUM 80 MG/1
80 TABLET, FILM COATED ORAL DAILY
Qty: 90 TABLET | Refills: 0 | Status: SHIPPED | OUTPATIENT
Start: 2024-10-07

## 2024-10-07 NOTE — TELEPHONE ENCOUNTER
Rx Refill Request Telephone Encounter    Name:  Neftali Schmitt  :  [id number]  Medication Name:      atorvastatin (Lipitor) 80 mg tablet   Route: Take 1 tablet (80 mg) by mouth once daily.  Specific Pharmacy location:  [address]   Date of last appointment:  8/15/24  Date of next appointment:  24  Best number to reach patient: [phone]

## 2024-10-24 ENCOUNTER — OFFICE VISIT (OUTPATIENT)
Dept: PRIMARY CARE | Facility: CLINIC | Age: 79
End: 2024-10-24
Payer: MEDICARE

## 2024-10-24 VITALS
RESPIRATION RATE: 16 BRPM | OXYGEN SATURATION: 96 % | BODY MASS INDEX: 33.37 KG/M2 | HEIGHT: 66 IN | HEART RATE: 62 BPM | DIASTOLIC BLOOD PRESSURE: 69 MMHG | TEMPERATURE: 97.2 F | WEIGHT: 207.6 LBS | SYSTOLIC BLOOD PRESSURE: 129 MMHG

## 2024-10-24 DIAGNOSIS — I10 BENIGN ESSENTIAL HYPERTENSION: ICD-10-CM

## 2024-10-24 DIAGNOSIS — H81.10 BENIGN PAROXYSMAL POSITIONAL VERTIGO, UNSPECIFIED LATERALITY: Primary | ICD-10-CM

## 2024-10-24 PROCEDURE — 1160F RVW MEDS BY RX/DR IN RCRD: CPT | Performed by: FAMILY MEDICINE

## 2024-10-24 PROCEDURE — 1036F TOBACCO NON-USER: CPT | Performed by: FAMILY MEDICINE

## 2024-10-24 PROCEDURE — 1124F ACP DISCUSS-NO DSCNMKR DOCD: CPT | Performed by: FAMILY MEDICINE

## 2024-10-24 PROCEDURE — 3074F SYST BP LT 130 MM HG: CPT | Performed by: FAMILY MEDICINE

## 2024-10-24 PROCEDURE — 99213 OFFICE O/P EST LOW 20 MIN: CPT | Performed by: FAMILY MEDICINE

## 2024-10-24 PROCEDURE — 1159F MED LIST DOCD IN RCRD: CPT | Performed by: FAMILY MEDICINE

## 2024-10-24 PROCEDURE — 3078F DIAST BP <80 MM HG: CPT | Performed by: FAMILY MEDICINE

## 2024-10-24 ASSESSMENT — ENCOUNTER SYMPTOMS
NEUROLOGICAL NEGATIVE: 1
DIZZINESS: 0
RESPIRATORY NEGATIVE: 1
CONSTITUTIONAL NEGATIVE: 1
CARDIOVASCULAR NEGATIVE: 1

## 2024-10-24 NOTE — ASSESSMENT & PLAN NOTE
Patient had recent flare but it is already completely resolved and he is back to baseline.  We discussed continuing to monitor he uses Antivert periodically.  He does not want referral to ENT.  He will keep regular follow-up appointment in November.

## 2024-10-24 NOTE — PROGRESS NOTES
"Subjective   Patient ID: Neftali Schmitt is a 79 y.o. male who presents for Dizziness (Dizziness start  a week ago).    Dizziness     patient today complaining about dizziness/vertigo he has a known history of this recurrent issue.  Had a flareup within the past week but he says over the past 24 to 48 hours his symptoms have completely resolved and he feels fine now.  He was going to a show on Friday of this week and thought he should come in to get checked out.  But again he feels fine now.    Review of Systems   Constitutional: Negative.    HENT: Negative.     Respiratory: Negative.     Cardiovascular: Negative.    Neurological: Negative.  Negative for dizziness.       Objective   /69 (BP Location: Right arm, Patient Position: Sitting, BP Cuff Size: Large adult)   Pulse 62   Temp 36.2 °C (97.2 °F) (Temporal)   Resp 16   Ht 1.676 m (5' 6\")   Wt 94.2 kg (207 lb 9.6 oz)   SpO2 96%   BMI 33.51 kg/m²     Physical Exam  Constitutional:       General: He is not in acute distress.     Appearance: Normal appearance. He is not ill-appearing.   HENT:      Head: Normocephalic.      Right Ear: Tympanic membrane, ear canal and external ear normal.      Left Ear: Tympanic membrane, ear canal and external ear normal.   Neck:      Vascular: No carotid bruit.   Cardiovascular:      Rate and Rhythm: Normal rate and regular rhythm.      Pulses: Normal pulses.      Heart sounds: Normal heart sounds.   Pulmonary:      Effort: Pulmonary effort is normal. No respiratory distress.      Breath sounds: Normal breath sounds.   Musculoskeletal:      Cervical back: Normal range of motion and neck supple. No tenderness.     Symptomatically is resolved.  Has Antivert on hand if needed.  Key    Follow-up appointment in November.    Assessment/Plan   Problem List Items Addressed This Visit             ICD-10-CM    Benign essential hypertension I10     Stable continue current treatment         Benign paroxysmal positional vertigo - " Primary H81.10     Patient had recent flare but it is already completely resolved and he is back to baseline.  We discussed continuing to monitor he uses Antivert periodically.  He does not want referral to ENT.  He will keep regular follow-up appointment in November.

## 2024-11-14 ENCOUNTER — LAB (OUTPATIENT)
Dept: LAB | Facility: LAB | Age: 79
End: 2024-11-14
Payer: MEDICARE

## 2024-11-14 DIAGNOSIS — E11.9 DIABETES MELLITUS TYPE 2, DIET-CONTROLLED: ICD-10-CM

## 2024-11-14 DIAGNOSIS — E78.2 MIXED HYPERLIPIDEMIA: ICD-10-CM

## 2024-11-14 DIAGNOSIS — I10 BENIGN ESSENTIAL HYPERTENSION: ICD-10-CM

## 2024-11-14 DIAGNOSIS — E55.9 VITAMIN D DEFICIENCY: ICD-10-CM

## 2024-11-14 LAB
25(OH)D3 SERPL-MCNC: 35 NG/ML (ref 30–100)
ALBUMIN SERPL BCP-MCNC: 4.3 G/DL (ref 3.4–5)
ALP SERPL-CCNC: 65 U/L (ref 33–136)
ALT SERPL W P-5'-P-CCNC: 33 U/L (ref 10–52)
ANION GAP SERPL CALC-SCNC: 13 MMOL/L (ref 10–20)
AST SERPL W P-5'-P-CCNC: 23 U/L (ref 9–39)
BILIRUB SERPL-MCNC: 1 MG/DL (ref 0–1.2)
BUN SERPL-MCNC: 18 MG/DL (ref 6–23)
CALCIUM SERPL-MCNC: 8.8 MG/DL (ref 8.6–10.3)
CHLORIDE SERPL-SCNC: 101 MMOL/L (ref 98–107)
CHOLEST SERPL-MCNC: 119 MG/DL (ref 0–199)
CHOLESTEROL/HDL RATIO: 2.8
CO2 SERPL-SCNC: 30 MMOL/L (ref 21–32)
CREAT SERPL-MCNC: 0.89 MG/DL (ref 0.5–1.3)
EGFRCR SERPLBLD CKD-EPI 2021: 86 ML/MIN/1.73M*2
EST. AVERAGE GLUCOSE BLD GHB EST-MCNC: 137 MG/DL
GLUCOSE SERPL-MCNC: 98 MG/DL (ref 74–99)
HBA1C MFR BLD: 6.4 %
HDLC SERPL-MCNC: 42.5 MG/DL
LDLC SERPL CALC-MCNC: 57 MG/DL
NON HDL CHOLESTEROL: 77 MG/DL (ref 0–149)
POTASSIUM SERPL-SCNC: 3.7 MMOL/L (ref 3.5–5.3)
PROT SERPL-MCNC: 6.4 G/DL (ref 6.4–8.2)
SODIUM SERPL-SCNC: 140 MMOL/L (ref 136–145)
TRIGL SERPL-MCNC: 97 MG/DL (ref 0–149)
VLDL: 19 MG/DL (ref 0–40)

## 2024-11-14 PROCEDURE — 82306 VITAMIN D 25 HYDROXY: CPT

## 2024-11-14 PROCEDURE — 80061 LIPID PANEL: CPT

## 2024-11-14 PROCEDURE — 80053 COMPREHEN METABOLIC PANEL: CPT

## 2024-11-14 PROCEDURE — 83036 HEMOGLOBIN GLYCOSYLATED A1C: CPT

## 2024-11-14 PROCEDURE — 36415 COLL VENOUS BLD VENIPUNCTURE: CPT

## 2024-11-21 ENCOUNTER — APPOINTMENT (OUTPATIENT)
Dept: PRIMARY CARE | Facility: CLINIC | Age: 79
End: 2024-11-21
Payer: MEDICARE

## 2024-11-21 VITALS
HEART RATE: 83 BPM | TEMPERATURE: 96.8 F | SYSTOLIC BLOOD PRESSURE: 135 MMHG | DIASTOLIC BLOOD PRESSURE: 86 MMHG | WEIGHT: 209 LBS | OXYGEN SATURATION: 97 % | BODY MASS INDEX: 33.73 KG/M2 | RESPIRATION RATE: 14 BRPM

## 2024-11-21 DIAGNOSIS — H81.10 BENIGN PAROXYSMAL POSITIONAL VERTIGO, UNSPECIFIED LATERALITY: ICD-10-CM

## 2024-11-21 DIAGNOSIS — I10 BENIGN ESSENTIAL HYPERTENSION: ICD-10-CM

## 2024-11-21 DIAGNOSIS — R42 DIZZINESS: ICD-10-CM

## 2024-11-21 DIAGNOSIS — E78.2 MIXED HYPERLIPIDEMIA: ICD-10-CM

## 2024-11-21 DIAGNOSIS — E78.49 OTHER HYPERLIPIDEMIA: ICD-10-CM

## 2024-11-21 DIAGNOSIS — E55.9 VITAMIN D DEFICIENCY: ICD-10-CM

## 2024-11-21 DIAGNOSIS — Z13.29 THYROID DISORDER SCREEN: ICD-10-CM

## 2024-11-21 DIAGNOSIS — E11.9 DIABETES MELLITUS TYPE 2, DIET-CONTROLLED: Primary | ICD-10-CM

## 2024-11-21 DIAGNOSIS — Z12.5 SCREENING FOR PROSTATE CANCER: ICD-10-CM

## 2024-11-21 PROBLEM — S61.211A LACERATION OF LEFT INDEX FINGER WITHOUT FOREIGN BODY WITHOUT DAMAGE TO NAIL: Status: RESOLVED | Noted: 2024-08-15 | Resolved: 2024-11-21

## 2024-11-21 PROCEDURE — 1036F TOBACCO NON-USER: CPT | Performed by: FAMILY MEDICINE

## 2024-11-21 PROCEDURE — 3075F SYST BP GE 130 - 139MM HG: CPT | Performed by: FAMILY MEDICINE

## 2024-11-21 PROCEDURE — G2211 COMPLEX E/M VISIT ADD ON: HCPCS | Performed by: FAMILY MEDICINE

## 2024-11-21 PROCEDURE — 99214 OFFICE O/P EST MOD 30 MIN: CPT | Performed by: FAMILY MEDICINE

## 2024-11-21 PROCEDURE — 1159F MED LIST DOCD IN RCRD: CPT | Performed by: FAMILY MEDICINE

## 2024-11-21 PROCEDURE — 3079F DIAST BP 80-89 MM HG: CPT | Performed by: FAMILY MEDICINE

## 2024-11-21 PROCEDURE — 1160F RVW MEDS BY RX/DR IN RCRD: CPT | Performed by: FAMILY MEDICINE

## 2024-11-21 PROCEDURE — 1123F ACP DISCUSS/DSCN MKR DOCD: CPT | Performed by: FAMILY MEDICINE

## 2024-11-21 RX ORDER — ATORVASTATIN CALCIUM 80 MG/1
80 TABLET, FILM COATED ORAL DAILY
Qty: 90 TABLET | Refills: 1 | Status: SHIPPED | OUTPATIENT
Start: 2024-11-21

## 2024-11-21 RX ORDER — METOPROLOL SUCCINATE 25 MG/1
25 TABLET, EXTENDED RELEASE ORAL DAILY
Qty: 90 TABLET | Refills: 1 | Status: SHIPPED | OUTPATIENT
Start: 2024-11-21 | End: 2025-11-21

## 2024-11-21 RX ORDER — AMLODIPINE BESYLATE 10 MG/1
10 TABLET ORAL DAILY
Qty: 90 TABLET | Refills: 1 | Status: SHIPPED | OUTPATIENT
Start: 2024-11-21 | End: 2025-11-21

## 2024-11-21 RX ORDER — MECLIZINE HYDROCHLORIDE 25 MG/1
25 TABLET ORAL 3 TIMES DAILY PRN
Qty: 30 TABLET | Refills: 0 | Status: SHIPPED | OUTPATIENT
Start: 2024-11-21

## 2024-11-21 RX ORDER — CHLORTHALIDONE 25 MG/1
25 TABLET ORAL DAILY
Qty: 90 TABLET | Refills: 1 | Status: SHIPPED | OUTPATIENT
Start: 2024-11-21 | End: 2025-11-21

## 2024-11-21 RX ORDER — EZETIMIBE 10 MG/1
10 TABLET ORAL DAILY
Qty: 90 TABLET | Refills: 1 | Status: SHIPPED | OUTPATIENT
Start: 2024-11-21 | End: 2025-11-21

## 2024-11-21 ASSESSMENT — ENCOUNTER SYMPTOMS
PALPITATIONS: 0
CHEST TIGHTNESS: 0
ABDOMINAL PAIN: 0
CONFUSION: 0
ARTHRALGIAS: 0
FEVER: 0
SHORTNESS OF BREATH: 0
CHILLS: 0

## 2024-11-21 NOTE — PROGRESS NOTES
Subjective   Patient ID: Neftali Schmitt is a 79 y.o. male who presents for Follow-up (4 month).    HPI patient today for follow-up of ongoing healthcare issues and review of blood work overall doing okay.  He needs several of his medicines refilled.  He continues to refuse immunizations.    Review of Systems   Constitutional:  Negative for chills and fever.   HENT:  Negative for congestion and ear pain.    Eyes:  Negative for visual disturbance.   Respiratory:  Negative for chest tightness and shortness of breath.    Cardiovascular:  Negative for chest pain and palpitations.   Gastrointestinal:  Negative for abdominal pain.   Musculoskeletal:  Negative for arthralgias.   Skin:  Negative for pallor.   Psychiatric/Behavioral:  Negative for confusion.        Objective   /86   Pulse 83   Temp 36 °C (96.8 °F)   Resp 14   Wt 94.8 kg (209 lb)   SpO2 97%   BMI 33.73 kg/m²     Physical Exam  Vitals and nursing note reviewed.   Constitutional:       General: He is not in acute distress.     Appearance: Normal appearance. He is not ill-appearing.   HENT:      Head: Normocephalic and atraumatic.      Right Ear: Tympanic membrane, ear canal and external ear normal.      Left Ear: Tympanic membrane, ear canal and external ear normal.      Mouth/Throat:      Pharynx: Oropharynx is clear.   Eyes:      Extraocular Movements: Extraocular movements intact.   Cardiovascular:      Rate and Rhythm: Normal rate and regular rhythm.      Pulses: Normal pulses.      Heart sounds: Normal heart sounds.   Pulmonary:      Effort: Pulmonary effort is normal.      Breath sounds: Normal breath sounds.   Abdominal:      General: Abdomen is flat. Bowel sounds are normal.      Palpations: Abdomen is soft.      Tenderness: There is no abdominal tenderness.   Musculoskeletal:         General: Normal range of motion.      Cervical back: Neck supple.   Skin:     General: Skin is warm.   Neurological:      Mental Status: He is alert and  oriented to person, place, and time. Mental status is at baseline.   Psychiatric:         Mood and Affect: Mood normal.       Recent Results (from the past 6 weeks)   Comprehensive Metabolic Panel    Collection Time: 11/14/24  7:10 AM   Result Value Ref Range    Glucose 98 74 - 99 mg/dL    Sodium 140 136 - 145 mmol/L    Potassium 3.7 3.5 - 5.3 mmol/L    Chloride 101 98 - 107 mmol/L    Bicarbonate 30 21 - 32 mmol/L    Anion Gap 13 10 - 20 mmol/L    Urea Nitrogen 18 6 - 23 mg/dL    Creatinine 0.89 0.50 - 1.30 mg/dL    eGFR 86 >60 mL/min/1.73m*2    Calcium 8.8 8.6 - 10.3 mg/dL    Albumin 4.3 3.4 - 5.0 g/dL    Alkaline Phosphatase 65 33 - 136 U/L    Total Protein 6.4 6.4 - 8.2 g/dL    AST 23 9 - 39 U/L    Bilirubin, Total 1.0 0.0 - 1.2 mg/dL    ALT 33 10 - 52 U/L   Hemoglobin A1C    Collection Time: 11/14/24  7:10 AM   Result Value Ref Range    Hemoglobin A1C 6.4 (H) See comment %    Estimated Average Glucose 137 Not Established mg/dL   Lipid Panel    Collection Time: 11/14/24  7:10 AM   Result Value Ref Range    Cholesterol 119 0 - 199 mg/dL    HDL-Cholesterol 42.5 mg/dL    Cholesterol/HDL Ratio 2.8     LDL Calculated 57 <=99 mg/dL    VLDL 19 0 - 40 mg/dL    Triglycerides 97 0 - 149 mg/dL    Non HDL Cholesterol 77 0 - 149 mg/dL   Vitamin D 25-Hydroxy,Total (for eval of Vitamin D levels)    Collection Time: 11/14/24  7:10 AM   Result Value Ref Range    Vitamin D, 25-Hydroxy, Total 35 30 - 100 ng/mL     Recent labs reviewed overall numbers are stable for patient will continue current medications.  refills provided.    Return to our office 4 months with repeat fasting labs    Assessment/Plan   Problem List Items Addressed This Visit             ICD-10-CM    Benign essential hypertension I10     Stable continue current medication         Relevant Medications    amLODIPine (Norvasc) 10 mg tablet    chlorthalidone (Hygroton) 25 mg tablet    ezetimibe (Zetia) 10 mg tablet    metoprolol succinate XL (Toprol-XL) 25 mg 24 hr  tablet    Other Relevant Orders    CBC    Hemoglobin A1C    Follow Up In Primary Care - Established    Benign paroxysmal positional vertigo H81.10     Refill meclizine         Hyperlipidemia E78.5     LDL to goal continue atorvastatin 80 mg daily and Zetia 10 mg daily         Relevant Medications    atorvastatin (Lipitor) 80 mg tablet    Other Relevant Orders    Hemoglobin A1C    Lipid Panel    Follow Up In Primary Care - Established    Vitamin D deficiency E55.9     Vitamin D holding in normal range continue supplementation 5000 units of vitamin D3 daily         Relevant Orders    Vitamin D 25-Hydroxy,Total (for eval of Vitamin D levels)    Screening for prostate cancer Z12.5     Check screening PSA next lab draw         Relevant Orders    Prostate Specific Antigen, Screen    Thyroid disorder screen Z13.29     TSH with reflex         Relevant Orders    TSH with reflex to Free T4 if abnormal    Diabetes mellitus type 2, diet-controlled - Primary E11.9     A1c 6.4% continue dietary modifications         Relevant Orders    Albumin-Creatinine Ratio, Urine Random    Comprehensive Metabolic Panel    Hemoglobin A1C    Follow Up In Primary Care - Established     Other Visit Diagnoses         Codes    Dizziness     R42    Relevant Medications    meclizine (Antivert) 25 mg tablet

## 2024-12-18 PROBLEM — E66.9 OBESITY (BMI 30-39.9): Status: ACTIVE | Noted: 2024-12-18

## 2024-12-18 NOTE — PROGRESS NOTES
[facility]    Patient Name: Neftali Schmitt  Patient : 1945      Scribe Attestation  By signing my name below, I, Buzz Shearer   attest that this documentation has been prepared under the direction and in the presence of James Stewart DO.      Reason for visit:  This is an 79-year-old male here for follow-up regarding his history of coronary artery disease with prior bypass done in [place] [facility] in , carotid/vertebral artery disease followed by Vascular Surgery, hypertension, dyslipidemia, and obesity.    HPI:  This is an 79-year-old male here for follow-up regarding his history of coronary artery disease with prior bypass done in [place] back in , carotid/vertebral artery disease followed by Vascular Surgery, hypertension, dyslipidemia, and obesity.  The patient was last evaluated by me in 2024.  At that visit I ordered blood work including BMP/magnesium be drawn in 6 months and asked the patient to follow-up in 6 months and sooner if necessary.  CMP done in 2024 showed normal serum sodium and potassium with a serum creatinine of 0.89, normal ALT/AST, hemoglobin A1c of 6.4%.  Lipid panel done 2024 showed an LDL cholesterol 57 and triglycerides of 97 while on atorvastatin 80 mg daily and Zetia 10 mg daily. ECG done today showed sinus bradycardia with a heart rate of 57 bpm, and LVH.  The patient reports occasional dizziness which he believes is related to his vertigo that is being managed by his PCP. He denies any new chest pain, shortness of breath or palpitations. He states that he takes all of his medications as prescribed. During my exam, he was resting comfortably on the exam table.            Assessment/Plan:   1. Coronary artery disease  The patient has a reported history of coronary artery disease with prior bypass surgery done in [place] back in .  ECG done today showed sinus bradycardia with a heart rate of 57 bpm,  and LVH.    He denies anginal chest discomfort.  Blood pressure appears controlled on exam today.  He should continue his current antihypertensive medications and antiplatelet therapy.  Echocardiogram done in November 2021 showed normal left ventricular systolic function with an ejection fraction of 60 to 65%, normal right ventricular systolic function, mild mitral valve regurgitation, and mildly dilated ascending aorta measured at 3.9 cm.  Recent lab works as noted in the HPI.  Lipid panel done 11/14/2024 showed an LDL cholesterol 57 and triglycerides of 97 while on atorvastatin 80 mg daily and Zetia 10 mg daily.   Lab works as noted below will be done in 6 months prior to his next visit.   Please see lifestyle recommendations below.  Follow up in 6 months and sooner if necessary.      2. Carotid/Vertebral artery disease  The patient has carotid/vertebral artery disease followed by Vascular Surgery.  CTA of the neck done in February 2023 showed complex fibrocalcific plaque noted in the left carotid bifurcation/proximal internal carotid artery causing 35 to 40% narrowing, focal ectasia of the left proximal internal carotid artery of up to 9.6 mm, fibrocalcific plaque at the origins of the vertebral arteries causing moderate to severe narrowing on the right side and moderate narrowing on the left side.  Carotid ultrasound done in March 2024 showed less than 50% right internal carotid artery stenosis and 50 to 69% left internal carotid artery stenosis.  Continue risk factor modification.     3. Hypertension  The patient has a history of hypertension which appears controlled on exam today.  He should continue his current antihypertensive medications and monitor his blood pressure at home.      4. Dyslipidemia  Lipid panel done 11/14/2024 showed an LDL cholesterol 57 and triglycerides of 97 while on atorvastatin 80 mg daily and Zetia 10 mg daily.   Please see lifestyle recommendations below.      5. Obesity  Please see  lifestyle recommendations below.     6. Dizziness  The patient reports occasional dizziness which he believes is related to his vertigo that is being managed by his PCP.  ECG done today showed sinus bradycardia with a heart rate of 57 bpm, and LVH.   Blood pressure appears controlled on exam today.  Patient should follow general recommendations including staying well-hydrated, changing the positions slowly, wearing compression stockings as necessary, and routine exercise.   Patient should continue to follow up with his PCP for management of BPPV.       Orders:   BMP/magnesium in 6 months,   Follow-up in 6 months.    Lifestyle Recommendations  I recommend a whole-food plant-based diet, an eating pattern that encourages the consumption of unrefined plant foods (such as fruits, vegetables, tubers, whole grains, legumes, nuts and seeds) and discourages meats, dairy products, eggs and processed foods.     The AHA/ACC recommends that the patient consume a dietary pattern that emphasizes intake of vegetables, fruits, and whole grains; includes low-fat dairy products, poultry, fish, legumes, non-tropical vegetable oils, and nuts; and limits intake of sodium, sweets, sugar-sweetened beverages, and red meats.  Adapt this dietary pattern to appropriate calorie requirements (a 500-750 kcal/day deficit to loose weight), personal and cultural food preferences, and nutrition therapy for other medical conditions (including diabetes).  Achieve this pattern by following plans such as the [place] Mediterranean, DASH dietary pattern, or AHA diet.     Engage in 2 hours and 30 minutes per week of moderate-intensity physical activity, or 1 hour and 15 minutes (75 minutes) per week of vigorous-intensity aerobic physical activity, or an equivalent combination of moderate and vigorous-intensity aerobic physical activity. Aerobic activity should be performed in episodes of at least 10 minutes preferably spread throughout the week.     Adhering  to a heart healthy diet, regular exercise habits, avoidance of tobacco products, and maintenance of a healthy weight are crucial components of their heart disease risk reduction.     Any positive review of systems not specifically addressed in the office visit today should be evaluated and treated by the patients primary care physician or in an emergency department if necessary     Patient was notified that results from ordered tests will be called to the patient if it changes current management; it will otherwise be discussed at a future appointment and available on [facility].     Thank you for allowing me to participate in the care of this patient.        This document was generated using the assistance of voice recognition software. If there are any errors of spelling, grammar, syntax, or meaning; please feel free to contact me directly for clarification.    Past Medical History:  He has a past medical history of Abnormal auditory perception of both ears (02/09/2023), Immunization not carried out because of patient refusal, Personal history of other diseases of the circulatory system, Personal history of other diseases of the musculoskeletal system and connective tissue, and Personal history of other drug therapy.    Past Surgical History:  He has a past surgical history that includes Other surgical history (07/17/2020) and CT angio neck (2/21/2023).      Social History:  He reports that he quit smoking about 34 years ago. His smoking use included cigarettes. He has been exposed to tobacco smoke. He has never used smokeless tobacco. He reports current alcohol use of about 2.0 standard drinks of alcohol per week. He reports that he does not use drugs.    Family History:  Family History   Problem Relation Name Age of Onset    Cancer Mother      Cancer Father          Allergies:  Losartan    Outpatient Medications:  Current Outpatient Medications   Medication Instructions    amLODIPine (NORVASC) 10 mg, oral, Daily  "   aspirin 81 mg EC tablet     atorvastatin (LIPITOR) 80 mg, oral, Daily    chlorthalidone (HYGROTON) 25 mg, oral, Daily    cholecalciferol (VITAMIN D3) 5,000 Units, Daily    ezetimibe (ZETIA) 10 mg, oral, Daily    meclizine (ANTIVERT) 25 mg, oral, 3 times daily PRN    metoprolol succinate XL (TOPROL-XL) 25 mg, oral, Daily        ROS:  A 14 point review of systems was done and is negative other than as stated in HPI    Vitals:      5/7/2024     5:00 PM 6/24/2024     9:45 AM 7/18/2024     9:00 AM 8/9/2024    10:05 AM 8/15/2024     8:17 AM 10/24/2024     2:59 PM 11/21/2024     8:45 AM   Vitals   Systolic 136 134 126 152 156 129 135   Diastolic 72 68 55 77 63 69 86   BP Location     Right arm Right arm    Heart Rate 58 59 56 58 55 62 83   Temp 36.3 °C (97.3 °F)  36.3 °C (97.3 °F) 36.6 °C (97.9 °F) 36.3 °C (97.3 °F) 36.2 °C (97.2 °F) 36 °C (96.8 °F)   Resp 14  14 16 16 16 14   Height  1.676 m (5' 6\") 1.676 m (5' 6\")  1.676 m (5' 6\") 1.676 m (5' 6\")    Weight (lb) 214 211.4 206  205 207.6 209   BMI 34.54 kg/m2 34.12 kg/m2 33.25 kg/m2  33.09 kg/m2 33.51 kg/m2 33.73 kg/m2   BSA (m2) 2.13 m2 2.11 m2 2.09 m2  2.08 m2 2.09 m2 2.1 m2        Physical Exam:     Constitutional: Cooperative, in no acute distress, alert, appears stated age.  Skin: Skin color, texture, turgor normal. No rashes or lesions.  Head: Normocephalic. No masses, lesions, tenderness or abnormalities  Eyes: Extraocular movements are grossly intact.  Mouth and throat: Mucous membranes moist  Neck: Neck supple, left carotid bruit, no JVD  Respiratory: Lungs clear to auscultation, no wheezing or rhonchi, no use of accessory muscles  Chest wall: Sternal scar, normal excursion with respiration  Cardiovascular: Bradycardic, regular rhythm without murmur  Gastrointestinal: Abdomen soft, nontender. Bowel sounds normal. Obese  Musculoskeletal: Strength equal in upper extremities  Extremities: Trace pitting bilateral lower extremity edema.  Neurologic: Sensation " grossly intact, alert and oriented x3    Intake/Output:   No intake/output data recorded.    Outpatient Medications  Current Outpatient Medications on File Prior to Visit   Medication Sig Dispense Refill    amLODIPine (Norvasc) 10 mg tablet Take 1 tablet (10 mg) by mouth once daily. 90 tablet 1    aspirin 81 mg EC tablet       atorvastatin (Lipitor) 80 mg tablet Take 1 tablet (80 mg) by mouth once daily. 90 tablet 1    chlorthalidone (Hygroton) 25 mg tablet Take 1 tablet (25 mg) by mouth once daily. 90 tablet 1    cholecalciferol (Vitamin D3) 5,000 Units tablet Take 1 tablet (5,000 Units) by mouth once daily.      ezetimibe (Zetia) 10 mg tablet Take 1 tablet (10 mg) by mouth once daily. 90 tablet 1    meclizine (Antivert) 25 mg tablet Take 1 tablet (25 mg) by mouth 3 times a day as needed for dizziness. 30 tablet 0    metoprolol succinate XL (Toprol-XL) 25 mg 24 hr tablet Take 1 tablet (25 mg) by mouth once daily. 90 tablet 1     No current facility-administered medications on file prior to visit.       Labs: (past 26 weeks)  Recent Results (from the past 26 weeks)   Comprehensive Metabolic Panel    Collection Time: 07/10/24  7:15 AM   Result Value Ref Range    Glucose 114 (H) 74 - 99 mg/dL    Sodium 140 136 - 145 mmol/L    Potassium 4.9 3.5 - 5.3 mmol/L    Chloride 102 98 - 107 mmol/L    Bicarbonate 31 21 - 32 mmol/L    Anion Gap 12 10 - 20 mmol/L    Urea Nitrogen 21 6 - 23 mg/dL    Creatinine 0.88 0.50 - 1.30 mg/dL    eGFR 86 >60 mL/min/1.73m*2    Calcium 9.6 8.6 - 10.3 mg/dL    Albumin 4.5 3.4 - 5.0 g/dL    Alkaline Phosphatase 70 33 - 136 U/L    Total Protein 6.9 6.4 - 8.2 g/dL    AST 34 9 - 39 U/L    Bilirubin, Total 1.1 0.0 - 1.2 mg/dL    ALT 44 10 - 52 U/L   Hemoglobin A1C    Collection Time: 07/10/24  7:15 AM   Result Value Ref Range    Hemoglobin A1C 6.4 (H) see below %    Estimated Average Glucose 137 Not Established mg/dL   Lipid Panel    Collection Time: 07/10/24  7:15 AM   Result Value Ref Range     Cholesterol 134 0 - 199 mg/dL    HDL-Cholesterol 43.0 mg/dL    Cholesterol/HDL Ratio 3.1     LDL Calculated 69 <=99 mg/dL    VLDL 22 0 - 40 mg/dL    Triglycerides 112 0 - 149 mg/dL    Non HDL Cholesterol 91 0 - 149 mg/dL   Vitamin D 25-Hydroxy,Total (for eval of Vitamin D levels)    Collection Time: 07/10/24  7:15 AM   Result Value Ref Range    Vitamin D, 25-Hydroxy, Total 39 30 - 100 ng/mL   Comprehensive Metabolic Panel    Collection Time: 11/14/24  7:10 AM   Result Value Ref Range    Glucose 98 74 - 99 mg/dL    Sodium 140 136 - 145 mmol/L    Potassium 3.7 3.5 - 5.3 mmol/L    Chloride 101 98 - 107 mmol/L    Bicarbonate 30 21 - 32 mmol/L    Anion Gap 13 10 - 20 mmol/L    Urea Nitrogen 18 6 - 23 mg/dL    Creatinine 0.89 0.50 - 1.30 mg/dL    eGFR 86 >60 mL/min/1.73m*2    Calcium 8.8 8.6 - 10.3 mg/dL    Albumin 4.3 3.4 - 5.0 g/dL    Alkaline Phosphatase 65 33 - 136 U/L    Total Protein 6.4 6.4 - 8.2 g/dL    AST 23 9 - 39 U/L    Bilirubin, Total 1.0 0.0 - 1.2 mg/dL    ALT 33 10 - 52 U/L   Hemoglobin A1C    Collection Time: 11/14/24  7:10 AM   Result Value Ref Range    Hemoglobin A1C 6.4 (H) See comment %    Estimated Average Glucose 137 Not Established mg/dL   Lipid Panel    Collection Time: 11/14/24  7:10 AM   Result Value Ref Range    Cholesterol 119 0 - 199 mg/dL    HDL-Cholesterol 42.5 mg/dL    Cholesterol/HDL Ratio 2.8     LDL Calculated 57 <=99 mg/dL    VLDL 19 0 - 40 mg/dL    Triglycerides 97 0 - 149 mg/dL    Non HDL Cholesterol 77 0 - 149 mg/dL   Vitamin D 25-Hydroxy,Total (for eval of Vitamin D levels)    Collection Time: 11/14/24  7:10 AM   Result Value Ref Range    Vitamin D, 25-Hydroxy, Total 35 30 - 100 ng/mL       ECG  Encounter Date: 06/24/24   ECG 12 Lead    Narrative    Sinus bradycardia, nonspecific ST abnormality, heart rate 59 bpm.       Echocardiogram  No results found for this or any previous visit from the past 1095 days.      CV Studies:  EKG:  Encounter Date: 06/24/24   ECG 12 Lead    Narrative     Sinus bradycardia, nonspecific ST abnormality, heart rate 59 bpm.     Echocardiogram:   Echocardiogram     [place] [id number]  Phone [phone] Fax [phone]    TRANSTHORACIC ECHOCARDIOGRAM REPORT      Patient Name:     ANEUDY MADRIGAL         Nat Physician:  [id number] Nestor MCCORMICK MD  Study Date:       11/8/2021        Referring           [id number] KRISTA MCCLAIN  Physician:  MRN/PID:          [id number]         PCP:  Accession/Order#: [id number]     [facility] Echo Lab  Location:  YOB: 1945        Fellow:  Gender:           M                Nurse:  Admit Date:                        Sonographer:        Veronica Linder RDCS  Admission Status: Outpatient       Additional Staff:  Height:           172.72 cm        CC Report to:  Weight:           95.26 kg         Study Type:         Echocardiogram  BSA:              2.09 m2  Blood Pressure: 120 /70 mmHg    Diagnosis/ICD: R94.31-Abnormal electrocardiogram [ECG] [EKG]; I10-Essential  (primary) hypertension; I25.10-Atherosclerotic heart disease of  native coronary artery without angina pectoris  Indication:    CABG-2008  Procedure/CPT: Echo Complete w Full [id number]  Study Detail: The following Echo studies were performed: 2D, M-Mode, Doppler and  color flow.      PHYSICIAN INTERPRETATION:  Left Ventricle: The left ventricular systolic function is normal, with an estimated ejection fraction of 60-65%. There are no regional wall motion abnormalities. The left ventricular cavity size is normal. Spectral Doppler shows a normal pattern of left ventricular diastolic filling.  Left Atrium: The left atrium is mildly dilated.  Right Ventricle: The right ventricle is normal in size. There is normal right ventricular global systolic function.  Right Atrium: The right atrium is normal in size.  Aortic Valve: The aortic valve is trileaflet.  There is no evidence of aortic valve regurgitation.  Mitral Valve: The mitral valve is normal in structure. There is trace mitral valve regurgitation.  Tricuspid Valve: The tricuspid valve is structurally normal. There is mild tricuspid regurgitation.  Pulmonic Valve: The pulmonic valve is structurally normal. There is physiologic pulmonic valve regurgitation.  Pericardium: There is no pericardial effusion noted.  Aorta: The aortic root is abnormal. There is mild dilatation of the ascending aorta.      CONCLUSIONS:  1. The left ventricular systolic function is normal with a 60-65% estimated ejection fraction.    QUANTITATIVE DATA SUMMARY:  2D MEASUREMENTS:  Normal Ranges:  IVSd:          1.00 cm   (0.6-1.1cm)  LVPWd:         1.10 cm   (0.6-1.1cm)  LVIDd:         5.10 cm   (3.9-5.9cm)  LVIDs:         3.50 cm  LV Mass Index: 96.2 g/m2  LV % FS        31.4 %    LA VOLUME:  Normal Ranges:  LA Vol A4C:        62.1 ml    (22+/-6mL/m2)  LA Vol A2C:        68.4 ml  LA Vol BP:         69.0 ml  LA Vol Index A4C:  29.8ml/m2  LA Vol Index A2C:  32.8 ml/m2  LA Vol Index BP:   33.1 ml/m2  LA Area A4C:       21.8 cm2  LA Area A2C:       21.6 cm2  LA Major Axis A4C: 6.5 cm  LA Major Axis A2C: 5.8 cm  LA Volume Index:   32.9 ml/m2    RA VOLUME BY A/L METHOD:  Normal Ranges:  RA Area A4C: 12.5 cm2    M-MODE MEASUREMENTS:  Normal Ranges:  Ao Root: 4.00 cm (2.0-3.7cm)  AoV Exc: 2.00 cm (1.5-2.5cm)  LAs:     3.70 cm (2.7-4.0cm)    AORTA MEASUREMENTS:  Normal Ranges:  AoV Exc:     2.00 cm (1.5-2.5cm)  Ao Sinus, d: 3.38 cm (2.1-3.5cm)  Ao STJ, d:   2.90 cm (1.7-3.4cm)  Asc Ao, d:   3.90 cm (2.1-3.4cm)    LV SYSTOLIC FUNCTION BY 2D PLANIMETRY (MOD):  Normal Ranges:  EF-A4C View: 68.9 % (>55%)  EF-A2C View: 56.9 %  EF-Biplane:  66.1 %    LV DIASTOLIC FUNCTION:  Normal Ranges:  MV Peak E:        0.82 m/s    (0.7-1.2 m/s)  MV Peak A:        0.62 m/s    (0.42-0.7 m/s)  E/A Ratio:        1.32        (1.0-2.2)  MV e'             0.12 m/s     (>8.0)  MV lateral e'     0.11 m/s  MV medial e'      0.12 m/s  MV A Dur:         100.00 msec  E/e' Ratio:       7.12        (<8.0)  LV IVRT:          82 msec     (<100ms)  PulmV A Revs Dur: 127.00 msec    MITRAL VALVE:  Normal Ranges:  MV DT: 201 msec (150-240msec)    AORTIC VALVE:  Normal Ranges:  LVOT Max Bernard:  0.94 m/s (<1.1m/s)  LVOT VTI:      24.20 cm  LVOT Diameter: 2.10 cm  (1.8-2.4cm)    RIGHT VENTRICLE:  RV 1   3.6 cm  RV 2   2.3 cm  RV 3   7.4 cm  TAPSE: 25.0 mm  RV s'  0.18 m/s    TRICUSPID VALVE/RVSP:  Normal Ranges:  Peak TR Velocity: 2.59 m/s  RV Syst Pressure: 29.8 mmHg (< 30mmHg)  TV E Vmax:        0.48 m/s  (0.3-0.7m/s)  TV A Vmax:        0.47 m/s  IVC Diam:         0.90 cm    PULMONIC VALVE:  Normal Ranges:  PIEDV: 1.34 m/s  PADP:  10.2 mmHg    Pulmonary Veins:  PulmV A Revs Dur: 127.00 msec      [id number] Nestor Humphrey MD  Electronically signed on 11/9/2021 at 3:05:25 PM         Final     Stress Testing IMGRESULT([id number]:1:1825): No results found for this or any previous visit from the past 1825 days.    Cardiac Catheterization: No results found for this or any previous visit from the past 1825 days.  No results found for this or any previous visit from the past 3650 days.     Cardiac Scoring: No results found for this or any previous visit from the past 1825 days.    AAA : No results found for this or any previous visit from the past 1825 days.    OTHER: No results found for this or any previous visit from the past 1825 days.    LAST IMAGING RESULTS  Urgent Care Xray  Narrative: Interpreted By:  Piero Lopes,   STUDY:  XR URGENT CARE XRAY; 8/9/2024 10:30 am      INDICATION:  Signs/Symptoms:L 2nd finger injury.      COMPARISON:  None.      ACCESSION NUMBER(S):  [id number]      ORDERING CLINICIAN:  KARTHIK LOWE      TECHNIQUE:  Left index finger three views      FINDINGS:  No fractures or destructive lesions are identified.      Impression: No acute pathologic findings are identified.       MACRO:  none      Signed by: Piero Lopes 8/9/2024 11:00 AM  Dictation workstation:   [id number]    Problem List Items Addressed This Visit       Benign essential hypertension    CAD (coronary artery disease) - Primary    Overview     Previous bypass in [facility] 2007  Patient follows with cardiology         Hyperlipidemia    Bilateral carotid artery disease (CMS-[facility])    Overview     Bilateral carotid ultrasound 1/24/2023  CT angio of neck 2/21/2023  Patient follows with vascular surgery/Dr. Inman  Carotid ultrasound 2/2024         Vertebral artery disease (CMS-[facility])    Obesity (BMI 30-39.9)        James Stewart DO, FACC, FACOI

## 2024-12-23 ENCOUNTER — APPOINTMENT (OUTPATIENT)
Dept: CARDIOLOGY | Facility: CLINIC | Age: 79
End: 2024-12-23
Payer: MEDICARE

## 2024-12-23 VITALS
HEART RATE: 57 BPM | SYSTOLIC BLOOD PRESSURE: 130 MMHG | BODY MASS INDEX: 32.62 KG/M2 | HEIGHT: 66 IN | DIASTOLIC BLOOD PRESSURE: 60 MMHG | WEIGHT: 203 LBS

## 2024-12-23 DIAGNOSIS — I65.23 BILATERAL CAROTID ARTERY STENOSIS: ICD-10-CM

## 2024-12-23 DIAGNOSIS — E78.00 PURE HYPERCHOLESTEROLEMIA: ICD-10-CM

## 2024-12-23 DIAGNOSIS — I10 BENIGN ESSENTIAL HYPERTENSION: ICD-10-CM

## 2024-12-23 DIAGNOSIS — I25.10 CORONARY ARTERY DISEASE INVOLVING NATIVE CORONARY ARTERY OF NATIVE HEART WITHOUT ANGINA PECTORIS: Primary | ICD-10-CM

## 2024-12-23 DIAGNOSIS — I77.9 VERTEBRAL ARTERY DISEASE (CMS-HCC): ICD-10-CM

## 2024-12-23 DIAGNOSIS — E78.2 MIXED HYPERLIPIDEMIA: ICD-10-CM

## 2024-12-23 DIAGNOSIS — E66.9 OBESITY (BMI 30-39.9): ICD-10-CM

## 2024-12-23 LAB
ATRIAL RATE: 57 BPM
P AXIS: 14 DEGREES
P OFFSET: 207 MS
P ONSET: 140 MS
PR INTERVAL: 168 MS
Q ONSET: 224 MS
QRS COUNT: 10 BEATS
QRS DURATION: 82 MS
QT INTERVAL: 430 MS
QTC CALCULATION(BAZETT): 418 MS
QTC FREDERICIA: 422 MS
R AXIS: 1 DEGREES
T AXIS: 19 DEGREES
T OFFSET: 439 MS
VENTRICULAR RATE: 57 BPM

## 2024-12-23 PROCEDURE — 1159F MED LIST DOCD IN RCRD: CPT | Performed by: INTERNAL MEDICINE

## 2024-12-23 PROCEDURE — 93010 ELECTROCARDIOGRAM REPORT: CPT | Performed by: INTERNAL MEDICINE

## 2024-12-23 PROCEDURE — 99214 OFFICE O/P EST MOD 30 MIN: CPT | Performed by: INTERNAL MEDICINE

## 2024-12-23 PROCEDURE — 3075F SYST BP GE 130 - 139MM HG: CPT | Performed by: INTERNAL MEDICINE

## 2024-12-23 PROCEDURE — 1123F ACP DISCUSS/DSCN MKR DOCD: CPT | Performed by: INTERNAL MEDICINE

## 2024-12-23 PROCEDURE — 1036F TOBACCO NON-USER: CPT | Performed by: INTERNAL MEDICINE

## 2024-12-23 PROCEDURE — 3078F DIAST BP <80 MM HG: CPT | Performed by: INTERNAL MEDICINE

## 2024-12-23 PROCEDURE — 99214 OFFICE O/P EST MOD 30 MIN: CPT | Mod: 25 | Performed by: INTERNAL MEDICINE

## 2024-12-23 PROCEDURE — 93005 ELECTROCARDIOGRAM TRACING: CPT | Performed by: INTERNAL MEDICINE

## 2025-03-04 ENCOUNTER — APPOINTMENT (OUTPATIENT)
Dept: VASCULAR SURGERY | Facility: CLINIC | Age: 80
End: 2025-03-04
Payer: MEDICARE

## 2025-03-04 VITALS
BODY MASS INDEX: 33.89 KG/M2 | DIASTOLIC BLOOD PRESSURE: 74 MMHG | WEIGHT: 210 LBS | SYSTOLIC BLOOD PRESSURE: 137 MMHG | HEART RATE: 70 BPM

## 2025-03-04 DIAGNOSIS — R09.89 CAROTID BRUIT, UNSPECIFIED LATERALITY: Primary | ICD-10-CM

## 2025-03-04 PROCEDURE — 1036F TOBACCO NON-USER: CPT | Performed by: SURGERY

## 2025-03-04 PROCEDURE — 1123F ACP DISCUSS/DSCN MKR DOCD: CPT | Performed by: SURGERY

## 2025-03-04 PROCEDURE — 3075F SYST BP GE 130 - 139MM HG: CPT | Performed by: SURGERY

## 2025-03-04 PROCEDURE — 1159F MED LIST DOCD IN RCRD: CPT | Performed by: SURGERY

## 2025-03-04 PROCEDURE — 3078F DIAST BP <80 MM HG: CPT | Performed by: SURGERY

## 2025-03-04 PROCEDURE — 99213 OFFICE O/P EST LOW 20 MIN: CPT | Performed by: SURGERY

## 2025-03-04 NOTE — PROGRESS NOTES
Subjective   Patient ID: Neftali Schmitt is a 79 y.o. male who presents for Follow-up (1 yr Carotid ).  HPI  Patient is overall doing well  No evidence of amaurosis hemiplegia or hemiparesis  No recent chest pain or shortness of breath no TIA or stroke noted  No evidence of lower extremity symptoms    Review of Systems  Review of Systems    Constitutional:  no generalized malaise overall feels well, energy levels intact, no complaints specifically noted  HEENT:  No blurry vision, no visual aides noted, no hearing loss no ear ache no nose bleeds noted, no dysphagia, no congestion otherwise no pertinent positives noted  Cardiovascular:  no palpitations, chest pain or heaviness noted, no leg swelling, no numbness or tingling in the lower extremity noted  Respiratory:  no shortness of breath, no productive cough noted, no conversation dyspnea or difficulty breathing  Gastrointestinal:  no abdominal pain, no nausea or vomiting, appetite intact, no bowel irregularities noted  Genitourinary:   no urinary incontinence, frequency or urgency issues noted, no hematuria or burning sensation issues  Musculoskeletal:  No muscle aches or pains, no joint discomfort noted, no back pain noted otherwise feels well  Skin: no ulcerations, skin color issues or wounds upper or lower extremities  Neurologic: no dizziness, no hemiplegia, no hemiparesis, no obvious visual deficits noted  Psychiatric: no depression, no memory loss noted, no suicidal ideation  Endocrine: no weight loss or gain, no temperature concerns hot or cold intolerance  Hemogolotic/Lymphatic: no bruising, excessive bleeding, no swelling in the groins or neck noted  s  Objective   Physical Exam  Physical exam    Constitutional: alert and in no acute distress verbal  Eyes: No erythema swelling or discharge noted  Neck: supple, symmetric, trachea midline, no masses noted  Cardiovascular: Carotid pulses 2+, no obvious bruit, no Jugular distension noted, no thrill, heart  regular rate, lower extremity vascular exam intact, cap refill <2 sec  Pulmonary:  Bilateral breath sounds intact, clear with rales rhonchi or wheeze  Abdomen: soft non tender, no pulsatile masses noted, no rebound rigidity or guarding noted  Skin: intact warm no abnormal turgor  Psychiatric: alert without any obvious cognitive issues, oriented to person, place, and time    Assessment/Plan   Bilateral carotid stenosis  Last carotid duplex done 1 year ago demonstrated 50 to 69% left carotid stenosis  Minimal disease right ICA  Repeat carotid duplex continue baby aspirin  Follow-up 1 year         Cuco Inman DO 03/04/25 2:42 PM

## 2025-03-19 LAB
25(OH)D3+25(OH)D2 SERPL-MCNC: 44 NG/ML (ref 30–100)
ALBUMIN SERPL-MCNC: 4.6 G/DL (ref 3.6–5.1)
ALP SERPL-CCNC: 69 U/L (ref 35–144)
ALT SERPL-CCNC: 26 U/L (ref 9–46)
ANION GAP SERPL CALCULATED.4IONS-SCNC: 11 MMOL/L (CALC) (ref 7–17)
AST SERPL-CCNC: 24 U/L (ref 10–35)
BILIRUB SERPL-MCNC: 0.9 MG/DL (ref 0.2–1.2)
BUN SERPL-MCNC: 16 MG/DL (ref 7–25)
CALCIUM SERPL-MCNC: 9.3 MG/DL (ref 8.6–10.3)
CHLORIDE SERPL-SCNC: 101 MMOL/L (ref 98–110)
CHOLEST SERPL-MCNC: 139 MG/DL
CHOLEST/HDLC SERPL: 2.8 (CALC)
CO2 SERPL-SCNC: 29 MMOL/L (ref 20–32)
CREAT SERPL-MCNC: 0.87 MG/DL (ref 0.7–1.22)
EGFRCR SERPLBLD CKD-EPI 2021: 87 ML/MIN/1.73M2
ERYTHROCYTE [DISTWIDTH] IN BLOOD BY AUTOMATED COUNT: 12.8 % (ref 11–15)
EST. AVERAGE GLUCOSE BLD GHB EST-MCNC: 131 MG/DL
EST. AVERAGE GLUCOSE BLD GHB EST-SCNC: 7.3 MMOL/L
GLUCOSE SERPL-MCNC: 104 MG/DL (ref 65–99)
HBA1C MFR BLD: 6.2 % OF TOTAL HGB
HCT VFR BLD AUTO: 44 % (ref 38.5–50)
HDLC SERPL-MCNC: 49 MG/DL
HGB BLD-MCNC: 14.4 G/DL (ref 13.2–17.1)
LDLC SERPL CALC-MCNC: 68 MG/DL (CALC)
MCH RBC QN AUTO: 31.1 PG (ref 27–33)
MCHC RBC AUTO-ENTMCNC: 32.7 G/DL (ref 32–36)
MCV RBC AUTO: 95 FL (ref 80–100)
NONHDLC SERPL-MCNC: 90 MG/DL (CALC)
PLATELET # BLD AUTO: 241 THOUSAND/UL (ref 140–400)
PMV BLD REES-ECKER: 12.1 FL (ref 7.5–12.5)
POTASSIUM SERPL-SCNC: 4.1 MMOL/L (ref 3.5–5.3)
PROT SERPL-MCNC: 7.1 G/DL (ref 6.1–8.1)
PSA SERPL-MCNC: 0.7 NG/ML
RBC # BLD AUTO: 4.63 MILLION/UL (ref 4.2–5.8)
SODIUM SERPL-SCNC: 141 MMOL/L (ref 135–146)
TRIGL SERPL-MCNC: 137 MG/DL
TSH SERPL-ACNC: 3.24 MIU/L (ref 0.4–4.5)
WBC # BLD AUTO: 7.7 THOUSAND/UL (ref 3.8–10.8)

## 2025-03-26 ENCOUNTER — APPOINTMENT (OUTPATIENT)
Dept: PRIMARY CARE | Facility: CLINIC | Age: 80
End: 2025-03-26
Payer: MEDICARE

## 2025-03-26 VITALS
SYSTOLIC BLOOD PRESSURE: 126 MMHG | HEART RATE: 62 BPM | WEIGHT: 210 LBS | BODY MASS INDEX: 33.89 KG/M2 | DIASTOLIC BLOOD PRESSURE: 69 MMHG | TEMPERATURE: 96.9 F | OXYGEN SATURATION: 97 %

## 2025-03-26 DIAGNOSIS — I10 BENIGN ESSENTIAL HYPERTENSION: ICD-10-CM

## 2025-03-26 DIAGNOSIS — I77.9 VERTEBRAL ARTERY DISEASE: ICD-10-CM

## 2025-03-26 DIAGNOSIS — I65.23 BILATERAL CAROTID ARTERY STENOSIS: ICD-10-CM

## 2025-03-26 DIAGNOSIS — E66.9 OBESITY (BMI 30-39.9): ICD-10-CM

## 2025-03-26 DIAGNOSIS — E11.9 DIABETES MELLITUS TYPE 2, DIET-CONTROLLED: ICD-10-CM

## 2025-03-26 DIAGNOSIS — E78.2 MIXED HYPERLIPIDEMIA: ICD-10-CM

## 2025-03-26 DIAGNOSIS — Z00.00 MEDICARE ANNUAL WELLNESS VISIT, SUBSEQUENT: Primary | ICD-10-CM

## 2025-03-26 DIAGNOSIS — D32.0 CEREBRAL MENINGIOMA (MULTI): ICD-10-CM

## 2025-03-26 PROBLEM — E66.01 SEVERE OBESITY (BMI 35.0-39.9) WITH COMORBIDITY (MULTI): Status: RESOLVED | Noted: 2023-09-26 | Resolved: 2025-03-26

## 2025-03-26 PROBLEM — R07.81 RIB PAIN ON RIGHT SIDE: Status: RESOLVED | Noted: 2024-05-07 | Resolved: 2025-03-26

## 2025-03-26 PROBLEM — Z13.29 THYROID DISORDER SCREEN: Status: RESOLVED | Noted: 2023-09-26 | Resolved: 2025-03-26

## 2025-03-26 PROCEDURE — 3074F SYST BP LT 130 MM HG: CPT | Performed by: FAMILY MEDICINE

## 2025-03-26 PROCEDURE — 1036F TOBACCO NON-USER: CPT | Performed by: FAMILY MEDICINE

## 2025-03-26 PROCEDURE — 99214 OFFICE O/P EST MOD 30 MIN: CPT | Performed by: FAMILY MEDICINE

## 2025-03-26 PROCEDURE — 1159F MED LIST DOCD IN RCRD: CPT | Performed by: FAMILY MEDICINE

## 2025-03-26 PROCEDURE — G0439 PPPS, SUBSEQ VISIT: HCPCS | Performed by: FAMILY MEDICINE

## 2025-03-26 PROCEDURE — 1170F FXNL STATUS ASSESSED: CPT | Performed by: FAMILY MEDICINE

## 2025-03-26 PROCEDURE — 3078F DIAST BP <80 MM HG: CPT | Performed by: FAMILY MEDICINE

## 2025-03-26 PROCEDURE — 1160F RVW MEDS BY RX/DR IN RCRD: CPT | Performed by: FAMILY MEDICINE

## 2025-03-26 PROCEDURE — 1123F ACP DISCUSS/DSCN MKR DOCD: CPT | Performed by: FAMILY MEDICINE

## 2025-03-26 ASSESSMENT — ENCOUNTER SYMPTOMS
CHILLS: 0
LOSS OF SENSATION IN FEET: 0
OCCASIONAL FEELINGS OF UNSTEADINESS: 0
FEVER: 0
CHEST TIGHTNESS: 0
ABDOMINAL PAIN: 0
PALPITATIONS: 0
DEPRESSION: 0
CONFUSION: 0
ARTHRALGIAS: 0
SHORTNESS OF BREATH: 0

## 2025-03-26 ASSESSMENT — PATIENT HEALTH QUESTIONNAIRE - PHQ9
2. FEELING DOWN, DEPRESSED OR HOPELESS: NOT AT ALL
1. LITTLE INTEREST OR PLEASURE IN DOING THINGS: NOT AT ALL
SUM OF ALL RESPONSES TO PHQ9 QUESTIONS 1 AND 2: 0

## 2025-03-26 ASSESSMENT — ACTIVITIES OF DAILY LIVING (ADL)
BATHING: INDEPENDENT
DOING_HOUSEWORK: INDEPENDENT
DRESSING: INDEPENDENT
GROCERY_SHOPPING: INDEPENDENT
MANAGING_FINANCES: INDEPENDENT
TAKING_MEDICATION: INDEPENDENT

## 2025-03-26 NOTE — ASSESSMENT & PLAN NOTE
Stable continue Zetia 10 mg daily and Lipitor 80 mg daily    Orders:    Follow Up In Primary Care - Established    Comprehensive Metabolic Panel; Future    Lipid Panel; Future    Follow Up In Primary Care - Established; Future

## 2025-03-26 NOTE — PROGRESS NOTES
Subjective   Reason for Visit: Neftali Schmitt is an 79 y.o. male here for a Medicare Wellness visit.     Past Medical, Surgical, and Family History reviewed and updated in chart.    Reviewed all medications by prescribing practitioner or clinical pharmacist (such as prescriptions, OTCs, herbal therapies and supplements) and documented in the medical record.    HPI today for follow-up of ongoing healthcare issues and review of blood work for most part says has been doing well.  Recently saw cardiology as well as vascular surgery for checkups for the most part he says things are stable vascular surgeon is sending him for an update on his carotid artery disease with ultrasound checkup.    Patient Care Team:  Kiran Beard MD as PCP - General  Kiran Beard MD as PCP - [employer] PCP  James Stewart DO as Cardiologist (Cardiology)     Review of Systems   Constitutional:  Negative for chills and fever.   HENT:  Negative for congestion and ear pain.    Eyes:  Negative for visual disturbance.   Respiratory:  Negative for chest tightness and shortness of breath.    Cardiovascular:  Negative for chest pain and palpitations.   Gastrointestinal:  Negative for abdominal pain.   Musculoskeletal:  Negative for arthralgias.   Skin:  Negative for pallor.   Psychiatric/Behavioral:  Negative for confusion.        Objective   Vitals:  /69   Pulse 62   Temp 36.1 °C (96.9 °F) (Temporal)   Wt 95.3 kg (210 lb)   SpO2 97%   BMI 33.89 kg/m²       Physical Exam  Vitals and nursing note reviewed.   Constitutional:       General: He is not in acute distress.     Appearance: Normal appearance. He is not ill-appearing.   HENT:      Head: Normocephalic and atraumatic.      Right Ear: Tympanic membrane, ear canal and external ear normal.      Left Ear: Tympanic membrane, ear canal and external ear normal.      Mouth/Throat:      Pharynx: Oropharynx is clear.   Eyes:      Extraocular Movements: Extraocular  movements intact.   Cardiovascular:      Rate and Rhythm: Normal rate and regular rhythm.      Pulses: Normal pulses.      Heart sounds: Normal heart sounds.   Pulmonary:      Effort: Pulmonary effort is normal.      Breath sounds: Normal breath sounds.   Abdominal:      General: Abdomen is flat. Bowel sounds are normal.      Palpations: Abdomen is soft.      Tenderness: There is no abdominal tenderness.   Musculoskeletal:         General: Normal range of motion.      Cervical back: Neck supple.   Skin:     General: Skin is warm.   Neurological:      Mental Status: He is alert and oriented to person, place, and time. Mental status is at baseline.   Psychiatric:         Mood and Affect: Mood normal.       Recent Results (from the past 6 weeks)   Lipid Panel    Collection Time: 03/18/25  7:30 AM   Result Value Ref Range    CHOLESTEROL, TOTAL 139 <200 mg/dL    HDL CHOLESTEROL 49 > OR = 40 mg/dL    TRIGLYCERIDES 137 <150 mg/dL    LDL-CHOLESTEROL 68 mg/dL (calc)    CHOL/HDLC RATIO 2.8 <5.0 (calc)    NON HDL CHOLESTEROL 90 <130 mg/dL (calc)   Comprehensive Metabolic Panel    Collection Time: 03/18/25  7:30 AM   Result Value Ref Range    GLUCOSE 104 (H) 65 - 99 mg/dL    UREA NITROGEN (BUN) 16 7 - 25 mg/dL    CREATININE 0.87 0.70 - 1.22 mg/dL    EGFR 87 > OR = 60 mL/min/1.73m2    SODIUM 141 135 - 146 mmol/L    POTASSIUM 4.1 3.5 - 5.3 mmol/L    CHLORIDE 101 98 - 110 mmol/L    CARBON DIOXIDE 29 20 - 32 mmol/L    ELECTROLYTE BALANCE 11 7 - 17 mmol/L (calc)    CALCIUM 9.3 8.6 - 10.3 mg/dL    PROTEIN, TOTAL 7.1 6.1 - 8.1 g/dL    ALBUMIN 4.6 3.6 - 5.1 g/dL    BILIRUBIN, TOTAL 0.9 0.2 - 1.2 mg/dL    ALKALINE PHOSPHATASE 69 35 - 144 U/L    AST 24 10 - 35 U/L    ALT 26 9 - 46 U/L   CBC    Collection Time: 03/18/25  7:30 AM   Result Value Ref Range    WHITE BLOOD CELL COUNT 7.7 3.8 - 10.8 Thousand/uL    RED BLOOD CELL COUNT 4.63 4.20 - 5.80 Million/uL    HEMOGLOBIN 14.4 13.2 - 17.1 g/dL    HEMATOCRIT 44.0 38.5 - 50.0 %    MCV 95.0  80.0 - 100.0 fL    MCH 31.1 27.0 - 33.0 pg    MCHC 32.7 32.0 - 36.0 g/dL    RDW 12.8 11.0 - 15.0 %    PLATELET COUNT 241 140 - 400 Thousand/uL    MPV 12.1 7.5 - 12.5 fL   Prostate Specific Antigen, Screen    Collection Time: 03/18/25  7:30 AM   Result Value Ref Range    PSA, TOTAL 0.70 < OR = 4.00 ng/mL   TSH with reflex to Free T4 if abnormal    Collection Time: 03/18/25  7:30 AM   Result Value Ref Range    TSH W/REFLEX TO FT4 3.24 0.40 - 4.50 mIU/L   Vitamin D 25-Hydroxy,Total (for eval of Vitamin D levels)    Collection Time: 03/18/25  7:30 AM   Result Value Ref Range    VITAMIN D,25-OH,TOTAL,IA 44 30 - 100 ng/mL   Hemoglobin A1C    Collection Time: 03/18/25  7:30 AM   Result Value Ref Range    HEMOGLOBIN A1c 6.2 (H) <5.7 % of total Hgb    eAG (mg/dL) 131 mg/dL    eAG (mmol/L) 7.3 mmol/L     Recent labs reviewed with patient overall numbers are stable we will continue current medications and follow low-fat low-cholesterol diabetic diet    Follow through with specialist    Update CT scan of head with and without contrast given history of cerebral meningioma.    Return to office 4 months with repeat fasting labs    Assessment & Plan  Medicare annual wellness visit, subsequent  Recent labs reviewed    Refuses immunizations  refuses colon cancer screening         Benign essential hypertension  Stable continue current medication    Orders:    Follow Up In Primary Care - Established    Comprehensive Metabolic Panel; Future    Follow Up In Primary Care - Established; Future    Mixed hyperlipidemia  Stable continue Zetia 10 mg daily and Lipitor 80 mg daily    Orders:    Follow Up In Primary Care - Established    Comprehensive Metabolic Panel; Future    Lipid Panel; Future    Follow Up In Primary Care - Established; Future    Diabetes mellitus type 2, diet-controlled  A1c down to 6.2% continue dietary modifications    Orders:    Follow Up In Primary Care - Established    Comprehensive Metabolic Panel; Future    Hemoglobin  A1C; Future    Follow Up In Primary Care - Established; Future    Bilateral carotid artery stenosis  Patient scheduled for follow-up ultrasound continues to follow with Dr. Inman.         Cerebral meningioma (Multi)  Patient currently asymptomatic update CT scan of head with and without contrast    Orders:    CT head w and wo IV contrast; Future    Follow Up In Primary Care - Established; Future    Vertebral artery disease (CMS-HCC)  Clinically stable continue to follow with vascular surgery         Obesity (BMI 30-39.9)  Dietary modifications

## 2025-03-26 NOTE — ASSESSMENT & PLAN NOTE
A1c down to 6.2% continue dietary modifications    Orders:    Follow Up In Primary Care - Established    Comprehensive Metabolic Panel; Future    Hemoglobin A1C; Future    Follow Up In Primary Care - Established; Future

## 2025-03-26 NOTE — ASSESSMENT & PLAN NOTE
Stable continue current medication    Orders:    Follow Up In Primary Care - Established    Comprehensive Metabolic Panel; Future    Follow Up In Primary Care - Established; Future

## 2025-03-26 NOTE — ASSESSMENT & PLAN NOTE
Patient currently asymptomatic update CT scan of head with and without contrast    Orders:    CT head w and wo IV contrast; Future    Follow Up In Primary Care - Established; Future

## 2025-03-27 ENCOUNTER — APPOINTMENT (OUTPATIENT)
Dept: VASCULAR MEDICINE | Facility: HOSPITAL | Age: 80
End: 2025-03-27
Payer: MEDICARE

## 2025-04-17 ENCOUNTER — HOSPITAL ENCOUNTER (OUTPATIENT)
Dept: VASCULAR MEDICINE | Facility: HOSPITAL | Age: 80
Discharge: HOME | End: 2025-04-17
Payer: MEDICARE

## 2025-04-17 DIAGNOSIS — R09.89 CAROTID BRUIT, UNSPECIFIED LATERALITY: ICD-10-CM

## 2025-04-17 DIAGNOSIS — I65.23 OCCLUSION AND STENOSIS OF BILATERAL CAROTID ARTERIES: ICD-10-CM

## 2025-04-17 PROCEDURE — 93880 EXTRACRANIAL BILAT STUDY: CPT | Performed by: SURGERY

## 2025-04-17 PROCEDURE — 93880 EXTRACRANIAL BILAT STUDY: CPT

## 2025-04-18 ENCOUNTER — APPOINTMENT (OUTPATIENT)
Dept: RADIOLOGY | Facility: HOSPITAL | Age: 80
End: 2025-04-18
Payer: MEDICARE

## 2025-04-29 DIAGNOSIS — G56.01 CARPAL TUNNEL SYNDROME OF RIGHT WRIST: ICD-10-CM

## 2025-04-30 ENCOUNTER — HOSPITAL ENCOUNTER (OUTPATIENT)
Dept: RADIOLOGY | Facility: CLINIC | Age: 80
Discharge: HOME | End: 2025-04-30
Payer: MEDICARE

## 2025-04-30 ENCOUNTER — APPOINTMENT (OUTPATIENT)
Dept: ORTHOPEDIC SURGERY | Facility: CLINIC | Age: 80
End: 2025-04-30
Payer: MEDICARE

## 2025-04-30 ENCOUNTER — PREP FOR PROCEDURE (OUTPATIENT)
Dept: ORTHOPEDIC SURGERY | Facility: CLINIC | Age: 80
End: 2025-04-30

## 2025-04-30 VITALS — HEIGHT: 67 IN | BODY MASS INDEX: 31.71 KG/M2 | WEIGHT: 202 LBS

## 2025-04-30 DIAGNOSIS — G56.01 CARPAL TUNNEL SYNDROME OF RIGHT WRIST: Primary | ICD-10-CM

## 2025-04-30 DIAGNOSIS — G56.01 CARPAL TUNNEL SYNDROME OF RIGHT WRIST: ICD-10-CM

## 2025-04-30 DIAGNOSIS — G56.01 CARPAL TUNNEL SYNDROME ON RIGHT: ICD-10-CM

## 2025-04-30 PROCEDURE — 1036F TOBACCO NON-USER: CPT | Performed by: ORTHOPAEDIC SURGERY

## 2025-04-30 PROCEDURE — 1123F ACP DISCUSS/DSCN MKR DOCD: CPT | Performed by: ORTHOPAEDIC SURGERY

## 2025-04-30 PROCEDURE — 1160F RVW MEDS BY RX/DR IN RCRD: CPT | Performed by: ORTHOPAEDIC SURGERY

## 2025-04-30 PROCEDURE — 1159F MED LIST DOCD IN RCRD: CPT | Performed by: ORTHOPAEDIC SURGERY

## 2025-04-30 PROCEDURE — 99214 OFFICE O/P EST MOD 30 MIN: CPT | Performed by: ORTHOPAEDIC SURGERY

## 2025-04-30 PROCEDURE — 73130 X-RAY EXAM OF HAND: CPT | Mod: RT

## 2025-04-30 PROCEDURE — 73130 X-RAY EXAM OF HAND: CPT | Mod: RIGHT SIDE | Performed by: STUDENT IN AN ORGANIZED HEALTH CARE EDUCATION/TRAINING PROGRAM

## 2025-04-30 PROCEDURE — 1125F AMNT PAIN NOTED PAIN PRSNT: CPT | Performed by: ORTHOPAEDIC SURGERY

## 2025-04-30 ASSESSMENT — PAIN SCALES - GENERAL: PAINLEVEL_OUTOF10: 3

## 2025-04-30 ASSESSMENT — ENCOUNTER SYMPTOMS
OCCASIONAL FEELINGS OF UNSTEADINESS: 0
LOSS OF SENSATION IN FEET: 0
DEPRESSION: 0

## 2025-04-30 ASSESSMENT — PAIN - FUNCTIONAL ASSESSMENT: PAIN_FUNCTIONAL_ASSESSMENT: 0-10

## 2025-04-30 NOTE — PROGRESS NOTES
New Problem : Neftali Schmitt is coming in with right sided CTS. They have numbness/tingling in digits 1-4 and pain along the posterior side of the hand along the 1-2 MC's. Hx of a possible R wrist fracture/sprain and laceration years ago. No Hx of Sx or injections to the area. XR done today. Of note, they had a triple bypass done 2008

## 2025-04-30 NOTE — H&P (VIEW-ONLY)
79 y.o. male presents today for follow up of right hand numbness, tingling, weakness and pain. The patient reports symptoms for months, getting worse.  Pain is controlled.  Reports no previous surgeries, injections or trauma to the area.  Reports pain worse with use, better at rest.  Pain numb and tingly, does not wake them from sleep.   Worse driving a car and talking on a phone.  Worse during the day.  Got EMG.  We did surgery on the left about a year ago, doing well.  Would like surgery on the right.    Review of Systems    Constitutional: see HPI, no fever, no chills, not feeling tired, no significant weight gain or weight loss.   Eyes: No vision changes  ENT: no nosebleeds.   Cardiovascular: no chest pain.   Respiratory: no shortness of breath and no cough.   Gastrointestinal: no abdominal pain, no nausea, no vomiting and no diarrhea.   Musculoskeletal: per HPI  Neurological: no headache, no gait disturbances  Psychiatric: no depression and no sleep disturbances.   Endocrine: no muscle weakness and no muscle cramps.   Hematologic/Lymphatic: no swollen glands and no tendency for easy bruising or excessive swelling.     Patient's past medical history, past surgical history, allergies, and medications have been reviewed unless otherwise noted in the chart.     Carpal Tunnel Exam  Inspection:  no evidence of infection, no edema, no erythema, no ecchymosis, Palpation:  compartments are soft, no pain with palpation, Range of Motion:  full wrist and finger range of motion, Stability:  no wrist instability detected, Strength:  5/5 APB and intrinsics, Skin:  intact, Vascular:  capillary refill <2 seconds distally, Sensation:  decreased in the median nerve distribution, Test:  positive Tinel's at the Carpal Tunnel, positive Direct Carpal Tunnel Compression Test      Constitutional   General appearance: Alert and in no acute distress. Well developed, well nourished.    Eyes   External Eye, Conjunctiva and lids: Normal  external exam - pupils were equal in size, round, reactive to light (PERRL) with normal accommodation and extraocular movements intact (EOMI).   Ears, Nose, Mouth, and Throat   Hearing: Normal.   Neck   Neck: No neck mass was observed. Supple.   Pulmonary   Respiratory effort: No respiratory distress.   Auscultation of lungs: Clear bilateral breath sounds.   Cardiovascular   Examination of extremities: No peripheral edema.   Auscultation of heart: Heart rate and rhythm were normal   Psychiatric   Judgment and insight: Intact.   Orientation to person, place, and time: Alert and oriented x 3.       Mood and affect: Normal.      EMG -mod severe left, mod right CTS    Right CTS  Surgery discussion: I discussed the diagnosis and treatment options with the patient today along with their associated risks and benefits. After thorough discussion, the patient has elected to proceed with surgical intervention. The patient understands the risks of,including, but not limited to, bleeding, infection, loss of life or limb, pain, permanent numbness, tingling, weakness, loss of motion, failure of the goals of surgery or the potential need for additional surgery. The patient would like to accept these risks and proceed. All questions were answered to the patients satisfaction and the patient seems satisfied with the plan.    Plan right ECTR  FU 2 weeks after - No XR

## 2025-05-07 ENCOUNTER — ANESTHESIA EVENT (OUTPATIENT)
Dept: OPERATING ROOM | Facility: HOSPITAL | Age: 80
End: 2025-05-07
Payer: MEDICARE

## 2025-05-13 ENCOUNTER — ANESTHESIA (OUTPATIENT)
Dept: OPERATING ROOM | Facility: HOSPITAL | Age: 80
End: 2025-05-13
Payer: MEDICARE

## 2025-05-13 ENCOUNTER — PHARMACY VISIT (OUTPATIENT)
Dept: PHARMACY | Facility: CLINIC | Age: 80
End: 2025-05-13
Payer: COMMERCIAL

## 2025-05-13 ENCOUNTER — HOSPITAL ENCOUNTER (OUTPATIENT)
Facility: HOSPITAL | Age: 80
Setting detail: OUTPATIENT SURGERY
Discharge: HOME | End: 2025-05-13
Attending: ORTHOPAEDIC SURGERY | Admitting: ORTHOPAEDIC SURGERY
Payer: MEDICARE

## 2025-05-13 VITALS
DIASTOLIC BLOOD PRESSURE: 68 MMHG | SYSTOLIC BLOOD PRESSURE: 139 MMHG | WEIGHT: 202 LBS | TEMPERATURE: 98 F | RESPIRATION RATE: 13 BRPM | OXYGEN SATURATION: 95 % | HEIGHT: 67 IN | HEART RATE: 55 BPM | BODY MASS INDEX: 31.71 KG/M2

## 2025-05-13 DIAGNOSIS — G56.01 CARPAL TUNNEL SYNDROME ON RIGHT: Primary | ICD-10-CM

## 2025-05-13 PROCEDURE — 3700000001 HC GENERAL ANESTHESIA TIME - INITIAL BASE CHARGE: Performed by: ORTHOPAEDIC SURGERY

## 2025-05-13 PROCEDURE — 3700000002 HC GENERAL ANESTHESIA TIME - EACH INCREMENTAL 1 MINUTE: Performed by: ORTHOPAEDIC SURGERY

## 2025-05-13 PROCEDURE — 2500000004 HC RX 250 GENERAL PHARMACY W/ HCPCS (ALT 636 FOR OP/ED): Performed by: ORTHOPAEDIC SURGERY

## 2025-05-13 PROCEDURE — 7100000010 HC PHASE TWO TIME - EACH INCREMENTAL 1 MINUTE: Performed by: ORTHOPAEDIC SURGERY

## 2025-05-13 PROCEDURE — 3600000008 HC OR TIME - EACH INCREMENTAL 1 MINUTE - PROCEDURE LEVEL THREE: Performed by: ORTHOPAEDIC SURGERY

## 2025-05-13 PROCEDURE — RXMED WILLOW AMBULATORY MEDICATION CHARGE

## 2025-05-13 PROCEDURE — 3600000003 HC OR TIME - INITIAL BASE CHARGE - PROCEDURE LEVEL THREE: Performed by: ORTHOPAEDIC SURGERY

## 2025-05-13 PROCEDURE — 2500000004 HC RX 250 GENERAL PHARMACY W/ HCPCS (ALT 636 FOR OP/ED): Mod: JZ | Performed by: ORTHOPAEDIC SURGERY

## 2025-05-13 PROCEDURE — 29848 WRIST ENDOSCOPY/SURGERY: CPT | Performed by: ORTHOPAEDIC SURGERY

## 2025-05-13 PROCEDURE — 2500000004 HC RX 250 GENERAL PHARMACY W/ HCPCS (ALT 636 FOR OP/ED): Mod: JW | Performed by: NURSE ANESTHETIST, CERTIFIED REGISTERED

## 2025-05-13 PROCEDURE — 7100000009 HC PHASE TWO TIME - INITIAL BASE CHARGE: Performed by: ORTHOPAEDIC SURGERY

## 2025-05-13 PROCEDURE — 2500000005 HC RX 250 GENERAL PHARMACY W/O HCPCS: Performed by: ORTHOPAEDIC SURGERY

## 2025-05-13 PROCEDURE — 2720000007 HC OR 272 NO HCPCS: Performed by: ORTHOPAEDIC SURGERY

## 2025-05-13 PROCEDURE — 2500000004 HC RX 250 GENERAL PHARMACY W/ HCPCS (ALT 636 FOR OP/ED): Mod: JZ | Performed by: ANESTHESIOLOGY

## 2025-05-13 RX ORDER — SODIUM CHLORIDE, SODIUM LACTATE, POTASSIUM CHLORIDE, CALCIUM CHLORIDE 600; 310; 30; 20 MG/100ML; MG/100ML; MG/100ML; MG/100ML
100 INJECTION, SOLUTION INTRAVENOUS CONTINUOUS
Status: DISCONTINUED | OUTPATIENT
Start: 2025-05-13 | End: 2025-05-13 | Stop reason: HOSPADM

## 2025-05-13 RX ORDER — PROPOFOL 10 MG/ML
INJECTION, EMULSION INTRAVENOUS AS NEEDED
Status: DISCONTINUED | OUTPATIENT
Start: 2025-05-13 | End: 2025-05-13

## 2025-05-13 RX ORDER — LABETALOL HYDROCHLORIDE 5 MG/ML
5 INJECTION, SOLUTION INTRAVENOUS ONCE AS NEEDED
Status: DISCONTINUED | OUTPATIENT
Start: 2025-05-13 | End: 2025-05-13 | Stop reason: HOSPADM

## 2025-05-13 RX ORDER — DROPERIDOL 2.5 MG/ML
0.62 INJECTION, SOLUTION INTRAMUSCULAR; INTRAVENOUS ONCE AS NEEDED
Status: DISCONTINUED | OUTPATIENT
Start: 2025-05-13 | End: 2025-05-13 | Stop reason: HOSPADM

## 2025-05-13 RX ORDER — DIPHENHYDRAMINE HYDROCHLORIDE 50 MG/ML
12.5 INJECTION, SOLUTION INTRAMUSCULAR; INTRAVENOUS ONCE AS NEEDED
Status: DISCONTINUED | OUTPATIENT
Start: 2025-05-13 | End: 2025-05-13 | Stop reason: HOSPADM

## 2025-05-13 RX ORDER — FAMOTIDINE 10 MG/ML
20 INJECTION, SOLUTION INTRAVENOUS ONCE
Status: COMPLETED | OUTPATIENT
Start: 2025-05-13 | End: 2025-05-13

## 2025-05-13 RX ORDER — LIDOCAINE HYDROCHLORIDE AND EPINEPHRINE 10; 20 UG/ML; MG/ML
INJECTION, SOLUTION INFILTRATION; PERINEURAL AS NEEDED
Status: DISCONTINUED | OUTPATIENT
Start: 2025-05-13 | End: 2025-05-13 | Stop reason: HOSPADM

## 2025-05-13 RX ORDER — LIDOCAINE HYDROCHLORIDE 10 MG/ML
0.1 INJECTION, SOLUTION EPIDURAL; INFILTRATION; INTRACAUDAL; PERINEURAL ONCE
Status: DISCONTINUED | OUTPATIENT
Start: 2025-05-13 | End: 2025-05-13 | Stop reason: HOSPADM

## 2025-05-13 RX ORDER — MEPERIDINE HYDROCHLORIDE 25 MG/ML
12.5 INJECTION INTRAMUSCULAR; INTRAVENOUS; SUBCUTANEOUS EVERY 10 MIN PRN
Status: DISCONTINUED | OUTPATIENT
Start: 2025-05-13 | End: 2025-05-13 | Stop reason: HOSPADM

## 2025-05-13 RX ORDER — HYDRALAZINE HYDROCHLORIDE 20 MG/ML
5 INJECTION INTRAMUSCULAR; INTRAVENOUS EVERY 30 MIN PRN
Status: DISCONTINUED | OUTPATIENT
Start: 2025-05-13 | End: 2025-05-13 | Stop reason: HOSPADM

## 2025-05-13 RX ORDER — SODIUM CHLORIDE, SODIUM LACTATE, POTASSIUM CHLORIDE, CALCIUM CHLORIDE 600; 310; 30; 20 MG/100ML; MG/100ML; MG/100ML; MG/100ML
75 INJECTION, SOLUTION INTRAVENOUS CONTINUOUS
Status: DISCONTINUED | OUTPATIENT
Start: 2025-05-13 | End: 2025-05-13 | Stop reason: HOSPADM

## 2025-05-13 RX ORDER — MORPHINE SULFATE 2 MG/ML
2 INJECTION, SOLUTION INTRAMUSCULAR; INTRAVENOUS EVERY 5 MIN PRN
Status: DISCONTINUED | OUTPATIENT
Start: 2025-05-13 | End: 2025-05-13 | Stop reason: HOSPADM

## 2025-05-13 RX ORDER — OXYCODONE AND ACETAMINOPHEN 5; 325 MG/1; MG/1
1 TABLET ORAL EVERY 4 HOURS PRN
Status: DISCONTINUED | OUTPATIENT
Start: 2025-05-13 | End: 2025-05-13 | Stop reason: HOSPADM

## 2025-05-13 RX ORDER — HYDROCODONE BITARTRATE AND ACETAMINOPHEN 5; 325 MG/1; MG/1
1 TABLET ORAL EVERY 6 HOURS PRN
Qty: 10 TABLET | Refills: 0 | Status: SHIPPED | OUTPATIENT
Start: 2025-05-13

## 2025-05-13 RX ORDER — CEFAZOLIN SODIUM 2 G/50ML
2 SOLUTION INTRAVENOUS ONCE
Status: COMPLETED | OUTPATIENT
Start: 2025-05-13 | End: 2025-05-13

## 2025-05-13 RX ORDER — LIDOCAINE HCL/PF 100 MG/5ML
SYRINGE (ML) INTRAVENOUS AS NEEDED
Status: DISCONTINUED | OUTPATIENT
Start: 2025-05-13 | End: 2025-05-13

## 2025-05-13 RX ORDER — ALBUTEROL SULFATE 0.83 MG/ML
2.5 SOLUTION RESPIRATORY (INHALATION) ONCE AS NEEDED
Status: DISCONTINUED | OUTPATIENT
Start: 2025-05-13 | End: 2025-05-13 | Stop reason: HOSPADM

## 2025-05-13 RX ORDER — ONDANSETRON HYDROCHLORIDE 2 MG/ML
4 INJECTION, SOLUTION INTRAVENOUS ONCE AS NEEDED
Status: DISCONTINUED | OUTPATIENT
Start: 2025-05-13 | End: 2025-05-13 | Stop reason: HOSPADM

## 2025-05-13 RX ORDER — BUPIVACAINE HCL/EPINEPHRINE 0.5-1:200K
VIAL (ML) INJECTION AS NEEDED
Status: DISCONTINUED | OUTPATIENT
Start: 2025-05-13 | End: 2025-05-13 | Stop reason: HOSPADM

## 2025-05-13 RX ORDER — FENTANYL CITRATE 50 UG/ML
INJECTION, SOLUTION INTRAMUSCULAR; INTRAVENOUS AS NEEDED
Status: DISCONTINUED | OUTPATIENT
Start: 2025-05-13 | End: 2025-05-13

## 2025-05-13 RX ADMIN — FENTANYL CITRATE 50 MCG: 50 INJECTION INTRAMUSCULAR; INTRAVENOUS at 08:35

## 2025-05-13 RX ADMIN — FAMOTIDINE 20 MG: 10 INJECTION, SOLUTION INTRAVENOUS at 08:11

## 2025-05-13 RX ADMIN — PROPOFOL 40 MG: 10 INJECTION, EMULSION INTRAVENOUS at 08:42

## 2025-05-13 RX ADMIN — PROPOFOL 60 MG: 10 INJECTION, EMULSION INTRAVENOUS at 08:35

## 2025-05-13 RX ADMIN — CEFAZOLIN SODIUM 2 G: 2 SOLUTION INTRAVENOUS at 08:31

## 2025-05-13 RX ADMIN — LIDOCAINE HYDROCHLORIDE 60 MG: 20 INJECTION, SOLUTION INTRAVENOUS at 08:35

## 2025-05-13 SDOH — HEALTH STABILITY: MENTAL HEALTH: CURRENT SMOKER: 0

## 2025-05-13 ASSESSMENT — COLUMBIA-SUICIDE SEVERITY RATING SCALE - C-SSRS
6. HAVE YOU EVER DONE ANYTHING, STARTED TO DO ANYTHING, OR PREPARED TO DO ANYTHING TO END YOUR LIFE?: NO
1. IN THE PAST MONTH, HAVE YOU WISHED YOU WERE DEAD OR WISHED YOU COULD GO TO SLEEP AND NOT WAKE UP?: NO
2. HAVE YOU ACTUALLY HAD ANY THOUGHTS OF KILLING YOURSELF?: NO

## 2025-05-13 ASSESSMENT — PAIN - FUNCTIONAL ASSESSMENT
PAIN_FUNCTIONAL_ASSESSMENT: 0-10

## 2025-05-13 ASSESSMENT — PAIN SCALES - GENERAL
PAINLEVEL_OUTOF10: 0 - NO PAIN
PAIN_LEVEL: 0
PAINLEVEL_OUTOF10: 0 - NO PAIN
PAINLEVEL_OUTOF10: 0 - NO PAIN

## 2025-05-13 NOTE — DISCHARGE INSTRUCTIONS
[facility]  [phone]   Fax: [phone] [address] [facility] [address]   [address] [facility] -  [address] [place] [id number]    Upper Extremity - Endoscopic Carpal Tunnel Release    1. Dressing    You have a soft bandage over the operative site.  DO NOT GET DRESSING WET! Once at home, if your dressing feels too tight or mistakenly gets wet, please contact the office. This bandage can be removed in 48 hours and replaced in with Band-Aids as needed. After 48 hours you may wash your hands and shower, but no submerging.    If your sutures are visible (black strings), they will be removed about 10-14 days after surgery.  You should make an appointment to see your physician 10-14 after surgery.    2. Activity     Most people underestimate the length of time required to fully recover from surgery.  It is recommended you take some pain medication the evening following your surgery so when the local anesthetic wears off (in the middle of the night), you are not in severe pain.   With pain medication, start at the lowest dose that controls your pain.       After the first 1-3 days, we encourage you to balance your activity between ambulation, sitting in a chair, and resting in bed with your arm elevated. Let swelling and pain be your guide to activity, you should avoid prolonged (over 20 minutes) standing or sitting. In general, limit your activities to home for the first 1-3 days.    3. Pain    You will be discharged to home with a prescription for oral pain medication. A most important factor in pain control is rest and elevation. Ice may also be applied to the operative site for 30 minute intervals. Please use a waterproof bag for ice or cold packs.  Again, as you feel the numbness resolving and some onset of discomfort, please take pain medication, don't wait till full resolution of block.   Try to use the lowest dose of pain medication that controls your pain.       The pain medication may cause constipation. Drink plenty of fluids, eat fruits and vegetables. You may use an over the counter laxative or stool softener if necessary. Take pain medication with some food in your stomach, as prescribed by your pharmacist.     4. Elevation     Elevation of the operative extremity is critical. Elevation reduces swelling and minimizes pain. Less swelling is associated with a lower infectious rate, fewer wound complications, less post-operative stiffness, and more rapid recovery of function. To keep the swelling down, your hand must be kept above the level of your heart.

## 2025-05-13 NOTE — OP NOTE
ORTHOPEDIC OPERATIVE NOTE    Name: Neftali Schmitt  : 1945  Surgeon: Kelton Zamorano DO  Facility: [facility]  Date of Surgery: 25  ORTHOPEDIC OPERATIVE NOTE    SURGEON:     Kelton Zamorano DO  PRE OP DIAGNOSIS:  Carpal Tunnel Syndrome right Wrist  POST OP DIAGNOSIS:  Same  PROCEDURE:   Endoscopic Carpal Tunnel Release right Wrist    ANESTHESIA:  Local with sedation  ASSISTANT:    SA lizarraga  COMPLICATIONS:   None.  CONDITION:    Satisfactory to PACU.  BLOOD LOSS: Minimal    INDICATION FOR PROCEDURE: The patient is a 79 y.o. -year-old male who has failed nonsurgical management for right carpal tunnel syndrome including night splints. They had an extensive workup consisting of signs, symptoms, and clinical history of EMG nerve conduction study consistent with right carpal syndrome.  The patient was seen in the office, fully informed risks and benefits of the procedure, and elected to undergo the procedure.    PROCEDURE IN DETAIL: The patient was seen and consented preoperatively with side and site of surgery appropriately marked. The patient was taken back to the operative suite, placed supine on the operative table, and placed on monitor for the duration of case. The patient was administered sedation and monitored throughout the entire surgery by Department of Anesthesia. While sedated, the patient was administered 5 cc of mixed marcaine and lidocaine to the volar aspect of wrist and palm for local anesthesia. The operative upper extremity was then sterilely prepped and draped in sterile orthopedic fashion, elevated with an Esmarch, and tourniquet inflated to 250 mmHg for the duration of case, and time-out was performed confirming site of surgery and surgery to be performed.    A 1-cm transverse incision was made to the ulnar aspect of the palmaris longus at proximal wrist crease. Skin and underlying tissues were sharply dissected down. Hemostasis maintained with bipolar electrocauterization.  Distally based flap of the fascia overlying the median nerve was then released, and under direct visualization, proximal fascia was released with Littler scissors. The elevator and dilator were then placed in the carpal tunnel with appropriate ease of passage and corrugated feel of the undersurface of transcarpal ligament. The endoscope was then placed under direct visualization. The transverse carpal ligament was released in its entirety, confirmed both visually as well as by utilizing endoscope as a probe, which freely passed following release. The nerve was evaluated. No evidence of lesion or adhesion was present.    The wound was irrigated with sterile saline, closed with 5-0 nylon suture. Sterile dressing was then placed of Xeroform and 4x4s affixed with Kerlix and Ace wrap. Tourniquet was deflated, and the patient was taken to PACU in satisfactory condition.    Electronically signed  Kelton Zamorano DO

## 2025-05-13 NOTE — ANESTHESIA POSTPROCEDURE EVALUATION
Patient: Neftali Schmitt    Procedure Summary       Date: 05/13/25 Room / Location: [facility] OR 07 / Virtual [facility] OR    Anesthesia Start: 0831 Anesthesia Stop: 0852    Procedure: Right Endoscopic Tunnel Release mac/local (Right: Wrist) Diagnosis:       Carpal tunnel syndrome on right      (Carpal tunnel syndrome on right [G56.01])    Surgeons: Eze Zamorano DO Responsible Provider: CHARLES Gold    Anesthesia Type: MAC ASA Status: 3            Anesthesia Type: MAC    Vitals Value Taken Time   /68 05/13/25 09:47   Temp 36.7 °C (98 °F) 05/13/25 09:47   Pulse 55 05/13/25 09:47   Resp 13 05/13/25 09:47   SpO2 95 % 05/13/25 09:47       Anesthesia Post Evaluation    Patient location during evaluation: PACU  Patient participation: complete - patient participated  Level of consciousness: awake  Pain score: 0  Pain management: adequate  Airway patency: patent  Cardiovascular status: acceptable  Respiratory status: acceptable  Hydration status: acceptable  Postoperative Nausea and Vomiting: none        There were no known notable events for this encounter.

## 2025-05-13 NOTE — ANESTHESIA PREPROCEDURE EVALUATION
Patient: Neftali Schmitt    Procedure Information       Date/Time: 05/13/25 0919    Procedure: Right Endoscopic Tunnel Release mac/local (Right: Wrist) - local/mac,2gs ancef, 15 mins, no special    Location: [facility] OR 07 / Virtual [facility] OR    Surgeons: Eze Zamorano, DO            Relevant Problems   Anesthesia (within normal limits)      Cardiac   (+) Abnormal EKG   (+) Benign essential hypertension   (+) CAD (coronary artery disease)   (+) Hyperlipidemia      Neuro   (+) Bilateral carotid artery disease   (+) Bilateral carpal tunnel syndrome   (+) Carpal tunnel syndrome of left wrist   (+) Carpal tunnel syndrome on right      GI   (+) GERD (gastroesophageal reflux disease)      Endocrine   (+) Diabetes mellitus type 2, diet-controlled      Musculoskeletal   (+) Bilateral carpal tunnel syndrome   (+) Carpal tunnel syndrome of left wrist   (+) Carpal tunnel syndrome on right   (+) Chronic neck pain      HEENT   (+) Mucous retention cyst of maxillary sinus   (+) Osteoma of nasal sinus   (+) Sensorineural hearing loss (SNHL), bilateral       Clinical information reviewed:                   NPO Detail:  No data recorded     Physical Exam    Airway  Mallampati: I  TM distance: >3 FB  Neck ROM: full  Mouth opening: 3 or more finger widths     Cardiovascular - normal exam  Rhythm: regular  Rate: normal     Dental    Pulmonary - normal examBreath sounds clear to auscultation     Abdominal (+) obese  Abdomen: soft             Anesthesia Plan    History of general anesthesia?: yes  History of complications of general anesthesia?: no    ASA 3     MAC     The patient is not a current smoker.  Patient did not smoke on day of procedure.    intravenous induction   Anesthetic plan and risks discussed with patient.  Use of blood products discussed with patient who consented to blood products.    Plan discussed with CRNA.

## 2025-05-22 ENCOUNTER — OFFICE VISIT (OUTPATIENT)
Dept: ORTHOPEDIC SURGERY | Facility: HOSPITAL | Age: 80
End: 2025-05-22
Payer: MEDICARE

## 2025-05-22 VITALS — BODY MASS INDEX: 31.71 KG/M2 | HEIGHT: 67 IN | WEIGHT: 202 LBS

## 2025-05-22 DIAGNOSIS — G56.01 CARPAL TUNNEL SYNDROME OF RIGHT WRIST: Primary | ICD-10-CM

## 2025-05-22 PROCEDURE — 99024 POSTOP FOLLOW-UP VISIT: CPT | Performed by: ORTHOPAEDIC SURGERY

## 2025-05-22 PROCEDURE — 1036F TOBACCO NON-USER: CPT | Performed by: ORTHOPAEDIC SURGERY

## 2025-05-22 PROCEDURE — 1159F MED LIST DOCD IN RCRD: CPT | Performed by: ORTHOPAEDIC SURGERY

## 2025-05-22 PROCEDURE — 1160F RVW MEDS BY RX/DR IN RCRD: CPT | Performed by: ORTHOPAEDIC SURGERY

## 2025-05-22 PROCEDURE — 99213 OFFICE O/P EST LOW 20 MIN: CPT | Performed by: ORTHOPAEDIC SURGERY

## 2025-05-22 ASSESSMENT — ENCOUNTER SYMPTOMS
OCCASIONAL FEELINGS OF UNSTEADINESS: 0
DEPRESSION: 0
LOSS OF SENSATION IN FEET: 0

## 2025-05-22 NOTE — PROGRESS NOTES
79 y.o. male presents today for for follow up after right endoscopic carpal tunnel release. The patient reports doing well symptoms much improved. The DOS was 2 weeks ago. Pain is controlled. Patient denies numbness and tingling.     Review of Systems    Constitutional: see HPI, no fever, no chills, not feeling tired, no significant weight gain or weight loss.   Eyes: No vision changes  ENT: no nosebleeds.   Cardiovascular: no chest pain.   Respiratory: no shortness of breath and no cough.   Gastrointestinal: no abdominal pain, no nausea, no vomiting and no diarrhea.   Musculoskeletal: per HPI  Neurological: no headache, no gait disturbances  Psychiatric: no depression and no sleep disturbances.   Endocrine: no muscle weakness and no muscle cramps.   Hematologic/Lymphatic: no swollen glands and no tendency for easy bruising or excessive swelling.     Patient's past medical history, past surgical history, allergies, and medications have been reviewed unless otherwise noted in the chart.     Hand/Wrist Post-Op Exam  Inspection:  no evidence of infection, no erythema, Palpation:  compartments are soft, Range of Motion:  F/E 80/80, S/P 90/90 Finger ROM Full extension, full flexion Stability:  stable Strength:  5/5 F/E, 5/5 Adduction/Abduction 5/5 Opposition Skin:  incision site clean, dry and intact, healing without complication, Vascular:  capillary refill <2 seconds distally, Sensation:  intact to light touch distally.    Constitutional   General appearance: Alert and in no acute distress. Well developed, well nourished.    Eyes   External Eye, Conjunctiva and lids: Normal external exam - pupils were equal in size, round, reactive to light (PERRL) with normal accommodation and extraocular movements intact (EOMI).   Ears, Nose, Mouth, and Throat   Hearing: Normal.   Neck   Neck: No neck mass was observed. Supple.   Pulmonary   Respiratory effort: No respiratory distress.   Cardiovascular   Examination of extremities:  No peripheral edema.   Psychiatric   Judgment and insight: Intact.   Orientation to person, place, and time: Alert and oriented x 3.       Mood and affect: Normal.      2 weeks status post right endoscopic carpal tunnel release  Based on the history, physical exam and imaging studies above, the patient's presentation is consistent with the above diagnosis.  I had a long discussion with the patient regarding their presentation and the treatment options.    We again discussed her treatment options going forward along with their associated risks and benefits. After thorough discussion, the patient has elected to proceed with postoperative care. All questions were answered to the patients satisfaction who seems satisfied with the plan.  They will call the office with any questions/concerns.     Sutures removed  Steris  Vitamin E cream prn to scar tissue  Activities as tolerated  FU 4-6 weeks prn - no XR

## 2025-05-22 NOTE — PROGRESS NOTES
9 days S/P Right ECTR DOS 5/13/2025  Patient doing well no concerns sutures removed steri strip applied no issues

## 2025-06-10 ENCOUNTER — HOSPITAL ENCOUNTER (OUTPATIENT)
Dept: RADIOLOGY | Facility: CLINIC | Age: 80
Discharge: HOME | End: 2025-06-10
Payer: MEDICARE

## 2025-06-10 ENCOUNTER — APPOINTMENT (OUTPATIENT)
Dept: PRIMARY CARE | Facility: CLINIC | Age: 80
End: 2025-06-10
Payer: MEDICARE

## 2025-06-10 VITALS
DIASTOLIC BLOOD PRESSURE: 79 MMHG | BODY MASS INDEX: 31.95 KG/M2 | SYSTOLIC BLOOD PRESSURE: 136 MMHG | TEMPERATURE: 97.7 F | OXYGEN SATURATION: 97 % | WEIGHT: 204 LBS | HEART RATE: 62 BPM | RESPIRATION RATE: 15 BRPM

## 2025-06-10 DIAGNOSIS — I25.10 CORONARY ARTERY DISEASE INVOLVING NATIVE CORONARY ARTERY OF NATIVE HEART WITHOUT ANGINA PECTORIS: ICD-10-CM

## 2025-06-10 DIAGNOSIS — K21.9 GASTROESOPHAGEAL REFLUX DISEASE, UNSPECIFIED WHETHER ESOPHAGITIS PRESENT: Primary | ICD-10-CM

## 2025-06-10 DIAGNOSIS — R07.89 ATYPICAL CHEST PAIN: ICD-10-CM

## 2025-06-10 DIAGNOSIS — I10 BENIGN ESSENTIAL HYPERTENSION: ICD-10-CM

## 2025-06-10 PROCEDURE — 1160F RVW MEDS BY RX/DR IN RCRD: CPT | Performed by: FAMILY MEDICINE

## 2025-06-10 PROCEDURE — 71046 X-RAY EXAM CHEST 2 VIEWS: CPT | Performed by: RADIOLOGY

## 2025-06-10 PROCEDURE — 1036F TOBACCO NON-USER: CPT | Performed by: FAMILY MEDICINE

## 2025-06-10 PROCEDURE — 3075F SYST BP GE 130 - 139MM HG: CPT | Performed by: FAMILY MEDICINE

## 2025-06-10 PROCEDURE — 99214 OFFICE O/P EST MOD 30 MIN: CPT | Performed by: FAMILY MEDICINE

## 2025-06-10 PROCEDURE — 3078F DIAST BP <80 MM HG: CPT | Performed by: FAMILY MEDICINE

## 2025-06-10 PROCEDURE — 93000 ELECTROCARDIOGRAM COMPLETE: CPT | Performed by: FAMILY MEDICINE

## 2025-06-10 PROCEDURE — 71046 X-RAY EXAM CHEST 2 VIEWS: CPT

## 2025-06-10 PROCEDURE — 1159F MED LIST DOCD IN RCRD: CPT | Performed by: FAMILY MEDICINE

## 2025-06-10 PROCEDURE — 1124F ACP DISCUSS-NO DSCNMKR DOCD: CPT | Performed by: FAMILY MEDICINE

## 2025-06-10 RX ORDER — PANTOPRAZOLE SODIUM 40 MG/1
40 TABLET, DELAYED RELEASE ORAL DAILY
Qty: 30 TABLET | Refills: 1 | Status: SHIPPED | OUTPATIENT
Start: 2025-06-10 | End: 2025-08-09

## 2025-06-10 ASSESSMENT — ENCOUNTER SYMPTOMS
DIARRHEA: 0
ARTHRALGIAS: 0
ABDOMINAL PAIN: 0
SHORTNESS OF BREATH: 0
CHILLS: 0
CONSTIPATION: 0
CONFUSION: 0
PALPITATIONS: 0
FEVER: 0
CHEST TIGHTNESS: 0
NAUSEA: 0
VOMITING: 0

## 2025-06-10 NOTE — PROGRESS NOTES
Subjective   Patient ID: Neftali Schmitt is a 79 y.o. male who presents for GERD (Worsening).    GERD  He complains of chest pain. He reports no abdominal pain or no nausea.      Patient comes in today thinking that his acid reflux is worsening upon further questioning though he says he cannot really definitively pinpoint any particular beverages or food items that are triggering symptoms.  He says the symptoms seem to start in his mouth with a burning sensation and then go down the center of his chest get some relief from drinking ginger ale.  He does get occasional burping and belching.  Denies nausea vomiting or changes in appetite denies any burning sensation coming up from the mid epigastric area into his chest or into his mouth.  He notices symptoms are triggered if he goes for a prolonged walk such as down his long driveway.  Denies shortness of breath.  He denies any pain numbness or tingling radiating to his back shoulders jaw or down his extremities.  Symptoms have been going on over the past few weeks.  He tried some over-the-counter acid reflux treatments such as Tums as well as medication with minimal improvement  Review of Systems   Constitutional:  Negative for chills and fever.   HENT:  Negative for congestion and ear pain.    Eyes:  Negative for visual disturbance.   Respiratory:  Negative for chest tightness and shortness of breath.    Cardiovascular:  Positive for chest pain. Negative for palpitations.   Gastrointestinal:  Negative for abdominal pain, constipation, diarrhea, nausea and vomiting.   Musculoskeletal:  Negative for arthralgias.   Skin:  Negative for pallor.   Psychiatric/Behavioral:  Negative for confusion.        Objective   /79   Pulse 62   Temp 36.5 °C (97.7 °F)   Resp 15   Wt 92.5 kg (204 lb)   SpO2 97%   BMI 31.95 kg/m²     Physical Exam  Vitals and nursing note reviewed.   Constitutional:       General: He is not in acute distress.     Appearance: Normal  appearance. He is not ill-appearing.   HENT:      Head: Normocephalic and atraumatic.      Right Ear: Tympanic membrane, ear canal and external ear normal.      Left Ear: Tympanic membrane, ear canal and external ear normal.      Mouth/Throat:      Pharynx: Oropharynx is clear.   Eyes:      Extraocular Movements: Extraocular movements intact.   Cardiovascular:      Rate and Rhythm: Normal rate and regular rhythm.      Pulses: Normal pulses.      Heart sounds: Normal heart sounds.   Pulmonary:      Effort: Pulmonary effort is normal.      Breath sounds: Normal breath sounds.   Abdominal:      General: Abdomen is flat. Bowel sounds are normal. There is no distension.      Palpations: Abdomen is soft.      Tenderness: There is no abdominal tenderness. There is no guarding or rebound.   Musculoskeletal:         General: Normal range of motion.      Cervical back: Neck supple.   Skin:     General: Skin is warm.   Neurological:      Mental Status: He is alert and oriented to person, place, and time. Mental status is at baseline.   Psychiatric:         Mood and Affect: Mood normal.     Atypical chest pain versus acid reflux    EKG no acute changes keep appoint with cardiology this month  Chest x-ray  Keep a food diary  Protonix 40 mg daily    Call or go to the ER if anything worsens    Keep larissa follow-up appointment with us in about 6 weeks with fasting lab work and reassessment of his case and review specialty input.    Assessment/Plan   Problem List Items Addressed This Visit           ICD-10-CM    Benign essential hypertension I10    Stable continue current treatment         CAD (coronary artery disease) I25.10    Update EKG keep appointment with cardiology         Relevant Orders    ECG 12 lead (Clinic Performed) (Completed)    GERD (gastroesophageal reflux disease) - Primary K21.9    Keep food diary to see if he can noticed any triggers in the meantime start Protonix 40 mg daily         Relevant Medications     pantoprazole (ProtoNix) 40 mg EC tablet    Atypical chest pain R07.89    EKG no acute changes  Chest x-ray  Keep appoint with cardiology this month for reevaluation  Go to the ER if anything should worsen    Discussed with the patient that symptoms may be GI related although he does not have the classic presentation.  He will keep the food diary to see if he notices any connection with symptoms being triggered.         Relevant Orders    ECG 12 lead (Clinic Performed) (Completed)    XR chest 2 views

## 2025-06-10 NOTE — ASSESSMENT & PLAN NOTE
EKG no acute changes  Chest x-ray  Keep appoint with cardiology this month for reevaluation  Go to the ER if anything should worsen    Discussed with the patient that symptoms may be GI related although he does not have the classic presentation.  He will keep the food diary to see if he notices any connection with symptoms being triggered.

## 2025-06-26 DIAGNOSIS — E11.9 DIABETES MELLITUS TYPE 2, DIET-CONTROLLED: ICD-10-CM

## 2025-06-26 DIAGNOSIS — I10 BENIGN ESSENTIAL HYPERTENSION: ICD-10-CM

## 2025-06-26 DIAGNOSIS — E78.2 MIXED HYPERLIPIDEMIA: ICD-10-CM

## 2025-07-10 ENCOUNTER — APPOINTMENT (OUTPATIENT)
Dept: RADIOLOGY | Facility: HOSPITAL | Age: 80
End: 2025-07-10
Payer: MEDICARE

## 2025-07-10 ENCOUNTER — APPOINTMENT (OUTPATIENT)
Dept: CARDIOLOGY | Facility: HOSPITAL | Age: 80
End: 2025-07-10
Payer: MEDICARE

## 2025-07-10 ENCOUNTER — HOSPITAL ENCOUNTER (INPATIENT)
Facility: HOSPITAL | Age: 80
DRG: 445 | End: 2025-07-10
Attending: STUDENT IN AN ORGANIZED HEALTH CARE EDUCATION/TRAINING PROGRAM | Admitting: INTERNAL MEDICINE
Payer: MEDICARE

## 2025-07-10 DIAGNOSIS — I25.118 CORONARY ARTERY DISEASE INVOLVING NATIVE CORONARY ARTERY OF NATIVE HEART WITH OTHER FORM OF ANGINA PECTORIS: ICD-10-CM

## 2025-07-10 DIAGNOSIS — I42.9 CARDIOMYOPATHY, UNSPECIFIED: ICD-10-CM

## 2025-07-10 DIAGNOSIS — I48.0 PAF (PAROXYSMAL ATRIAL FIBRILLATION) (MULTI): ICD-10-CM

## 2025-07-10 DIAGNOSIS — R06.02 SHORTNESS OF BREATH: ICD-10-CM

## 2025-07-10 DIAGNOSIS — K81.9 CHOLECYSTITIS: Primary | ICD-10-CM

## 2025-07-10 DIAGNOSIS — E87.6 HYPOKALEMIA: ICD-10-CM

## 2025-07-10 LAB
ALBUMIN SERPL BCP-MCNC: 4.4 G/DL (ref 3.4–5)
ALP SERPL-CCNC: 68 U/L (ref 33–136)
ALT SERPL W P-5'-P-CCNC: 20 U/L (ref 10–52)
ANION GAP SERPL CALC-SCNC: 14 MMOL/L (ref 10–20)
AST SERPL W P-5'-P-CCNC: 18 U/L (ref 9–39)
BASOPHILS # BLD AUTO: 0.05 X10*3/UL (ref 0–0.1)
BASOPHILS NFR BLD AUTO: 0.3 %
BILIRUB DIRECT SERPL-MCNC: 0.3 MG/DL (ref 0–0.3)
BILIRUB SERPL-MCNC: 1.8 MG/DL (ref 0–1.2)
BUN SERPL-MCNC: 15 MG/DL (ref 6–23)
CALCIUM SERPL-MCNC: 9.5 MG/DL (ref 8.6–10.3)
CARDIAC TROPONIN I PNL SERPL HS: 49 NG/L (ref 0–20)
CARDIAC TROPONIN I PNL SERPL HS: 58 NG/L (ref 0–20)
CHLORIDE SERPL-SCNC: 96 MMOL/L (ref 98–107)
CO2 SERPL-SCNC: 29 MMOL/L (ref 21–32)
CREAT SERPL-MCNC: 0.95 MG/DL (ref 0.5–1.3)
EGFRCR SERPLBLD CKD-EPI 2021: 80 ML/MIN/1.73M*2
EOSINOPHIL # BLD AUTO: 0.02 X10*3/UL (ref 0–0.4)
EOSINOPHIL NFR BLD AUTO: 0.1 %
ERYTHROCYTE [DISTWIDTH] IN BLOOD BY AUTOMATED COUNT: 13.7 % (ref 11.5–14.5)
GLUCOSE SERPL-MCNC: 138 MG/DL (ref 74–99)
HCT VFR BLD AUTO: 41.8 % (ref 41–52)
HGB BLD-MCNC: 14.4 G/DL (ref 13.5–17.5)
IMM GRANULOCYTES # BLD AUTO: 0.06 X10*3/UL (ref 0–0.5)
IMM GRANULOCYTES NFR BLD AUTO: 0.4 % (ref 0–0.9)
LIPASE SERPL-CCNC: 11 U/L (ref 9–82)
LYMPHOCYTES # BLD AUTO: 1.7 X10*3/UL (ref 0.8–3)
LYMPHOCYTES NFR BLD AUTO: 10 %
MCH RBC QN AUTO: 31.9 PG (ref 26–34)
MCHC RBC AUTO-ENTMCNC: 34.4 G/DL (ref 32–36)
MCV RBC AUTO: 93 FL (ref 80–100)
MONOCYTES # BLD AUTO: 1.97 X10*3/UL (ref 0.05–0.8)
MONOCYTES NFR BLD AUTO: 11.6 %
NEUTROPHILS # BLD AUTO: 13.23 X10*3/UL (ref 1.6–5.5)
NEUTROPHILS NFR BLD AUTO: 77.6 %
NRBC BLD-RTO: 0 /100 WBCS (ref 0–0)
PLATELET # BLD AUTO: 209 X10*3/UL (ref 150–450)
POTASSIUM SERPL-SCNC: 3.6 MMOL/L (ref 3.5–5.3)
PROT SERPL-MCNC: 7.4 G/DL (ref 6.4–8.2)
RBC # BLD AUTO: 4.52 X10*6/UL (ref 4.5–5.9)
SODIUM SERPL-SCNC: 135 MMOL/L (ref 136–145)
WBC # BLD AUTO: 17 X10*3/UL (ref 4.4–11.3)

## 2025-07-10 PROCEDURE — 80053 COMPREHEN METABOLIC PANEL: CPT | Performed by: NURSE PRACTITIONER

## 2025-07-10 PROCEDURE — 36415 COLL VENOUS BLD VENIPUNCTURE: CPT | Performed by: NURSE PRACTITIONER

## 2025-07-10 PROCEDURE — 3430000001 HC RX 343 DIAGNOSTIC RADIOPHARMACEUTICALS: Performed by: STUDENT IN AN ORGANIZED HEALTH CARE EDUCATION/TRAINING PROGRAM

## 2025-07-10 PROCEDURE — 82248 BILIRUBIN DIRECT: CPT

## 2025-07-10 PROCEDURE — 78227 HEPATOBIL SYST IMAGE W/DRUG: CPT

## 2025-07-10 PROCEDURE — 71046 X-RAY EXAM CHEST 2 VIEWS: CPT | Performed by: RADIOLOGY

## 2025-07-10 PROCEDURE — A9537 TC99M MEBROFENIN: HCPCS | Performed by: STUDENT IN AN ORGANIZED HEALTH CARE EDUCATION/TRAINING PROGRAM

## 2025-07-10 PROCEDURE — 74177 CT ABD & PELVIS W/CONTRAST: CPT | Mod: FOREIGN READ | Performed by: RADIOLOGY

## 2025-07-10 PROCEDURE — 99222 1ST HOSP IP/OBS MODERATE 55: CPT | Performed by: SURGERY

## 2025-07-10 PROCEDURE — 99223 1ST HOSP IP/OBS HIGH 75: CPT | Performed by: NURSE PRACTITIONER

## 2025-07-10 PROCEDURE — 78226 HEPATOBILIARY SYSTEM IMAGING: CPT

## 2025-07-10 PROCEDURE — 2550000001 HC RX 255 CONTRASTS: Performed by: NURSE PRACTITIONER

## 2025-07-10 PROCEDURE — 93005 ELECTROCARDIOGRAM TRACING: CPT

## 2025-07-10 PROCEDURE — 96365 THER/PROPH/DIAG IV INF INIT: CPT

## 2025-07-10 PROCEDURE — 84484 ASSAY OF TROPONIN QUANT: CPT | Performed by: NURSE PRACTITIONER

## 2025-07-10 PROCEDURE — 71046 X-RAY EXAM CHEST 2 VIEWS: CPT

## 2025-07-10 PROCEDURE — 96375 TX/PRO/DX INJ NEW DRUG ADDON: CPT

## 2025-07-10 PROCEDURE — 76705 ECHO EXAM OF ABDOMEN: CPT | Mod: FOREIGN READ | Performed by: RADIOLOGY

## 2025-07-10 PROCEDURE — 74177 CT ABD & PELVIS W/CONTRAST: CPT

## 2025-07-10 PROCEDURE — 99285 EMERGENCY DEPT VISIT HI MDM: CPT | Mod: 25 | Performed by: STUDENT IN AN ORGANIZED HEALTH CARE EDUCATION/TRAINING PROGRAM

## 2025-07-10 PROCEDURE — 2500000004 HC RX 250 GENERAL PHARMACY W/ HCPCS (ALT 636 FOR OP/ED): Performed by: NURSE PRACTITIONER

## 2025-07-10 PROCEDURE — 83690 ASSAY OF LIPASE: CPT | Performed by: NURSE PRACTITIONER

## 2025-07-10 PROCEDURE — 85025 COMPLETE CBC W/AUTO DIFF WBC: CPT | Performed by: NURSE PRACTITIONER

## 2025-07-10 PROCEDURE — 76705 ECHO EXAM OF ABDOMEN: CPT

## 2025-07-10 PROCEDURE — 1200000002 HC GENERAL ROOM WITH TELEMETRY DAILY

## 2025-07-10 PROCEDURE — 2500000001 HC RX 250 WO HCPCS SELF ADMINISTERED DRUGS (ALT 637 FOR MEDICARE OP): Performed by: NURSE PRACTITIONER

## 2025-07-10 RX ORDER — ONDANSETRON HYDROCHLORIDE 2 MG/ML
4 INJECTION, SOLUTION INTRAVENOUS ONCE
Status: COMPLETED | OUTPATIENT
Start: 2025-07-10 | End: 2025-07-10

## 2025-07-10 RX ORDER — ACETAMINOPHEN 500 MG
125 TABLET ORAL DAILY
Status: DISCONTINUED | OUTPATIENT
Start: 2025-07-10 | End: 2025-07-16 | Stop reason: HOSPADM

## 2025-07-10 RX ORDER — METOPROLOL SUCCINATE 25 MG/1
25 TABLET, EXTENDED RELEASE ORAL DAILY
Status: DISCONTINUED | OUTPATIENT
Start: 2025-07-10 | End: 2025-07-11

## 2025-07-10 RX ORDER — BISACODYL 10 MG/1
10 SUPPOSITORY RECTAL DAILY PRN
Status: DISCONTINUED | OUTPATIENT
Start: 2025-07-10 | End: 2025-07-16 | Stop reason: HOSPADM

## 2025-07-10 RX ORDER — PANTOPRAZOLE SODIUM 40 MG/10ML
40 INJECTION, POWDER, LYOPHILIZED, FOR SOLUTION INTRAVENOUS
Status: DISCONTINUED | OUTPATIENT
Start: 2025-07-11 | End: 2025-07-16 | Stop reason: HOSPADM

## 2025-07-10 RX ORDER — KIT FOR THE PREPARATION OF TECHNETIUM TC 99M MEBROFENIN 45 MG/10ML
5.5 INJECTION, POWDER, LYOPHILIZED, FOR SOLUTION INTRAVENOUS
Status: COMPLETED | OUTPATIENT
Start: 2025-07-10 | End: 2025-07-10

## 2025-07-10 RX ORDER — ONDANSETRON 4 MG/1
4 TABLET, FILM COATED ORAL EVERY 8 HOURS PRN
Status: DISCONTINUED | OUTPATIENT
Start: 2025-07-10 | End: 2025-07-16 | Stop reason: HOSPADM

## 2025-07-10 RX ORDER — BISACODYL 5 MG
10 TABLET, DELAYED RELEASE (ENTERIC COATED) ORAL DAILY PRN
Status: DISCONTINUED | OUTPATIENT
Start: 2025-07-10 | End: 2025-07-16 | Stop reason: HOSPADM

## 2025-07-10 RX ORDER — NAPROXEN 500 MG/1
500 TABLET ORAL
Status: ON HOLD | COMMUNITY
End: 2025-07-10 | Stop reason: ENTERED-IN-ERROR

## 2025-07-10 RX ORDER — GUAIFENESIN 600 MG/1
600 TABLET, EXTENDED RELEASE ORAL EVERY 12 HOURS PRN
Status: DISCONTINUED | OUTPATIENT
Start: 2025-07-10 | End: 2025-07-16 | Stop reason: HOSPADM

## 2025-07-10 RX ORDER — ATORVASTATIN CALCIUM 40 MG/1
80 TABLET, FILM COATED ORAL NIGHTLY
Status: DISCONTINUED | OUTPATIENT
Start: 2025-07-10 | End: 2025-07-16 | Stop reason: HOSPADM

## 2025-07-10 RX ORDER — ASPIRIN 81 MG/1
81 TABLET ORAL DAILY
Status: DISCONTINUED | OUTPATIENT
Start: 2025-07-10 | End: 2025-07-16 | Stop reason: HOSPADM

## 2025-07-10 RX ORDER — CHLORTHALIDONE 25 MG/1
25 TABLET ORAL DAILY
Status: DISCONTINUED | OUTPATIENT
Start: 2025-07-10 | End: 2025-07-16 | Stop reason: HOSPADM

## 2025-07-10 RX ORDER — HEPARIN SODIUM 5000 [USP'U]/ML
5000 INJECTION, SOLUTION INTRAVENOUS; SUBCUTANEOUS EVERY 8 HOURS
Status: DISCONTINUED | OUTPATIENT
Start: 2025-07-10 | End: 2025-07-11

## 2025-07-10 RX ORDER — ONDANSETRON HYDROCHLORIDE 2 MG/ML
4 INJECTION, SOLUTION INTRAVENOUS EVERY 8 HOURS PRN
Status: DISCONTINUED | OUTPATIENT
Start: 2025-07-10 | End: 2025-07-16 | Stop reason: HOSPADM

## 2025-07-10 RX ORDER — EZETIMIBE 10 MG/1
10 TABLET ORAL DAILY
Status: DISCONTINUED | OUTPATIENT
Start: 2025-07-10 | End: 2025-07-16 | Stop reason: HOSPADM

## 2025-07-10 RX ORDER — MORPHINE SULFATE 4 MG/ML
4 INJECTION INTRAVENOUS EVERY 4 HOURS PRN
Status: DISCONTINUED | OUTPATIENT
Start: 2025-07-10 | End: 2025-07-16 | Stop reason: HOSPADM

## 2025-07-10 RX ORDER — PANTOPRAZOLE SODIUM 40 MG/1
40 TABLET, DELAYED RELEASE ORAL
Status: DISCONTINUED | OUTPATIENT
Start: 2025-07-11 | End: 2025-07-16 | Stop reason: HOSPADM

## 2025-07-10 RX ORDER — TALC
3 POWDER (GRAM) TOPICAL NIGHTLY PRN
Status: DISCONTINUED | OUTPATIENT
Start: 2025-07-10 | End: 2025-07-16 | Stop reason: HOSPADM

## 2025-07-10 RX ORDER — AMLODIPINE BESYLATE 10 MG/1
10 TABLET ORAL DAILY
Status: DISCONTINUED | OUTPATIENT
Start: 2025-07-10 | End: 2025-07-16 | Stop reason: HOSPADM

## 2025-07-10 RX ORDER — POLYETHYLENE GLYCOL 3350 17 G/17G
17 POWDER, FOR SOLUTION ORAL DAILY
Status: DISCONTINUED | OUTPATIENT
Start: 2025-07-10 | End: 2025-07-16 | Stop reason: HOSPADM

## 2025-07-10 RX ORDER — CYCLOBENZAPRINE HCL 10 MG
TABLET ORAL 2 TIMES DAILY PRN
Status: ON HOLD | COMMUNITY
End: 2025-07-10 | Stop reason: ENTERED-IN-ERROR

## 2025-07-10 RX ADMIN — PIPERACILLIN SODIUM AND TAZOBACTAM SODIUM 3.38 G: 3; .375 INJECTION, SOLUTION INTRAVENOUS at 18:03

## 2025-07-10 RX ADMIN — ATORVASTATIN CALCIUM 80 MG: 40 TABLET, FILM COATED ORAL at 20:57

## 2025-07-10 RX ADMIN — IOHEXOL 75 ML: 350 INJECTION, SOLUTION INTRAVENOUS at 10:25

## 2025-07-10 RX ADMIN — ONDANSETRON 4 MG: 2 INJECTION, SOLUTION INTRAMUSCULAR; INTRAVENOUS at 11:01

## 2025-07-10 RX ADMIN — PIPERACILLIN SODIUM AND TAZOBACTAM SODIUM 3.38 G: 3; .375 INJECTION, SOLUTION INTRAVENOUS at 12:48

## 2025-07-10 RX ADMIN — KIT FOR THE PREPARATION OF TECHNETIUM TC 99M MEBROFENIN 5.5 MILLICURIE: 45 INJECTION, POWDER, LYOPHILIZED, FOR SOLUTION INTRAVENOUS at 14:05

## 2025-07-10 SDOH — SOCIAL STABILITY: SOCIAL INSECURITY: WITHIN THE LAST YEAR, HAVE YOU BEEN AFRAID OF YOUR PARTNER OR EX-PARTNER?: NO

## 2025-07-10 SDOH — HEALTH STABILITY: MENTAL HEALTH
DO YOU FEEL STRESS - TENSE, RESTLESS, NERVOUS, OR ANXIOUS, OR UNABLE TO SLEEP AT NIGHT BECAUSE YOUR MIND IS TROUBLED ALL THE TIME - THESE DAYS?: NOT AT ALL

## 2025-07-10 SDOH — SOCIAL STABILITY: SOCIAL INSECURITY: ARE YOU MARRIED, WIDOWED, DIVORCED, SEPARATED, NEVER MARRIED, OR LIVING WITH A PARTNER?: PATIENT DECLINED

## 2025-07-10 SDOH — ECONOMIC STABILITY: HOUSING INSECURITY: IN THE LAST 12 MONTHS, WAS THERE A TIME WHEN YOU WERE NOT ABLE TO PAY THE MORTGAGE OR RENT ON TIME?: NO

## 2025-07-10 SDOH — ECONOMIC STABILITY: HOUSING INSECURITY: IN THE PAST 12 MONTHS, HOW MANY TIMES HAVE YOU MOVED WHERE YOU WERE LIVING?: 0

## 2025-07-10 SDOH — ECONOMIC STABILITY: HOUSING INSECURITY: AT ANY TIME IN THE PAST 12 MONTHS, WERE YOU HOMELESS OR LIVING IN A SHELTER (INCLUDING NOW)?: NO

## 2025-07-10 SDOH — ECONOMIC STABILITY: FOOD INSECURITY: HOW HARD IS IT FOR YOU TO PAY FOR THE VERY BASICS LIKE FOOD, HOUSING, MEDICAL CARE, AND HEATING?: NOT HARD AT ALL

## 2025-07-10 SDOH — SOCIAL STABILITY: SOCIAL NETWORK: HOW OFTEN DO YOU ATTEND MEETINGS OF THE CLUBS OR ORGANIZATIONS YOU BELONG TO?: PATIENT DECLINED

## 2025-07-10 SDOH — ECONOMIC STABILITY: FOOD INSECURITY: WITHIN THE PAST 12 MONTHS, YOU WORRIED THAT YOUR FOOD WOULD RUN OUT BEFORE YOU GOT THE MONEY TO BUY MORE.: NEVER TRUE

## 2025-07-10 SDOH — ECONOMIC STABILITY: FOOD INSECURITY: WITHIN THE PAST 12 MONTHS, THE FOOD YOU BOUGHT JUST DIDN'T LAST AND YOU DIDN'T HAVE MONEY TO GET MORE.: NEVER TRUE

## 2025-07-10 SDOH — SOCIAL STABILITY: SOCIAL NETWORK: HOW OFTEN DO YOU ATTEND CHURCH OR RELIGIOUS SERVICES?: PATIENT DECLINED

## 2025-07-10 SDOH — SOCIAL STABILITY: SOCIAL INSECURITY
WITHIN THE LAST YEAR, HAVE YOU BEEN KICKED, HIT, SLAPPED, OR OTHERWISE PHYSICALLY HURT BY YOUR PARTNER OR EX-PARTNER?: NO

## 2025-07-10 SDOH — SOCIAL STABILITY: SOCIAL INSECURITY: WITHIN THE LAST YEAR, HAVE YOU BEEN HUMILIATED OR EMOTIONALLY ABUSED IN OTHER WAYS BY YOUR PARTNER OR EX-PARTNER?: NO

## 2025-07-10 SDOH — ECONOMIC STABILITY: INCOME INSECURITY: IN THE PAST 12 MONTHS HAS THE ELECTRIC, GAS, OIL, OR WATER COMPANY THREATENED TO SHUT OFF SERVICES IN YOUR HOME?: NO

## 2025-07-10 SDOH — HEALTH STABILITY: PHYSICAL HEALTH: ON AVERAGE, HOW MANY DAYS PER WEEK DO YOU ENGAGE IN MODERATE TO STRENUOUS EXERCISE (LIKE A BRISK WALK)?: 2 DAYS

## 2025-07-10 SDOH — SOCIAL STABILITY: SOCIAL NETWORK: HOW OFTEN DO YOU GET TOGETHER WITH FRIENDS OR RELATIVES?: THREE TIMES A WEEK

## 2025-07-10 SDOH — SOCIAL STABILITY: SOCIAL INSECURITY
WITHIN THE LAST YEAR, HAVE YOU BEEN RAPED OR FORCED TO HAVE ANY KIND OF SEXUAL ACTIVITY BY YOUR PARTNER OR EX-PARTNER?: NO

## 2025-07-10 SDOH — SOCIAL STABILITY: SOCIAL NETWORK
DO YOU BELONG TO ANY CLUBS OR ORGANIZATIONS SUCH AS CHURCH GROUPS, UNIONS, FRATERNAL OR ATHLETIC GROUPS, OR SCHOOL GROUPS?: PATIENT DECLINED

## 2025-07-10 SDOH — SOCIAL STABILITY: SOCIAL NETWORK: IN A TYPICAL WEEK, HOW MANY TIMES DO YOU TALK ON THE PHONE WITH FAMILY, FRIENDS, OR NEIGHBORS?: THREE TIMES A WEEK

## 2025-07-10 SDOH — HEALTH STABILITY: PHYSICAL HEALTH
HOW OFTEN DO YOU NEED TO HAVE SOMEONE HELP YOU WHEN YOU READ INSTRUCTIONS, PAMPHLETS, OR OTHER WRITTEN MATERIAL FROM YOUR DOCTOR OR PHARMACY?: NEVER

## 2025-07-10 SDOH — SOCIAL STABILITY: SOCIAL INSECURITY: WERE YOU ABLE TO COMPLETE ALL THE BEHAVIORAL HEALTH SCREENINGS?: YES

## 2025-07-10 SDOH — HEALTH STABILITY: PHYSICAL HEALTH: ON AVERAGE, HOW MANY MINUTES DO YOU ENGAGE IN EXERCISE AT THIS LEVEL?: 20 MIN

## 2025-07-10 SDOH — ECONOMIC STABILITY: TRANSPORTATION INSECURITY: IN THE PAST 12 MONTHS, HAS LACK OF TRANSPORTATION KEPT YOU FROM MEDICAL APPOINTMENTS OR FROM GETTING MEDICATIONS?: NO

## 2025-07-10 SDOH — SOCIAL STABILITY: SOCIAL INSECURITY: HAVE YOU HAD THOUGHTS OF HARMING ANYONE ELSE?: NO

## 2025-07-10 ASSESSMENT — ACTIVITIES OF DAILY LIVING (ADL)
ASSISTIVE_DEVICE: DENTURES UPPER
LACK_OF_TRANSPORTATION: NO
DRESSING YOURSELF: INDEPENDENT
HEARING - RIGHT EAR: DIFFICULTY WITH NOISE
PATIENT'S MEMORY ADEQUATE TO SAFELY COMPLETE DAILY ACTIVITIES?: YES
ADEQUATE_TO_COMPLETE_ADL: YES
GROOMING: INDEPENDENT
BATHING: INDEPENDENT
LACK_OF_TRANSPORTATION: NO
JUDGMENT_ADEQUATE_SAFELY_COMPLETE_DAILY_ACTIVITIES: YES
HEARING - LEFT EAR: DIFFICULTY WITH NOISE
TOILETING: INDEPENDENT
FEEDING YOURSELF: INDEPENDENT
LACK_OF_TRANSPORTATION: NO
WALKS IN HOME: INDEPENDENT

## 2025-07-10 ASSESSMENT — COGNITIVE AND FUNCTIONAL STATUS - GENERAL
DAILY ACTIVITIY SCORE: 24
DAILY ACTIVITIY SCORE: 24
MOBILITY SCORE: 24
PATIENT BASELINE BEDBOUND: NO
MOBILITY SCORE: 24

## 2025-07-10 ASSESSMENT — PAIN SCALES - GENERAL
PAINLEVEL_OUTOF10: 0 - NO PAIN
PAINLEVEL_OUTOF10: 0 - NO PAIN
PAINLEVEL_OUTOF10: 7

## 2025-07-10 ASSESSMENT — PAIN - FUNCTIONAL ASSESSMENT
PAIN_FUNCTIONAL_ASSESSMENT: 0-10

## 2025-07-10 ASSESSMENT — LIFESTYLE VARIABLES
HOW MANY STANDARD DRINKS CONTAINING ALCOHOL DO YOU HAVE ON A TYPICAL DAY: PATIENT DOES NOT DRINK
AUDIT-C TOTAL SCORE: 0
TOTAL SCORE: 0
EVER HAD A DRINK FIRST THING IN THE MORNING TO STEADY YOUR NERVES TO GET RID OF A HANGOVER: NO
AUDIT-C TOTAL SCORE: 0
HAVE YOU EVER FELT YOU SHOULD CUT DOWN ON YOUR DRINKING: NO
HOW OFTEN DO YOU HAVE A DRINK CONTAINING ALCOHOL: NEVER
PRESCIPTION_ABUSE_PAST_12_MONTHS: NO
SUBSTANCE_ABUSE_PAST_12_MONTHS: NO
EVER FELT BAD OR GUILTY ABOUT YOUR DRINKING: NO
SKIP TO QUESTIONS 9-10: 1
HOW OFTEN DO YOU HAVE 6 OR MORE DRINKS ON ONE OCCASION: NEVER
HAVE PEOPLE ANNOYED YOU BY CRITICIZING YOUR DRINKING: NO

## 2025-07-10 ASSESSMENT — PAIN DESCRIPTION - PAIN TYPE: TYPE: ACUTE PAIN

## 2025-07-10 ASSESSMENT — PATIENT HEALTH QUESTIONNAIRE - PHQ9
2. FEELING DOWN, DEPRESSED OR HOPELESS: NOT AT ALL
SUM OF ALL RESPONSES TO PHQ9 QUESTIONS 1 & 2: 0
1. LITTLE INTEREST OR PLEASURE IN DOING THINGS: NOT AT ALL

## 2025-07-10 ASSESSMENT — HEART SCORE
HEART SCORE: 5
HISTORY: SLIGHTLY SUSPICIOUS
AGE: 65+
RISK FACTORS: >2 RISK FACTORS OR HX OF ATHEROSCLEROTIC DISEASE
ECG: NORMAL
TROPONIN: 1-3 TIMES NORMAL LIMIT

## 2025-07-10 ASSESSMENT — PAIN DESCRIPTION - ORIENTATION: ORIENTATION: RIGHT

## 2025-07-10 ASSESSMENT — PAIN DESCRIPTION - FREQUENCY: FREQUENCY: CONSTANT/CONTINUOUS

## 2025-07-10 ASSESSMENT — PAIN DESCRIPTION - LOCATION: LOCATION: ABDOMEN

## 2025-07-10 ASSESSMENT — PAIN DESCRIPTION - DESCRIPTORS: DESCRIPTORS: OTHER (COMMENT)

## 2025-07-10 NOTE — NURSING NOTE
Received from ER, alert and oriented, name band verified, alert and oriented, independent in room, steady on feet, denies pain and nausea at present, instructed to call for assistance, verbalizes understanding

## 2025-07-10 NOTE — PROGRESS NOTES
Neftali Schmitt is a 80 y.o. male admitted for Cholecystitis. Pharmacy reviewed the patient's [id number] medications and allergies for accuracy.    The list below reflects the PTA list prior to pharmacy medication history. A summary a changes to the PTA medication list has been listed below. Please review each medication in order reconciliation for additional clarification and justification.    Source of information:  [facility]    Medications added:  NAPROXEN 500 1 BID FILL DATE (2018) PRN  CYCLOBENZAPRINE  10MG  1 BID PRN (2018)    Medications modified:    Medications to be removed:    Medications of concern:      Prior to Admission Medications   Prescriptions Last Dose Informant Patient Reported? Taking?   amLODIPine (Norvasc) 10 mg tablet   No No   Sig: Take 1 tablet (10 mg) by mouth once daily.   aspirin 81 mg EC tablet   Yes No   atorvastatin (Lipitor) 80 mg tablet   No No   Sig: Take 1 tablet (80 mg) by mouth once daily.   chlorthalidone (Hygroton) 25 mg tablet   No No   Sig: Take 1 tablet (25 mg) by mouth once daily.   cholecalciferol (Vitamin D3) 5,000 Units tablet   Yes No   Sig: Take 1 tablet (5,000 Units) by mouth once daily.   ezetimibe (Zetia) 10 mg tablet   No No   Sig: Take 1 tablet (10 mg) by mouth once daily.   meclizine (Antivert) 25 mg tablet   No No   Sig: Take 1 tablet (25 mg) by mouth 3 times a day as needed for dizziness.   metoprolol succinate XL (Toprol-XL) 25 mg 24 hr tablet   No No   Sig: Take 1 tablet (25 mg) by mouth once daily.   pantoprazole (ProtoNix) 40 mg EC tablet   No No   Sig: Take 1 tablet (40 mg) by mouth once daily. Do not crush, chew, or split.      Facility-Administered Medications: None       Deborah Calderon

## 2025-07-10 NOTE — Clinical Note
Single view of the saphenous vein graft to the posterior descending artery obtained using hand injection.

## 2025-07-10 NOTE — CONSULTS
Reason For Consult  RUQ pain c/f acute cholecystitis     History Of Present Illness  Neftali Schmitt is a 80 y.o. male with PMH of CAD s/p bypass, HTN, HLD, GERD, T2DM controlled with diet, presenting with acute onset of epigastric pain wrapping around his right lower rib which started at 2 am. The pain woke him from sleep and he reports never having pain like this before. He reports regular epigastric pain related to GERD daily, but not the radiation of the pain wrapping around his right abdomen. He denies any pain associated with eating and has not had nausea or vomiting. He has had less flatus this AM than usual and not had a bowel movement since yesterday. He denies fever, chills, chest pain, SOB, nausea, and dysuria.      Past Medical History  He has a past medical history of Abnormal auditory perception of both ears (02/09/2023), GERD (gastroesophageal reflux disease), Hyperlipidemia, Hypertension, Immunization not carried out because of patient refusal, Personal history of other diseases of the circulatory system, Personal history of other diseases of the musculoskeletal system and connective tissue, Personal history of other drug therapy, and Severe obesity (BMI 35.0-39.9) with comorbidity (Multi) (09/26/2023).    Surgical History  He has a past surgical history that includes Other surgical history (07/17/2020) and CT angio neck (2/21/2023).     Social History  He reports that he quit smoking about 35 years ago. His smoking use included cigarettes. He has been exposed to tobacco smoke. He has never used smokeless tobacco. He reports current alcohol use of about 2.0 standard drinks of alcohol per week. He reports that he does not use drugs.    Family History  Family History[1]     Allergies  Losartan    Review of Systems  ROS negative unless noted on HPI above      Physical Exam  General: NAD, awake and alert, conversive  HEENT: Normocephalic, atraumatic  Neuro: A&O x 3, grossly intact  Cardio: Regular  "rate  Respiratory: Unlabored breathing on RA  Abdomen: Soft, nondistended, mild focal tenderness in RUQ without rebound or guarding, Negative Rosen's Sign  Extremities: Normal extremities, no edema or cyanosis  Skin: Warm and dry     Last Recorded Vitals  Blood pressure 150/80, pulse 78, temperature 36.2 °C (97.1 °F), temperature source Temporal, resp. rate 20, height 1.676 m (5' 6\"), weight 90.7 kg (200 lb), SpO2 98%.    Relevant Results  US gallbladder  Result Date: 7/10/2025  STUDY: Right Upper Quadrant Ultrasound; 7/10/2025 11:56 AM INDICATION: RUQ pain. COMPARISON: CT A/P 7/10/2025. ACCESSION NUMBER(S): [id number] ORDERING CLINICIAN: MARIELOS LOYA TECHNIQUE: Ultrasound of the Right Upper Quadrant. FINDINGS: LIVER: The liver demonstrates increased echogenicity.   GALLBLADDER: The gallbladder contains sludge.  There are no stones.  There is no pericholecystic fluid or wall thickening.  Sonographic Rosen's sign is negative.  BILE DUCTS: The common bile duct measures 0.4 cm.  There is no intrahepatic biliary dilatation.   PANCREAS: The pancreas is not seen due to overlying bowel gas.  RIGHT KIDNEY: The right kidney measures 12.3 cm in length.  Renal cortical echotexture is increased.  There is no hydronephrosis.  There are no stones.  There are multiple cysts with the largest located within the upper pole measuring 5.3 x 2.6 x 4.1 cm.    Gallbladder sludge with borderline gallbladder wall thickening.  No cholelithiasis, gallbladder wall thickening, or biliary dilatation is appreciated.  Given the findings on CT, consider further evaluation with a HIDA scan. Increased echogenicity of the right renal cortex consistent with intrinsic renal disease.  No hydronephrosis. Right renal cyst measuring 5.3 x 2.6 x 4.1 cm. Diffuse hepatic steatosis. Signed by Juan Antonio Rodarte MD    CT abdomen pelvis w IV contrast  Result Date: 7/10/2025  STUDY: CT Abdomen and Pelvis with IV Contrast; 7/10/2025 10:39 AM. INDICATION: Right " upper quadrant abdominal pain. COMPARISON: None Available. ACCESSION NUMBER(S): [id number] ORDERING CLINICIAN: MARIELOS LOYA TECHNIQUE: CT of the abdomen and pelvis was performed.  Contiguous axial images were obtained at 3 mm slice thickness through the abdomen and pelvis. Coronal and sagittal reconstructions at 3 mm slice thickness were performed.  Omnipaque 350, 75 mL was administered intravenously.  FINDINGS: LOWER CHEST: No cardiomegaly.  No pericardial effusion.  Lung bases are clear.  ABDOMEN:  LIVER: No hepatomegaly.  Smooth surface contour.  Mild fatty infiltration of the liver.  Small left hepatic lobe cyst.  BILE DUCTS: Mild biliary dilatation with the common bile duct measuring up to 9 to 10 mm without definite choledocholithiasis  GALLBLADDER: The gallbladder is distended with questionable underlying gallstones, gallbladder wall thickening and mild pericholecystic haziness.. STOMACH: No abnormalities identified.  PANCREAS: No masses or ductal dilatation.  SPLEEN: No splenomegaly or focal splenic lesion.  ADRENAL GLANDS: No thickening or nodules.  KIDNEYS AND URETERS: Kidneys are normal in size and location.  No renal or ureteral calculi.  Bilateral renal cysts measuring up to 4.1 cm on the right.  PELVIS:  BLADDER: No abnormalities identified.  REPRODUCTIVE ORGANS: No abnormalities identified.  BOWEL: No abnormalities identified.  Appendix is normal.  VESSELS: No abnormalities identified.  Abdominal aorta is normal in caliber.  PERITONEUM/RETROPERITONEUM/LYMPH NODES: No free fluid.  No pneumoperitoneum. No lymphadenopathy.  ABDOMINAL WALL: No abnormalities identified. SOFT TISSUES: No abnormalities identified.  BONES: No acute fracture or aggressive osseous lesion.    1.Distended gallbladder with questionable underlying gallstones, gallbladder wall thickening and mild pericholecystic haziness. Findings suggestive of acute cholecystitis.  Recommend further evaluation with right upper quadrant  ultrasound. 2.Mild biliary dilatation with the common bile duct measuring up to 9 to 10 mm without definite choledocholithiasis. Signed by Juan Antonio Rodarte MD    XR chest 2 views  Result Date: 7/10/2025  Interpreted By:  Lewis Chang, STUDY: XR CHEST 2 VIEWS;  7/10/2025 9:51 am   INDICATION: Signs/Symptoms:RUQ pain.   COMPARISON: 06/10/2025   ACCESSION NUMBER(S): [id number]   ORDERING CLINICIAN: MARIELOS LOYA   FINDINGS: Post sternotomy. The lungs are clear. Opacity in the right posterior costophrenic angle may be slightly increased. Unremarkable cardiomediastinal silhouette. No pulmonary vascular congestion. Thoracic degenerative changes are present.       Opacity in the right posterior costophrenic angle appears increased, possibly small enlarging pleural effusion or focal consolidation. Suggest radiographic follow-up to resolution.   MACRO: None   Signed by: Lewis Chang 7/10/2025 10:02 AM Dictation workstation:   [id number]         Assessment/Plan     Mr. Neftali Schmitt is an 81 y/o male presenting with acute cholecystitis with leukocytosis to 17. Patient is admitted to medicine service with a now downtrending troponinemia and in the setting of CAD with previous CABG requires further cardiac workup.    Plan:  - Admitted to IMS service  - Echo pending  - Cardiology consult pending - appreciate recommendations  - Plan for percutaneous cholecystostomy tube placement on 7/11 - consult placed  - NPO at [facility] - fluids per medicine service  - Continue IV Zosyn  - Bowel regimen  - General surgery to follow    Patient discussed with attending Dr. Adolfo Bah MD  PGY 3 General Surgery          [1]   Family History  Problem Relation Name Age of Onset    Cancer Mother      Cancer Father

## 2025-07-10 NOTE — ED NOTES
Pt arrives to ED via car from home with c/o upper abdominal pain and nausea.    Code Status:  No Order    HPI     Chief Complaint   Patient presents with    Abdominal Pain     RUQ pain radiating from back towards belly button. X3 days. Denies N/V/D. Took laxative this AM for constipation. Recently started on new meds for GERD       /80 (BP Location: Left arm, Patient Position: Sitting)   Pulse 78   Temp 36.2 °C (97.1 °F) (Temporal)   Resp 20   Wt 90.7 kg (200 lb)   SpO2 98%     [place] Coma Scale Score: 15      LDA:   Peripheral IV 07/10/25 20 G Anterior;Right;Upper Arm (Active)   Placement Date/Time: 07/10/25 0839   Hand Hygiene Completed: Yes  Size (Gauge): 20 G  Orientation: Anterior;Right;Upper  Location: Arm  Site Prep: Chlorhexidine    Number of days: 0        BACKGROUND  Medical History[1]  Surgical History[2]  Medications Ordered Prior to Encounter[3]     ASSESSMENT       Medications Currently Running:  Continuous Medications[4]     Medications Given:  ED Medication Administration from 07/10/2025 0819 to 07/10/2025 1246         Date/Time Order Dose Route Action Action by     07/10/2025 1025 EDT iohexol (OMNIPaque) 350 mg iodine/mL solution 75 mL 75 mL intravenous Given WILDA Ramos     07/10/2025 1101 EDT ondansetron (Zofran) injection 4 mg 4 mg intravenous Given SKY Banks                 RESULTS    Imaging:  US gallbladder   Final Result   Gallbladder sludge with borderline gallbladder wall thickening.  No   cholelithiasis, gallbladder wall thickening, or biliary dilatation is   appreciated.  Given the findings on CT, consider further evaluation   with a HIDA scan.   Increased echogenicity of the right renal cortex consistent with   intrinsic renal disease.  No hydronephrosis.    Right renal cyst measuring 5.3 x 2.6 x 4.1 cm.   Diffuse hepatic steatosis.   Signed by Juan Antonio Rodarte MD      CT abdomen pelvis w IV contrast   Final Result   1.Distended gallbladder with questionable underlying  gallstones,   gallbladder wall thickening and mild pericholecystic haziness.    Findings suggestive of acute cholecystitis.  Recommend further   evaluation with right upper quadrant ultrasound.   2.Mild biliary dilatation with the common bile duct measuring up   to 9 to 10 mm without definite choledocholithiasis.   Signed by Juan Antonio Rodarte MD      XR chest 2 views   Final Result   Opacity in the right posterior costophrenic angle appears increased,   possibly small enlarging pleural effusion or focal consolidation.   Suggest radiographic follow-up to resolution.        MACRO:   None        Signed by: Lewis Chang 7/10/2025 10:02 AM   Dictation workstation:   [id number]         }  Labs ::99  Abnormal Labs Reviewed   CBC WITH AUTO DIFFERENTIAL - Abnormal; Notable for the following components:       Result Value    WBC 17.0 (*)     Neutrophils Absolute 13.23 (*)     Monocytes Absolute 1.97 (*)     All other components within normal limits   COMPREHENSIVE METABOLIC PANEL - Abnormal; Notable for the following components:    Glucose 138 (*)     Sodium 135 (*)     Chloride 96 (*)     Bilirubin, Total 1.8 (*)     All other components within normal limits   SERIAL TROPONIN-INITIAL - Abnormal; Notable for the following components:    Troponin I, High Sensitivity 58 (*)     All other components within normal limits    Narrative:     Less than 99th percentile of normal range cutoff-                  Female and children under 18 years old <14 ng/L; Male <21 ng/L: Negative                  Repeat testing should be performed if clinically indicated.                                     Female and children under 18 years old 14-50 ng/L; Male 21-50 ng/L:                  Consistent with possible cardiac damage and possible increased clinical                   risk. Serial measurements may help to assess extent of myocardial damage.                                     >50 ng/L: Consistent with cardiac damage, increased clinical risk  and                  myocardial infarction. Serial measurements may help assess extent of                   myocardial damage.                                      NOTE: Children less than 1 year old may have higher baseline troponin                   levels and results should be interpreted in conjunction with the overall                   clinical context.                                     NOTE: Troponin I testing is performed using a different                   testing methodology at [facility] than at other                   [facility]. Direct result comparisons should only                   be made within the same method.   SERIAL TROPONIN, 1 HOUR - Abnormal; Notable for the following components:    Troponin I, High Sensitivity 49 (*)     All other components within normal limits    Narrative:     Less than 99th percentile of normal range cutoff-                  Female and children under 18 years old <14 ng/L; Male <21 ng/L: Negative                  Repeat testing should be performed if clinically indicated.                                     Female and children under 18 years old 14-50 ng/L; Male 21-50 ng/L:                  Consistent with possible cardiac damage and possible increased clinical                   risk. Serial measurements may help to assess extent of myocardial damage.                                     >50 ng/L: Consistent with cardiac damage, increased clinical risk and                  myocardial infarction. Serial measurements may help assess extent of                   myocardial damage.                                      NOTE: Children less than 1 year old may have higher baseline troponin                   levels and results should be interpreted in conjunction with the overall                   clinical context.                                     NOTE: Troponin I testing is performed using a different                   testing methodology at [facility] than at other                   system hospitals. Direct result comparisons should only                   be made within the same method.                   [1]   Past Medical History:  Diagnosis Date    Abnormal auditory perception of both ears 02/09/2023    GERD (gastroesophageal reflux disease)     Hyperlipidemia     Hypertension     Immunization not carried out because of patient refusal     Refused pneumococcal vaccination    Personal history of other diseases of the circulatory system     History of hypertension    Personal history of other diseases of the musculoskeletal system and connective tissue     History of arthritis    Personal history of other drug therapy     COVID-19 vaccine series completed    Severe obesity (BMI 35.0-39.9) with comorbidity (Multi) 09/26/2023   [2]   Past Surgical History:  Procedure Laterality Date    CT ANGIO NECK  2/21/2023    CT NECK ANGIO W AND WO IV CONTRAST POR CT    OTHER SURGICAL HISTORY  07/17/2020    Coronary artery bypass graft   [3]   No current facility-administered medications on file prior to encounter.     Current Outpatient Medications on File Prior to Encounter   Medication Sig Dispense Refill    amLODIPine (Norvasc) 10 mg tablet Take 1 tablet (10 mg) by mouth once daily. 90 tablet 1    aspirin 81 mg EC tablet       atorvastatin (Lipitor) 80 mg tablet Take 1 tablet (80 mg) by mouth once daily. 90 tablet 1    chlorthalidone (Hygroton) 25 mg tablet Take 1 tablet (25 mg) by mouth once daily. 90 tablet 1    cholecalciferol (Vitamin D3) 5,000 Units tablet Take 1 tablet (5,000 Units) by mouth once daily.      ezetimibe (Zetia) 10 mg tablet Take 1 tablet (10 mg) by mouth once daily. 90 tablet 1    meclizine (Antivert) 25 mg tablet Take 1 tablet (25 mg) by mouth 3 times a day as needed for dizziness. 30 tablet 0    metoprolol succinate XL (Toprol-XL) 25 mg 24 hr tablet Take 1 tablet (25 mg) by mouth once daily. 90 tablet 1    pantoprazole (ProtoNix) 40 mg  EC tablet Take 1 tablet (40 mg) by mouth once daily. Do not crush, chew, or split. 30 tablet 1   [4]         Camilla Banks RN  07/10/25 0069

## 2025-07-10 NOTE — H&P
[facility] - GENERAL MEDICINE HISTORY AND PHYSICAL    HISTORY OF PRESENT ILLNESS     History Obtained From (Primary Source): The patient  Collateral History (Secondary Sources): D/w ED    History Of Present Illness (HPI):  Neftali Schmitt is a 80 y.o. male with PMHx s/f GERD coronary artery disease status post CABG borderline diabetes, carotid and vertebral artery disease, hypertension, dyslipidemia presenting with right upper quadrant pain.  Patient frequently has fairly severe GERD symptoms does typically run from his throat down to his epigastrium.  This pain and discomfort he started having this morning was much worse and located differently.  Starting more laterally in the right epigastrium into the middle epigastrium the patient developed sharp pain.  He did have some nausea.  No vomiting.  He denies any fevers but has had some chills for the last couple days.  The patient has not noticed any pain similar to this associated with any meals prior to this.  He is denying any chest pain or shortness of breath no diarrhea or urinary symptoms.  In the emergency department the initial CT scan was suggestive of acute cholecystitis the ultrasound was not suggestive of cholecystitis the ED provider discussed the case with surgery HIDA scan was ordered.  Surgery advised to place the patient on antibiotic therapy for now and if needed patient might need to go for cholecystotomy tube and cholecystectomy at a later time.  In the course of the emergency department workup the patient was also found to have mild troponin elevation which is since downtrending to troponin of 49.      ED Course:   Vitals on presentation: T 36.2 °C (97.1 °F)  HR 73  /72  RR 16  O2 98 % None (Room air)  Labs:   CBC with WBC 17.0, Hgb 14.4, Plts 209.   CMP with glucose 138, Na 135, K 3.6, BUN 15, sCr 0.95, alk phos 68, ALT 20, AST 18, bilirubin 1.8. Magnesium No results found for requested labs within last 365 days..    BNP No results found for requested labs within last 365 days.. Trop 49.   Lactate No results found for requested labs within last 365 days.  EKG: Not available for review  Imaging - agree with radiology interpretation(s):   Interventions: IV Zosyn, morphine and Dilaudid    12-point ROS reviewed and found to be negative aside from aforementioned positives in HPI and/or noted in dedicated ROS section below.     Decision made to admit the patient to the hospitalist service after evaluation of the patient, review of the above, and discussion with ED provider.     LABS AND IMAGING     I have personally reviewed the following labs from 07/10/25: CBC and CMP  I have personally reviewed any obtained EKGs on 07/10/25, with my interpretation as listed above in the ED summary course.   I have personally reviewed the patient's vitals on presentation to the ED and any/all changes through to time of admission (on 07/10/25).     ED Course (From ED Provider):  ED Course as of 07/10/25 1642   Thu Jul 10, 2025   1527 Per Dr. Mata with general surgery, admit patient to medicine and will plan bernard-tube tomorrow with delayed GB surgery. Admitted to [facility] for acute cholecystitis. [JG]      ED Course User Index  [JG] Anna Moran MD         Diagnoses as of 07/10/25 1642   Cholecystitis     Relevant Results  Results for orders placed or performed during the hospital encounter of 07/10/25 (from the past 24 hours)   CBC and Auto Differential   Result Value Ref Range    WBC 17.0 (H) 4.4 - 11.3 x10*3/uL    nRBC 0.0 0.0 - 0.0 /100 WBCs    RBC 4.52 4.50 - 5.90 x10*6/uL    Hemoglobin 14.4 13.5 - 17.5 g/dL    Hematocrit 41.8 41.0 - 52.0 %    MCV 93 80 - 100 fL    MCH 31.9 26.0 - 34.0 pg    MCHC 34.4 32.0 - 36.0 g/dL    RDW 13.7 11.5 - 14.5 %    Platelets 209 150 - 450 x10*3/uL    Neutrophils % 77.6 40.0 - 80.0 %    Immature Granulocytes %, Automated 0.4 0.0 - 0.9 %    Lymphocytes % 10.0 13.0 - 44.0 %    Monocytes % 11.6 2.0 - 10.0 %     Eosinophils % 0.1 0.0 - 6.0 %    Basophils % 0.3 0.0 - 2.0 %    Neutrophils Absolute 13.23 (H) 1.60 - 5.50 x10*3/uL    Immature Granulocytes Absolute, Automated 0.06 0.00 - 0.50 x10*3/uL    Lymphocytes Absolute 1.70 0.80 - 3.00 x10*3/uL    Monocytes Absolute 1.97 (H) 0.05 - 0.80 x10*3/uL    Eosinophils Absolute 0.02 0.00 - 0.40 x10*3/uL    Basophils Absolute 0.05 0.00 - 0.10 x10*3/uL   Comprehensive metabolic panel   Result Value Ref Range    Glucose 138 (H) 74 - 99 mg/dL    Sodium 135 (L) 136 - 145 mmol/L    Potassium 3.6 3.5 - 5.3 mmol/L    Chloride 96 (L) 98 - 107 mmol/L    Bicarbonate 29 21 - 32 mmol/L    Anion Gap 14 10 - 20 mmol/L    Urea Nitrogen 15 6 - 23 mg/dL    Creatinine 0.95 0.50 - 1.30 mg/dL    eGFR 80 >60 mL/min/1.73m*2    Calcium 9.5 8.6 - 10.3 mg/dL    Albumin 4.4 3.4 - 5.0 g/dL    Alkaline Phosphatase 68 33 - 136 U/L    Total Protein 7.4 6.4 - 8.2 g/dL    AST 18 9 - 39 U/L    Bilirubin, Total 1.8 (H) 0.0 - 1.2 mg/dL    ALT 20 10 - 52 U/L   Lipase   Result Value Ref Range    Lipase 11 9 - 82 U/L   Troponin I, High Sensitivity, Initial   Result Value Ref Range    Troponin I, High Sensitivity 58 (HH) 0 - 20 ng/L   Troponin, High Sensitivity, 1 Hour   Result Value Ref Range    Troponin I, High Sensitivity 49 (H) 0 - 20 ng/L   Bilirubin, Direct   Result Value Ref Range    Bilirubin, Direct 0.3 0.0 - 0.3 mg/dL      Imaging  NM hepatobiliary w cholecystokinin  Result Date: 7/10/2025  Abnormal hepatobiliary imaging, finding compatible with acute cholecystitis with obstructive cystic duct Normal hepatic uptake function and patent biliary system     MACRO: Critical Finding:  See findings. Notification was initiated on 7/10/2025 at 4:31 pm by  Tabatha Romo.  (**-OCF-**) Instructions:   Signed by: Tabatha Romo 7/10/2025 4:33 PM Dictation workstation:   [id number]    US gallbladder  Result Date: 7/10/2025  Gallbladder sludge with borderline gallbladder wall thickening.  No cholelithiasis, gallbladder wall  thickening, or biliary dilatation is appreciated.  Given the findings on CT, consider further evaluation with a HIDA scan. Increased echogenicity of the right renal cortex consistent with intrinsic renal disease.  No hydronephrosis. Right renal cyst measuring 5.3 x 2.6 x 4.1 cm. Diffuse hepatic steatosis. Signed by Juan Antonio Rodarte MD    CT abdomen pelvis w IV contrast  Result Date: 7/10/2025  1.Distended gallbladder with questionable underlying gallstones, gallbladder wall thickening and mild pericholecystic haziness. Findings suggestive of acute cholecystitis.  Recommend further evaluation with right upper quadrant ultrasound. 2.Mild biliary dilatation with the common bile duct measuring up to 9 to 10 mm without definite choledocholithiasis. Signed by Juan Antonio Rodarte MD    XR chest 2 views  Result Date: 7/10/2025  Opacity in the right posterior costophrenic angle appears increased, possibly small enlarging pleural effusion or focal consolidation. Suggest radiographic follow-up to resolution.   MACRO: None   Signed by: Lewis Chang 7/10/2025 10:02 AM Dictation workstation:   [id number]      Cardiology, Vascular, and Other Imaging  No other imaging results found for the past 2 days       PAST HISTORIES AND ALLERGIES     Past Medical History  He has a past medical history of Abnormal auditory perception of both ears (02/09/2023), GERD (gastroesophageal reflux disease), Hyperlipidemia, Hypertension, Immunization not carried out because of patient refusal, Personal history of other diseases of the circulatory system, Personal history of other diseases of the musculoskeletal system and connective tissue, Personal history of other drug therapy, and Severe obesity (BMI 35.0-39.9) with comorbidity (Multi) (09/26/2023).    Surgical History  He has a past surgical history that includes Other surgical history (07/17/2020) and CT angio neck (2/21/2023).     Social History  He reports that he quit smoking about 35 years ago. His  smoking use included cigarettes. He has been exposed to tobacco smoke. He has never used smokeless tobacco. He reports current alcohol use of about 2.0 standard drinks of alcohol per week. He reports that he does not use drugs.    Family History  Family History[1]    Allergies  Losartan    MEDICATIONS     Scheduled Medications:  Scheduled Medications[2]  Continuous Medications:  Continuous Medications[3]  PRN Medications:  PRN Medications[4]     REVIEW OF SYSTEMS     Review of Systems    OBJECTIVE     Last Recorded Vitals  /80 (BP Location: Left arm, Patient Position: Sitting)   Pulse 78   Temp 36.2 °C (97.1 °F) (Temporal)   Resp 20   Wt 90.7 kg (200 lb)   SpO2 98%      Physical Exam:  Vital signs and nursing notes reviewed.   Constitutional: Pleasant and cooperative. Laying in bed in no acute distress. Conversant.   Skin: Warm and dry; no obvious lesions, rashes, pallor, or jaundice.   Eyes: EOMI. Anicteric sclera.   ENT: Mucous membranes moist; no obvious injury or deformity appreciated.   Head and Neck: Normocephalic, atraumatic. ROM preserved. Trachea midline. No appreciable JVD.   Respiratory: Nonlabored on RA. Lungs clear to auscultation bilaterally without obvious adventitious sounds. Chest rise is equal.  Cardiovascular: RRR. No gross murmur, gallop, or rub. Extremities are warm and well-perfused with good capillary refill (< 3 seconds). No chest wall tenderness.   GI: Abdomen soft, epigastric tender, nondistended. No obvious organomegaly appreciated. Bowel sounds are present.  : No CVA tenderness.   MSK: No gross abnormalities appreciated. No limitations to AROM/PROM appreciated.   Extremities: No cyanosis, edema, or clubbing evident. Neurovascularly intact.   Neuro: A&Ox3. CN 2-12 grossly intact. Able to respond to questions appropriately and clearly. No acute focal neurologic deficits appreciated.  Psych: Appropriate mood and behavior.    ASSESSMENT AND PLAN   Assessment/Plan     80 y.o. male  with PMHx s/f GERD coronary artery disease status post CABG borderline diabetes, carotid and vertebral artery disease, hypertension, dyslipidemia presenting with right upper quadrant pain.     Right upper quadrant pain suspected cholecystitis  Continue patient on IV Zosyn  Consult to acute care surgery  As needed morphine and Zofran  Clear liquid diet for now until seen by surgery    Coronary artery disease status post CABG with elevated troponin  Troponin leak thought secondary to demand ischemia patient does not appear to have active chest pain  Will continue the patient on his home beta-blocker, statin and baby aspirin for now  We will repeat an EKG since unable to pull 1 up at this time  Check echocardiogram and consult cardiology in consideration the patient may eventually need surgical intervention    HTN  Continue home medications             Mac Honeycutt, APRN-CNP    Dragon dictation software was used to dictate this note and thus there may be minor errors in translation/transcription including garbled speech or misspellings. Please contact for clarification if needed.       [1]   Family History  Problem Relation Name Age of Onset    Cancer Mother      Cancer Father     [2] [3] [4]

## 2025-07-10 NOTE — CARE PLAN
Problem: Pain - Adult  Goal: Verbalizes/displays adequate comfort level or baseline comfort level  Outcome: Progressing     Problem: Safety - Adult  Goal: Free from fall injury  Outcome: Progressing     Problem: Discharge Planning  Goal: Discharge to home or other facility with appropriate resources  Outcome: Progressing     Problem: Chronic Conditions and Co-morbidities  Goal: Patient's chronic conditions and co-morbidity symptoms are monitored and maintained or improved  Outcome: Progressing     Problem: Nutrition  Goal: Nutrient intake appropriate for maintaining nutritional needs  Outcome: Progressing     Problem: Pain  Goal: Takes deep breaths with improved pain control throughout the shift  Outcome: Progressing  Goal: Turns in bed with improved pain control throughout the shift  Outcome: Progressing  Goal: Walks with improved pain control throughout the shift  Outcome: Progressing  Goal: Performs ADL's with improved pain control throughout shift  Outcome: Progressing  Goal: Participates in PT with improved pain control throughout the shift  Outcome: Progressing  Goal: Free from opioid side effects throughout the shift  Outcome: Progressing  Goal: Free from acute confusion related to pain meds throughout the shift  Outcome: Progressing   The patient's goals for the shift include adequate pain control    The clinical goals for the shift include adequate pain control

## 2025-07-10 NOTE — ED PROVIDER NOTES
Chief Complaint   Patient presents with    Abdominal Pain     RUQ pain radiating from back towards belly button. X3 days. Denies N/V/D. Took laxative this AM for constipation. Recently started on new meds for GERD       HPI       80 year old male presents to the Emergency Department today complaining of RUQ pain that he describes as sharp in nature, constant since 0200, non-radiating, and varies in intensity. Denies any associated fever, chills, headache, neck pain, chest pain, shortness of breath, nausea, vomiting, diarrhea, constipation, or urinary symptoms.       History provided by:  Patient             Patient History   Medical History[1]  Surgical History[2]  Family History[3]  Social History[4]        Physical Exam  Constitutional:       Appearance: Normal appearance.   HENT:      Head: Normocephalic.      Right Ear: Tympanic membrane, ear canal and external ear normal.      Left Ear: Tympanic membrane, ear canal and external ear normal.      Nose: Nose normal.      Mouth/Throat:      Mouth: Mucous membranes are moist.      Pharynx: Oropharynx is clear. No oropharyngeal exudate or posterior oropharyngeal erythema.   Eyes:      Conjunctiva/sclera: Conjunctivae normal.      Pupils: Pupils are equal, round, and reactive to light.   Cardiovascular:      Rate and Rhythm: Normal rate and regular rhythm.      Pulses:           Radial pulses are 3+ on the right side and 3+ on the left side.        Dorsalis pedis pulses are 3+ on the right side and 3+ on the left side.      Heart sounds: Normal heart sounds. No murmur heard.     No friction rub. No gallop.   Pulmonary:      Effort: Pulmonary effort is normal. No respiratory distress.      Breath sounds: Normal breath sounds. No wheezing, rhonchi or rales.   Abdominal:      General: Abdomen is flat. Bowel sounds are normal.      Palpations: Abdomen is soft.      Tenderness: There is abdominal tenderness in the right upper quadrant. There is no right CVA tenderness,  left CVA tenderness, guarding or rebound. Negative signs include Rosen's sign and McBurney's sign.   Musculoskeletal:         General: No swelling or deformity.      Cervical back: Full passive range of motion without pain.      Right lower leg: No edema.      Left lower leg: No edema.   Lymphadenopathy:      Cervical: No cervical adenopathy.   Skin:     Capillary Refill: Capillary refill takes less than 2 seconds.      Coloration: Skin is not jaundiced.      Findings: No rash.   Neurological:      General: No focal deficit present.      Mental Status: He is alert and oriented to person, place, and time. Mental status is at baseline.      Gait: Gait is intact.   Psychiatric:         Mood and Affect: Mood normal.         Behavior: Behavior is cooperative.         Labs Reviewed   CBC WITH AUTO DIFFERENTIAL - Abnormal       Result Value    WBC 17.0 (*)     nRBC 0.0      RBC 4.52      Hemoglobin 14.4      Hematocrit 41.8      MCV 93      MCH 31.9      MCHC 34.4      RDW 13.7      Platelets 209      Neutrophils % 77.6      Immature Granulocytes %, Automated 0.4      Lymphocytes % 10.0      Monocytes % 11.6      Eosinophils % 0.1      Basophils % 0.3      Neutrophils Absolute 13.23 (*)     Immature Granulocytes Absolute, Automated 0.06      Lymphocytes Absolute 1.70      Monocytes Absolute 1.97 (*)     Eosinophils Absolute 0.02      Basophils Absolute 0.05     COMPREHENSIVE METABOLIC PANEL - Abnormal    Glucose 138 (*)     Sodium 135 (*)     Potassium 3.6      Chloride 96 (*)     Bicarbonate 29      Anion Gap 14      Urea Nitrogen 15      Creatinine 0.95      eGFR 80      Calcium 9.5      Albumin 4.4      Alkaline Phosphatase 68      Total Protein 7.4      AST 18      Bilirubin, Total 1.8 (*)     ALT 20     SERIAL TROPONIN-INITIAL - Abnormal    Troponin I, High Sensitivity 58 (*)     Narrative:     Less than 99th percentile of normal range cutoff-  Female and children under 18 years old <14 ng/L; Male <21 ng/L:  Negative  Repeat testing should be performed if clinically indicated.     Female and children under 18 years old 14-50 ng/L; Male 21-50 ng/L:  Consistent with possible cardiac damage and possible increased clinical   risk. Serial measurements may help to assess extent of myocardial damage.     >50 ng/L: Consistent with cardiac damage, increased clinical risk and  myocardial infarction. Serial measurements may help assess extent of   myocardial damage.      NOTE: Children less than 1 year old may have higher baseline troponin   levels and results should be interpreted in conjunction with the overall   clinical context.     NOTE: Troponin I testing is performed using a different   testing methodology at [facility] than at other   [facility]. Direct result comparisons should only   be made within the same method.   SERIAL TROPONIN, 1 HOUR - Abnormal    Troponin I, High Sensitivity 49 (*)     Narrative:     Less than 99th percentile of normal range cutoff-  Female and children under 18 years old <14 ng/L; Male <21 ng/L: Negative  Repeat testing should be performed if clinically indicated.     Female and children under 18 years old 14-50 ng/L; Male 21-50 ng/L:  Consistent with possible cardiac damage and possible increased clinical   risk. Serial measurements may help to assess extent of myocardial damage.     >50 ng/L: Consistent with cardiac damage, increased clinical risk and  myocardial infarction. Serial measurements may help assess extent of   myocardial damage.      NOTE: Children less than 1 year old may have higher baseline troponin   levels and results should be interpreted in conjunction with the overall   clinical context.     NOTE: Troponin I testing is performed using a different   testing methodology at [facility] than at other   [facility]. Direct result comparisons should only   be made within the same method.   LIPASE - Normal    Lipase 11      Narrative:      Venipuncture immediately after or during the administration of Metamizole may lead to falsely low results. Testing should be performed immediately prior to Metamizole dosing.   BILIRUBIN, DIRECT - Normal    Bilirubin, Direct 0.3     TROPONIN SERIES- (INITIAL, 1 HR)    Narrative:     The following orders were created for panel order Troponin I Series, High Sensitivity (0, 1 HR).  Procedure                               Abnormality         Status                     ---------                               -----------         ------                     Troponin I, High Sensiti...[[id number]]  Abnormal            Final result               Troponin, High Sensitivi...[[id number]]  Abnormal            Final result                 Please view results for these tests on the individual orders.       US gallbladder   Final Result   Gallbladder sludge with borderline gallbladder wall thickening.  No   cholelithiasis, gallbladder wall thickening, or biliary dilatation is   appreciated.  Given the findings on CT, consider further evaluation   with a HIDA scan.   Increased echogenicity of the right renal cortex consistent with   intrinsic renal disease.  No hydronephrosis.    Right renal cyst measuring 5.3 x 2.6 x 4.1 cm.   Diffuse hepatic steatosis.   Signed by Juan Antonio Rodarte MD      CT abdomen pelvis w IV contrast   Final Result   1.Distended gallbladder with questionable underlying gallstones,   gallbladder wall thickening and mild pericholecystic haziness.    Findings suggestive of acute cholecystitis.  Recommend further   evaluation with right upper quadrant ultrasound.   2.Mild biliary dilatation with the common bile duct measuring up   to 9 to 10 mm without definite choledocholithiasis.   Signed by Juan Antonio Rodarte MD      XR chest 2 views   Final Result   Opacity in the right posterior costophrenic angle appears increased,   possibly small enlarging pleural effusion or focal consolidation.   Suggest radiographic follow-up to  resolution.        MACRO:   None        Signed by: Lewis Sahuen 7/10/2025 10:02 AM   Dictation workstation:   [id number]      NM hepatobiliary w cholecystokinin    (Results Pending)            ED Course & MDM            Medical Decision Making  EKG interpreted by myself. Indication: medical clearance. Findings: NSR with a ventricular rate of 65, normal axis, normal intervals, and no acute ischemic or injury pattern. Impression: No acute pathology.       Patient was seen and evaluated by Dr. Moran. Placed on a cardiac monitor which showed normal sinus rhythm without ectopy throughout ED stay. Rhythm strip obtained showed normal sinus rhythm. Impression: No acute pathology. Continuous pulse oximetry monitoring showed no signs of hypoxia. Saline lock was established with labs drawn and results as above. Given Zofran with improvement in his nausea. Noted to have an elevated WBC count of 17.0. Remainder of blood counts, electrolytes, kidney function, liver function, and lipase were unremarkable. Heart Score- 5 with normal EKG  and elevated troponin of 58 with stable delta troponin. At this time, we feel that the RUQ is atypical for acute coronary syndrome. Although, he may be experiencing intermittent angina as he has described having frequent heartburn recently. We find no underlying evidence of a pneumothorax on CXR. Although, it did show an opacity in the right posterior costophrenic angle appears increased, possibly small enlarging pleural effusion or focal consolidation. He has no URI symptoms. Therefore, we feel that pneumonia is less likely. Clinically, we do not feel they are exhibiting signs of pulmonary embolism or thoracic aortic dissection (no connective tissue disorder, no tachycardia, tachypnea, hypoxia, and mediastinum normal in size on CXR). CT scan of his abdomen and pelvis with contrast shows a distended gallbladder with questionable underlying gallstones, gallbladder wall thickening and mild  pericholecystic haziness- findings suggestive of acute cholecystitis and mild biliary dilatation with the common bile duct measuring up to 9 to 10 mm without definite choledocholithiasis. US of the RUQ shows gallbladder sludge with borderline gallbladder wall thickening, no cholelithiasis, gallbladder wall thickening, or biliary dilatation is appreciated. Treated with Zosyn. Surgery consulted and they requested to have the patient get a HIDA scan to evaluate further.                Your medication list        ASK your doctor about these medications        Instructions Last Dose Given Next Dose Due   amLODIPine 10 mg tablet  Commonly known as: Norvasc      Take 1 tablet (10 mg) by mouth once daily.       aspirin 81 mg EC tablet           atorvastatin 80 mg tablet  Commonly known as: Lipitor      Take 1 tablet (80 mg) by mouth once daily.       chlorthalidone 25 mg tablet  Commonly known as: Hygroton      Take 1 tablet (25 mg) by mouth once daily.       ezetimibe 10 mg tablet  Commonly known as: Zetia      Take 1 tablet (10 mg) by mouth once daily.       meclizine 25 mg tablet  Commonly known as: Antivert      Take 1 tablet (25 mg) by mouth 3 times a day as needed for dizziness.       metoprolol succinate XL 25 mg 24 hr tablet  Commonly known as: Toprol-XL      Take 1 tablet (25 mg) by mouth once daily.       pantoprazole 40 mg EC tablet  Commonly known as: ProtoNix      Take 1 tablet (40 mg) by mouth once daily. Do not crush, chew, or split.       Vitamin D3 125 mcg (5,000 units) tablet  Generic drug: cholecalciferol                      Procedure  Procedures         [1]   Past Medical History:  Diagnosis Date    Abnormal auditory perception of both ears 02/09/2023    GERD (gastroesophageal reflux disease)     Hyperlipidemia     Hypertension     Immunization not carried out because of patient refusal     Refused pneumococcal vaccination    Personal history of other diseases of the circulatory system     History of  hypertension    Personal history of other diseases of the musculoskeletal system and connective tissue     History of arthritis    Personal history of other drug therapy     COVID-19 vaccine series completed    Severe obesity (BMI 35.0-39.9) with comorbidity (Multi) 2023   [2]   Past Surgical History:  Procedure Laterality Date    CT ANGIO NECK  2023    CT NECK ANGIO W AND WO IV CONTRAST POR CT    OTHER SURGICAL HISTORY  2020    Coronary artery bypass graft   [3]   Family History  Problem Relation Name Age of Onset    Cancer Mother      Cancer Father     [4]   Social History  Tobacco Use    Smoking status: Former     Current packs/day: 0.00     Types: Cigarettes     Quit date:      Years since quittin.5     Passive exposure: Past    Smokeless tobacco: Never   Vaping Use    Vaping status: Never Used   Substance Use Topics    Alcohol use: Yes     Alcohol/week: 2.0 standard drinks of alcohol     Types: 2 Shots of liquor per week     Comment: once in a while    Drug use: Never        Alec Stafford, LILIA-CNP  07/10/25 1637

## 2025-07-10 NOTE — Clinical Note
Single view of the saphenous vein graft to the obtuse marginal obtained using hand injection. Occluded

## 2025-07-11 ENCOUNTER — APPOINTMENT (OUTPATIENT)
Dept: CARDIOLOGY | Facility: HOSPITAL | Age: 80
DRG: 445 | End: 2025-07-11
Payer: MEDICARE

## 2025-07-11 ENCOUNTER — APPOINTMENT (OUTPATIENT)
Dept: CARDIOLOGY | Facility: HOSPITAL | Age: 80
End: 2025-07-11
Payer: MEDICARE

## 2025-07-11 PROBLEM — K80.00 CALCULUS OF GALLBLADDER WITH ACUTE CHOLECYSTITIS WITHOUT OBSTRUCTION: Status: ACTIVE | Noted: 2025-07-10

## 2025-07-11 LAB
ALBUMIN SERPL BCP-MCNC: 3.9 G/DL (ref 3.4–5)
ALP SERPL-CCNC: 71 U/L (ref 33–136)
ALT SERPL W P-5'-P-CCNC: 24 U/L (ref 10–52)
ANION GAP SERPL CALC-SCNC: 10 MMOL/L (ref 10–20)
AORTIC VALVE MEAN GRADIENT: 3 MMHG
AORTIC VALVE PEAK VELOCITY: 1.26 M/S
APTT PPP: 32 SECONDS (ref 26–36)
APTT PPP: 35 SECONDS (ref 26–36)
AST SERPL W P-5'-P-CCNC: 21 U/L (ref 9–39)
ATRIAL RATE: 138 BPM
AV PEAK GRADIENT: 6 MMHG
AVA (PEAK VEL): 1.75 CM2
AVA (VTI): 1.98 CM2
BILIRUB SERPL-MCNC: 1.9 MG/DL (ref 0–1.2)
BUN SERPL-MCNC: 11 MG/DL (ref 6–23)
CALCIUM SERPL-MCNC: 8.9 MG/DL (ref 8.6–10.3)
CHLORIDE SERPL-SCNC: 99 MMOL/L (ref 98–107)
CO2 SERPL-SCNC: 32 MMOL/L (ref 21–32)
CREAT SERPL-MCNC: 0.94 MG/DL (ref 0.5–1.3)
EGFRCR SERPLBLD CKD-EPI 2021: 81 ML/MIN/1.73M*2
EJECTION FRACTION APICAL 4 CHAMBER: 37.5
EJECTION FRACTION: 43 %
ERYTHROCYTE [DISTWIDTH] IN BLOOD BY AUTOMATED COUNT: 13.9 % (ref 11.5–14.5)
GLUCOSE SERPL-MCNC: 101 MG/DL (ref 74–99)
HCT VFR BLD AUTO: 39.2 % (ref 41–52)
HGB BLD-MCNC: 13.3 G/DL (ref 13.5–17.5)
INR PPP: 1.1 (ref 0.9–1.1)
LEFT ATRIUM VOLUME AREA LENGTH INDEX BSA: 20.2 ML/M2
LEFT VENTRICLE INTERNAL DIMENSION DIASTOLE: 4.54 CM (ref 3.5–6)
LEFT VENTRICULAR OUTFLOW TRACT DIAMETER: 1.99 CM
LV EJECTION FRACTION BIPLANE: 35 %
MCH RBC QN AUTO: 31.2 PG (ref 26–34)
MCHC RBC AUTO-ENTMCNC: 33.9 G/DL (ref 32–36)
MCV RBC AUTO: 92 FL (ref 80–100)
NRBC BLD-RTO: 0 /100 WBCS (ref 0–0)
P AXIS: -67 DEGREES
PLATELET # BLD AUTO: 194 X10*3/UL (ref 150–450)
POTASSIUM SERPL-SCNC: 3.6 MMOL/L (ref 3.5–5.3)
PR INTERVAL: 76 MS
PROT SERPL-MCNC: 6.5 G/DL (ref 6.4–8.2)
PROTHROMBIN TIME: 12.6 SECONDS (ref 9.8–12.4)
Q ONSET: 249 MS
QRS COUNT: 22 BEATS
QRS DURATION: 90 MS
QT INTERVAL: 352 MS
QTC CALCULATION(BAZETT): 532 MS
QTC FREDERICIA: 463 MS
R AXIS: -7 DEGREES
RBC # BLD AUTO: 4.26 X10*6/UL (ref 4.5–5.9)
RIGHT VENTRICLE FREE WALL PEAK S': 10.81 CM/S
RIGHT VENTRICLE PEAK SYSTOLIC PRESSURE: 24 MMHG
SODIUM SERPL-SCNC: 137 MMOL/L (ref 136–145)
T OFFSET: 425 MS
TRICUSPID ANNULAR PLANE SYSTOLIC EXCURSION: 2 CM
UFH PPP CHRO-ACNC: 0.7 IU/ML (ref ?–1.1)
UFH PPP CHRO-ACNC: 0.9 IU/ML (ref ?–1.1)
VENTRICULAR RATE: 137 BPM
WBC # BLD AUTO: 9.5 X10*3/UL (ref 4.4–11.3)

## 2025-07-11 PROCEDURE — 36415 COLL VENOUS BLD VENIPUNCTURE: CPT | Performed by: INTERNAL MEDICINE

## 2025-07-11 PROCEDURE — 2060000001 HC INTERMEDIATE ICU ROOM DAILY

## 2025-07-11 PROCEDURE — 85520 HEPARIN ASSAY: CPT | Performed by: INTERNAL MEDICINE

## 2025-07-11 PROCEDURE — 84075 ASSAY ALKALINE PHOSPHATASE: CPT | Performed by: SURGERY

## 2025-07-11 PROCEDURE — 85730 THROMBOPLASTIN TIME PARTIAL: CPT | Performed by: INTERNAL MEDICINE

## 2025-07-11 PROCEDURE — 85027 COMPLETE CBC AUTOMATED: CPT | Performed by: SURGERY

## 2025-07-11 PROCEDURE — 99222 1ST HOSP IP/OBS MODERATE 55: CPT | Performed by: INTERNAL MEDICINE

## 2025-07-11 PROCEDURE — 99232 SBSQ HOSP IP/OBS MODERATE 35: CPT | Performed by: SURGERY

## 2025-07-11 PROCEDURE — 2500000004 HC RX 250 GENERAL PHARMACY W/ HCPCS (ALT 636 FOR OP/ED): Performed by: INTERNAL MEDICINE

## 2025-07-11 PROCEDURE — 99233 SBSQ HOSP IP/OBS HIGH 50: CPT | Performed by: PHYSICIAN ASSISTANT

## 2025-07-11 PROCEDURE — 93005 ELECTROCARDIOGRAM TRACING: CPT

## 2025-07-11 PROCEDURE — 93306 TTE W/DOPPLER COMPLETE: CPT | Performed by: STUDENT IN AN ORGANIZED HEALTH CARE EDUCATION/TRAINING PROGRAM

## 2025-07-11 PROCEDURE — 2500000001 HC RX 250 WO HCPCS SELF ADMINISTERED DRUGS (ALT 637 FOR MEDICARE OP): Performed by: NURSE PRACTITIONER

## 2025-07-11 PROCEDURE — 2500000002 HC RX 250 W HCPCS SELF ADMINISTERED DRUGS (ALT 637 FOR MEDICARE OP, ALT 636 FOR OP/ED): Performed by: NURSE PRACTITIONER

## 2025-07-11 PROCEDURE — RXMED WILLOW AMBULATORY MEDICATION CHARGE

## 2025-07-11 PROCEDURE — 85610 PROTHROMBIN TIME: CPT | Performed by: SURGERY

## 2025-07-11 PROCEDURE — 93306 TTE W/DOPPLER COMPLETE: CPT

## 2025-07-11 PROCEDURE — 93010 ELECTROCARDIOGRAM REPORT: CPT | Performed by: INTERNAL MEDICINE

## 2025-07-11 PROCEDURE — 2500000004 HC RX 250 GENERAL PHARMACY W/ HCPCS (ALT 636 FOR OP/ED): Performed by: NURSE PRACTITIONER

## 2025-07-11 PROCEDURE — 2500000001 HC RX 250 WO HCPCS SELF ADMINISTERED DRUGS (ALT 637 FOR MEDICARE OP): Performed by: INTERNAL MEDICINE

## 2025-07-11 PROCEDURE — 36415 COLL VENOUS BLD VENIPUNCTURE: CPT | Performed by: SURGERY

## 2025-07-11 RX ORDER — REGADENOSON 0.08 MG/ML
0.4 INJECTION, SOLUTION INTRAVENOUS
Status: COMPLETED | OUTPATIENT
Start: 2025-07-11 | End: 2025-07-14

## 2025-07-11 RX ORDER — METRONIDAZOLE 500 MG/1
500 TABLET ORAL 2 TIMES DAILY
Qty: 20 TABLET | Refills: 0 | Status: SHIPPED | OUTPATIENT
Start: 2025-07-11 | End: 2025-07-22

## 2025-07-11 RX ORDER — METOPROLOL TARTRATE 1 MG/ML
10 INJECTION, SOLUTION INTRAVENOUS ONCE
Status: COMPLETED | OUTPATIENT
Start: 2025-07-11 | End: 2025-07-11

## 2025-07-11 RX ORDER — METOPROLOL SUCCINATE 25 MG/1
25 TABLET, EXTENDED RELEASE ORAL 2 TIMES DAILY
Status: DISCONTINUED | OUTPATIENT
Start: 2025-07-11 | End: 2025-07-16 | Stop reason: HOSPADM

## 2025-07-11 RX ORDER — HEPARIN SODIUM 10000 [USP'U]/100ML
0-4500 INJECTION, SOLUTION INTRAVENOUS CONTINUOUS
Status: DISCONTINUED | OUTPATIENT
Start: 2025-07-11 | End: 2025-07-15

## 2025-07-11 RX ORDER — AMOXICILLIN AND CLAVULANATE POTASSIUM 875; 125 MG/1; MG/1
1 TABLET, FILM COATED ORAL 2 TIMES DAILY
Qty: 20 TABLET | Refills: 0 | Status: SHIPPED | OUTPATIENT
Start: 2025-07-11 | End: 2025-07-22

## 2025-07-11 RX ORDER — AMINOPHYLLINE 25 MG/ML
125 INJECTION, SOLUTION INTRAVENOUS AS NEEDED
Status: DISCONTINUED | OUTPATIENT
Start: 2025-07-11 | End: 2025-07-16 | Stop reason: HOSPADM

## 2025-07-11 RX ADMIN — PIPERACILLIN SODIUM AND TAZOBACTAM SODIUM 3.38 G: 3; .375 INJECTION, SOLUTION INTRAVENOUS at 00:19

## 2025-07-11 RX ADMIN — PIPERACILLIN SODIUM AND TAZOBACTAM SODIUM 3.38 G: 3; .375 INJECTION, SOLUTION INTRAVENOUS at 12:28

## 2025-07-11 RX ADMIN — AMLODIPINE BESYLATE 10 MG: 10 TABLET ORAL at 09:30

## 2025-07-11 RX ADMIN — ATORVASTATIN CALCIUM 80 MG: 40 TABLET, FILM COATED ORAL at 20:19

## 2025-07-11 RX ADMIN — METOPROLOL SUCCINATE 25 MG: 25 TABLET, EXTENDED RELEASE ORAL at 20:19

## 2025-07-11 RX ADMIN — HEPARIN SODIUM 1600 UNITS/HR: 10000 INJECTION, SOLUTION INTRAVENOUS at 10:48

## 2025-07-11 RX ADMIN — METOPROLOL SUCCINATE 25 MG: 25 TABLET, EXTENDED RELEASE ORAL at 09:30

## 2025-07-11 RX ADMIN — CHLORTHALIDONE 25 MG: 25 TABLET ORAL at 09:31

## 2025-07-11 RX ADMIN — CHOLECALCIFEROL TAB 125 MCG (5000 UNIT) 125 MCG: 125 TAB at 09:30

## 2025-07-11 RX ADMIN — PIPERACILLIN SODIUM AND TAZOBACTAM SODIUM 3.38 G: 3; .375 INJECTION, SOLUTION INTRAVENOUS at 18:21

## 2025-07-11 RX ADMIN — METOPROLOL TARTRATE 10 MG: 5 INJECTION INTRAVENOUS at 10:38

## 2025-07-11 RX ADMIN — PANTOPRAZOLE SODIUM 40 MG: 40 INJECTION, POWDER, FOR SOLUTION INTRAVENOUS at 06:15

## 2025-07-11 RX ADMIN — ASPIRIN 81 MG: 81 TABLET, DELAYED RELEASE ORAL at 09:30

## 2025-07-11 RX ADMIN — PIPERACILLIN SODIUM AND TAZOBACTAM SODIUM 3.38 G: 3; .375 INJECTION, SOLUTION INTRAVENOUS at 06:15

## 2025-07-11 RX ADMIN — EZETIMIBE 10 MG: 10 TABLET ORAL at 09:30

## 2025-07-11 ASSESSMENT — PAIN SCALES - GENERAL
PAINLEVEL_OUTOF10: 0 - NO PAIN
PAINLEVEL_OUTOF10: 0 - NO PAIN

## 2025-07-11 ASSESSMENT — ENCOUNTER SYMPTOMS
FREQUENCY: 0
CONSTIPATION: 0
BLOOD IN STOOL: 0
NUMBNESS: 0
ARTHRALGIAS: 1
FLANK PAIN: 0
CONFUSION: 0
HEADACHES: 0
BACK PAIN: 0
WEAKNESS: 0
DYSURIA: 0
WOUND: 0
CHILLS: 0
CHEST TIGHTNESS: 0
MYALGIAS: 1
DIARRHEA: 0
EYE REDNESS: 0
VOMITING: 0
NAUSEA: 0
DIZZINESS: 0
DIAPHORESIS: 0
COUGH: 0
FATIGUE: 0
SPEECH DIFFICULTY: 0
FEVER: 0
APPETITE CHANGE: 0
POLYPHAGIA: 0
HALLUCINATIONS: 0
WHEEZING: 0
SHORTNESS OF BREATH: 0
EYE PAIN: 0
SORE THROAT: 0
TROUBLE SWALLOWING: 0
AGITATION: 0
LIGHT-HEADEDNESS: 0
FACIAL SWELLING: 0
JOINT SWELLING: 0
HEMATURIA: 0
BRUISES/BLEEDS EASILY: 0
PALPITATIONS: 0
ABDOMINAL PAIN: 0

## 2025-07-11 ASSESSMENT — ACTIVITIES OF DAILY LIVING (ADL): LACK_OF_TRANSPORTATION: NO

## 2025-07-11 ASSESSMENT — PAIN - FUNCTIONAL ASSESSMENT: PAIN_FUNCTIONAL_ASSESSMENT: 0-10

## 2025-07-11 NOTE — PROGRESS NOTES
"    GENERAL SURGERY / TRAUMA PROGRESS NOTE    Patient: Neftali Schmitt  Room: 2307/2307-A    Age: 80 y.o.   Gender: male  Attending: Jean-Pierre Mauricio MD    MRN: [id number]  Admission Date: 7/10/2025    PCP: Kiran Beard MD       Neftali Schmitt is a 80 y.o. male on day 1  of admission presenting with Cholecystitis.    SUBJECTIVE   Interval History:  Abdominal pain is much improved.  He has no nausea or vomiting.  No fevers overnight.  He is wanting to go home    ROS  Review of Systems   Constitutional:  Negative for chills, fatigue and fever.   HENT:  Negative for congestion, ear pain and hearing loss.    Eyes:  Negative for pain and redness.   Respiratory:  Negative for cough and shortness of breath.    Cardiovascular:  Negative for chest pain and leg swelling.   Gastrointestinal:  Negative for abdominal pain, constipation, diarrhea, nausea and vomiting.   Endocrine: Negative for polyphagia.   Genitourinary:  Negative for dysuria, flank pain, frequency and hematuria.   Musculoskeletal:  Positive for arthralgias and myalgias.   Skin:  Negative for rash and wound.   Allergic/Immunologic: Negative for immunocompromised state.   Neurological:  Negative for speech difficulty, weakness and headaches.   Hematological:  Does not bruise/bleed easily.   Psychiatric/Behavioral:  Negative for agitation and confusion.           OBJECTIVE   Last Recorded Vitals  Blood pressure 154/78, pulse 73, temperature 36.3 °C (97.3 °F), temperature source Temporal, resp. rate 18, height 1.676 m (5' 6\"), weight 90.7 kg (200 lb), SpO2 96%.    Intake/Output last 3 Shifts:  I/O last 3 completed shifts:  In: 1067 (11.8 mL/kg) [P.O.:917; IV Piggyback:150]  Out: - (0 mL/kg)   Weight: 90.7 kg     PHYSICAL EXAM  Physical Exam   General: Well-developed, well-nourished and in no acute distress.  Head: Normocephalic. Atraumatic.  Neck/thyroid: Neck is supple.   Eyes: Pupils equal round and reactive to light. Conjunctiva normal.  ENMT: No masses " or deformity of external nose. External ears without masses.  Respiratory/Chest:  Normal respiratory effort.  Cardiovascular: Regular rate and rhythm.   Abdomen: Soft, nontender, nondistended.  Minimal right upper quadrant tenderness without peritoneal signs.  Absence of Rosen sign.  Musculoskeletal: Joints and limbs are grossly normal. Normal gait. Normal range of motion of major joints.  Neuro: Oriented to person, place and time. No obvious neurological deficit. Motor strength grossly normal.  Psych: Normal mood and affect.    RESULTS   Labs  Results for orders placed or performed during the hospital encounter of 07/10/25 (from the past 24 hours)   Troponin, High Sensitivity, 1 Hour   Result Value Ref Range    Troponin I, High Sensitivity 49 (H) 0 - 20 ng/L   Bilirubin, Direct   Result Value Ref Range    Bilirubin, Direct 0.3 0.0 - 0.3 mg/dL   CBC   Result Value Ref Range    WBC 9.5 4.4 - 11.3 x10*3/uL    nRBC 0.0 0.0 - 0.0 /100 WBCs    RBC 4.26 (L) 4.50 - 5.90 x10*6/uL    Hemoglobin 13.3 (L) 13.5 - 17.5 g/dL    Hematocrit 39.2 (L) 41.0 - 52.0 %    MCV 92 80 - 100 fL    MCH 31.2 26.0 - 34.0 pg    MCHC 33.9 32.0 - 36.0 g/dL    RDW 13.9 11.5 - 14.5 %    Platelets 194 150 - 450 x10*3/uL   Comprehensive Metabolic Panel   Result Value Ref Range    Glucose 101 (H) 74 - 99 mg/dL    Sodium 137 136 - 145 mmol/L    Potassium 3.6 3.5 - 5.3 mmol/L    Chloride 99 98 - 107 mmol/L    Bicarbonate 32 21 - 32 mmol/L    Anion Gap 10 10 - 20 mmol/L    Urea Nitrogen 11 6 - 23 mg/dL    Creatinine 0.94 0.50 - 1.30 mg/dL    eGFR 81 >60 mL/min/1.73m*2    Calcium 8.9 8.6 - 10.3 mg/dL    Albumin 3.9 3.4 - 5.0 g/dL    Alkaline Phosphatase 71 33 - 136 U/L    Total Protein 6.5 6.4 - 8.2 g/dL    AST 21 9 - 39 U/L    Bilirubin, Total 1.9 (H) 0.0 - 1.2 mg/dL    ALT 24 10 - 52 U/L   Coagulation Screen   Result Value Ref Range    Protime 12.6 (H) 9.8 - 12.4 seconds    INR 1.1 0.9 - 1.1    aPTT 32 26 - 36 seconds       Radiology  Resutls  Imaging  NM hepatobiliary w cholecystokinin  Result Date: 7/10/2025  Abnormal hepatobiliary imaging, finding compatible with acute cholecystitis with obstructive cystic duct Normal hepatic uptake function and patent biliary system     MACRO: Critical Finding:  See findings. Notification was initiated on 7/10/2025 at 4:31 pm by  Tabatha Romo.  (**-OCF-**) Instructions:   Signed by: Tabatha Romo 7/10/2025 4:33 PM Dictation workstation:   [id number]    US gallbladder  Result Date: 7/10/2025  Gallbladder sludge with borderline gallbladder wall thickening.  No cholelithiasis, gallbladder wall thickening, or biliary dilatation is appreciated.  Given the findings on CT, consider further evaluation with a HIDA scan. Increased echogenicity of the right renal cortex consistent with intrinsic renal disease.  No hydronephrosis. Right renal cyst measuring 5.3 x 2.6 x 4.1 cm. Diffuse hepatic steatosis. Signed by Juan Antonio Rodarte MD    CT abdomen pelvis w IV contrast  Result Date: 7/10/2025  1.Distended gallbladder with questionable underlying gallstones, gallbladder wall thickening and mild pericholecystic haziness. Findings suggestive of acute cholecystitis.  Recommend further evaluation with right upper quadrant ultrasound. 2.Mild biliary dilatation with the common bile duct measuring up to 9 to 10 mm without definite choledocholithiasis. Signed by Juan Antonio Rodarte MD    XR chest 2 views  Result Date: 7/10/2025  Opacity in the right posterior costophrenic angle appears increased, possibly small enlarging pleural effusion or focal consolidation. Suggest radiographic follow-up to resolution.   MACRO: None   Signed by: Lewis Chang 7/10/2025 10:02 AM Dictation workstation:   [id number]      Cardiology, Vascular, and Other Imaging  No other imaging results found for the past 2 days         ASSESSMENT / PLAN     Assessment & Plan  Cholecystitis         PLAN    Recommended treating the infection for the acute cholecystitis  before proceeding with surgical intervention given his advanced age and his underlying cardiac issues.  He is responding well to the antibiotics and has had a normalization of his WBC.  He is refusing a cholecystostomy tube.  Reviewed that he will have an increased risk of failure with just the oral antibiotics, but he reports that he has had multiple friends with these tubes and he does not want one.  Explained the treatment plan with treating of the infection first and plan for outpatient surgery in the next 4 to 6 weeks.  He may follow-up in my office on 7/22/2025 to discuss timing of the cholecystectomy.  Recommended staying on a low grease low-fat diet.  If he is able to tolerate a diet, okay for discharge home today once cleared by medicine.  Prescription for antibiotics and discharge instructions placed in the discharge tab.      Fela Mata MD, FACS  [facility]  [address] [place] [address];   [email or web address]  [place]  [address] [phone]

## 2025-07-11 NOTE — PROGRESS NOTES
07/11/25 1330   Discharge Planning   Living Arrangements Spouse/significant other   Support Systems Spouse/significant other   Assistance Needed independnet in all care, denies falls, no DME.   Type of Residence Private residence   Number of Stairs to Enter Residence 2   Number of Stairs Within Residence 15   Do you have animals or pets at home? Yes   Type of Animals or Pets cat   Who is requesting discharge planning? Provider   Home or Post Acute Services None   Expected Discharge Disposition Home   Does the patient need discharge transport arranged? No   Financial Resource Strain   How hard is it for you to pay for the very basics like food, housing, medical care, and heating? Not hard   Housing Stability   In the last 12 months, was there a time when you were not able to pay the mortgage or rent on time? N   In the past 12 months, how many times have you moved where you were living? 0   At any time in the past 12 months, were you homeless or living in a shelter (including now)? N   Transportation Needs   In the past 12 months, has lack of transportation kept you from medical appointments or from getting medications? no   In the past 12 months, has lack of transportation kept you from meetings, work, or from getting things needed for daily living? No   Patient Choice   Patient / Family choosing to utilize agency / facility established prior to hospitalization No   Stroke Family Assessment   Stroke Family Assessment Needed No   Intensity of Service   Intensity of Service 0-30 min     Spoke with pt, role of TCC explained, demographics confirmed. PCP- Dr. Beard with a visit last month. Pharmacy- [facility] in [facility]- takes meds as prescribed, able to afford and obtain. Lives with significant other- independent in all care, no DME, no falls. Plan- Home no needs.    27-Feb-2020

## 2025-07-11 NOTE — CARE PLAN
Problem: Pain - Adult  Goal: Verbalizes/displays adequate comfort level or baseline comfort level  Outcome: Progressing     Problem: Safety - Adult  Goal: Free from fall injury  Outcome: Progressing     Problem: Discharge Planning  Goal: Discharge to home or other facility with appropriate resources  Outcome: Progressing     Problem: Chronic Conditions and Co-morbidities  Goal: Patient's chronic conditions and co-morbidity symptoms are monitored and maintained or improved  Outcome: Progressing     Problem: Nutrition  Goal: Nutrient intake appropriate for maintaining nutritional needs  Outcome: Progressing     Problem: Pain  Goal: Takes deep breaths with improved pain control throughout the shift  Outcome: Progressing  Goal: Turns in bed with improved pain control throughout the shift  Outcome: Progressing  Goal: Walks with improved pain control throughout the shift  Outcome: Progressing  Goal: Performs ADL's with improved pain control throughout shift  Outcome: Progressing  Goal: Participates in PT with improved pain control throughout the shift  Outcome: Progressing  Goal: Free from opioid side effects throughout the shift  Outcome: Progressing  Goal: Free from acute confusion related to pain meds throughout the shift  Outcome: Progressing     The patient will remain free of infection, as evidenced by normal vital signs and the absence of signs and symptoms of infection.  The patient will maintain or restore defenses.  The patient will demonstrate a meticulous hand-washing technique.  Alleviate or reduce the problems related to the infection and verbalize importance of hygiene.     Within the next [TIMEFRAME], the patient will exhibit no signs of infection, such as fever, redness, swelling, or drainage from potential sites of infection, maintain a temperature within the normal range of 36.5°C to 37.2°C, a heart rate between  beats per minute, a respiratory rate of 12-18 breaths per minute, and a white blood  cell count within the standard limits.The patient's goals for the shift include decrease anxiety, adequate pain control    The clinical goals for the shift include decrease anxiety, adequate pain control

## 2025-07-11 NOTE — PROGRESS NOTES
Neftali Schmitt is a 80 y.o. male on day 1 of admission presenting with Cholecystitis.      Subjective   Neftali Schmitt is a 80 y.o. male with PMHx s/f GERD coronary artery disease status post CABG, borderline diabetes, carotid and vertebral artery disease, hypertension, dyslipidemia presented 7/10/25 with right upper quadrant pain.  Patient frequently has fairly severe GERD symptoms does typically run from his throat down to his epigastrium.  This pain and discomfort he started having that morning was much worse and located differently.  Starting more laterally in the right epigastrium into the middle epigastrium the patient developed sharp pain.  He did have some nausea.  No vomiting.  He denies any fevers but has had some chills for the last couple days.  The patient has not noticed any pain similar to this associated with any meals prior to this.  He is denying any chest pain or shortness of breath no diarrhea or urinary symptoms.  In the emergency department the initial CT scan was suggestive of acute cholecystitis the ultrasound was not suggestive of cholecystitis. The ED provider discussed the case with surgery HIDA scan was ordered.  Surgery advised to place the patient on antibiotic therapy for now and if needed patient might need to go for cholecystotomy tube and cholecystectomy at a later time. alk phos 68, ALT 20, AST 18, bilirubin 1.8.    7/11/2025: No acute events overnight. Vitals stable, afebrile. CBC/CMP reviewed, bili 1.9. HSTI 58>>49. No leukocytosis. HIDA abnormal with acute cholecystitis with obstructive cystic duct. Surgery ordered IR guided cholecystostomy tube however patient declined.  General surgery planning antibiotic therapy only for now with delayed cholecystectomy given his advanced age and underlying cardiac issues also given the fact that he did respond well in regards to pain and normalization of his white blood cell count with antibiotics alone.  Patient advised that he does  have an increased risk of failure with oral antibiotics alone, he verbalized understanding.  He has an office appointment arranged with Dr. Mata for 7/22/2025  HR noted to be 130's, ECG obtained revealed paroxysmal atrial fibrillation/flutter- new diagnosis, Dr. Stewart was in to see patient and gave IV metoprolol x1 and started on heparin drip, per his note he offered CARRI/cardioversion although patient stated he did not want to pursue at this time as he believes his elevated heart rate was related to being upset regarding the recommendation of a cholecystostomy tube. Continue with rate control only for now, can consider rhythm control if needed in the future     Addendum: Patient converted back to NSR with heart rate in the 60s.  He is again asking for discharge to home.  I discussed the case with his cardiologist Dr. Stewart who saw him this morning as patient is scheduled for a stress test however unfortunately he had caffeine this morning so cannot have the stress test inpatient until Monday as the we do not run these on the weekend.  Dr. Stewart advises that stress test should be completed prior to discharge rather than as an outpatient as patient with known CAD history, was having chest pain with arrhythmia, ST depression on ECG with arrhythmia, and mildly abnormal troponin on admission. Patient would need to leave AGAINST MEDICAL ADVICE if he is not willing to complete workup.      Patient evaluated in the presence of Pharmacist, RN, and Pharmacy student(s)    Review of Systems   Constitutional:  Negative for appetite change, chills, diaphoresis, fatigue and fever.   HENT:  Negative for congestion, ear pain, facial swelling, hearing loss, nosebleeds, sore throat, tinnitus and trouble swallowing.    Eyes:  Negative for pain.   Respiratory:  Negative for cough, chest tightness, shortness of breath and wheezing.    Cardiovascular:  Negative for chest pain, palpitations and leg swelling.   Gastrointestinal:  Negative  for abdominal pain, blood in stool, constipation, diarrhea, nausea and vomiting.   Genitourinary:  Negative for dysuria, flank pain, frequency, hematuria and urgency.   Musculoskeletal:  Negative for back pain and joint swelling.   Skin:  Negative for rash and wound.   Neurological:  Negative for dizziness, syncope, weakness, light-headedness, numbness and headaches.   Hematological:  Does not bruise/bleed easily.   Psychiatric/Behavioral:  Negative for behavioral problems, hallucinations and suicidal ideas.           Objective     Last Recorded Vitals  /73 (BP Location: Right arm, Patient Position: Lying)   Pulse 66   Temp 36.9 °C (98.5 °F) (Temporal)   Resp 18   Wt 90.7 kg (200 lb)   SpO2 97%     Image Results  Imaging  NM hepatobiliary w cholecystokinin  Result Date: 7/10/2025  Abnormal hepatobiliary imaging, finding compatible with acute cholecystitis with obstructive cystic duct Normal hepatic uptake function and patent biliary system     MACRO: Critical Finding:  See findings. Notification was initiated on 7/10/2025 at 4:31 pm by  Tabatha Romo.  (**-OCF-**) Instructions:   Signed by: Tabatha Romo 7/10/2025 4:33 PM Dictation workstation:   [id number]    US gallbladder  Result Date: 7/10/2025  Gallbladder sludge with borderline gallbladder wall thickening.  No cholelithiasis, gallbladder wall thickening, or biliary dilatation is appreciated.  Given the findings on CT, consider further evaluation with a HIDA scan. Increased echogenicity of the right renal cortex consistent with intrinsic renal disease.  No hydronephrosis. Right renal cyst measuring 5.3 x 2.6 x 4.1 cm. Diffuse hepatic steatosis. Signed by Juan Antonio Rodarte MD    CT abdomen pelvis w IV contrast  Result Date: 7/10/2025  1.Distended gallbladder with questionable underlying gallstones, gallbladder wall thickening and mild pericholecystic haziness. Findings suggestive of acute cholecystitis.  Recommend further evaluation with right upper  quadrant ultrasound. 2.Mild biliary dilatation with the common bile duct measuring up to 9 to 10 mm without definite choledocholithiasis. Signed by Juan Antonio Rodarte MD    XR chest 2 views  Result Date: 7/10/2025  Opacity in the right posterior costophrenic angle appears increased, possibly small enlarging pleural effusion or focal consolidation. Suggest radiographic follow-up to resolution.   MACRO: None   Signed by: Lewis Chang 7/10/2025 10:02 AM Dictation workstation:   [id number]      Cardiology, Vascular, and Other Imaging  No other imaging results found for the past 7 days       Lab Results  Results for orders placed or performed during the hospital encounter of 07/10/25 (from the past 24 hours)   CBC and Auto Differential   Result Value Ref Range    WBC 17.0 (H) 4.4 - 11.3 x10*3/uL    nRBC 0.0 0.0 - 0.0 /100 WBCs    RBC 4.52 4.50 - 5.90 x10*6/uL    Hemoglobin 14.4 13.5 - 17.5 g/dL    Hematocrit 41.8 41.0 - 52.0 %    MCV 93 80 - 100 fL    MCH 31.9 26.0 - 34.0 pg    MCHC 34.4 32.0 - 36.0 g/dL    RDW 13.7 11.5 - 14.5 %    Platelets 209 150 - 450 x10*3/uL    Neutrophils % 77.6 40.0 - 80.0 %    Immature Granulocytes %, Automated 0.4 0.0 - 0.9 %    Lymphocytes % 10.0 13.0 - 44.0 %    Monocytes % 11.6 2.0 - 10.0 %    Eosinophils % 0.1 0.0 - 6.0 %    Basophils % 0.3 0.0 - 2.0 %    Neutrophils Absolute 13.23 (H) 1.60 - 5.50 x10*3/uL    Immature Granulocytes Absolute, Automated 0.06 0.00 - 0.50 x10*3/uL    Lymphocytes Absolute 1.70 0.80 - 3.00 x10*3/uL    Monocytes Absolute 1.97 (H) 0.05 - 0.80 x10*3/uL    Eosinophils Absolute 0.02 0.00 - 0.40 x10*3/uL    Basophils Absolute 0.05 0.00 - 0.10 x10*3/uL   Comprehensive metabolic panel   Result Value Ref Range    Glucose 138 (H) 74 - 99 mg/dL    Sodium 135 (L) 136 - 145 mmol/L    Potassium 3.6 3.5 - 5.3 mmol/L    Chloride 96 (L) 98 - 107 mmol/L    Bicarbonate 29 21 - 32 mmol/L    Anion Gap 14 10 - 20 mmol/L    Urea Nitrogen 15 6 - 23 mg/dL    Creatinine 0.95 0.50 - 1.30  mg/dL    eGFR 80 >60 mL/min/1.73m*2    Calcium 9.5 8.6 - 10.3 mg/dL    Albumin 4.4 3.4 - 5.0 g/dL    Alkaline Phosphatase 68 33 - 136 U/L    Total Protein 7.4 6.4 - 8.2 g/dL    AST 18 9 - 39 U/L    Bilirubin, Total 1.8 (H) 0.0 - 1.2 mg/dL    ALT 20 10 - 52 U/L   Lipase   Result Value Ref Range    Lipase 11 9 - 82 U/L   Troponin I, High Sensitivity, Initial   Result Value Ref Range    Troponin I, High Sensitivity 58 (HH) 0 - 20 ng/L   Troponin, High Sensitivity, 1 Hour   Result Value Ref Range    Troponin I, High Sensitivity 49 (H) 0 - 20 ng/L   Bilirubin, Direct   Result Value Ref Range    Bilirubin, Direct 0.3 0.0 - 0.3 mg/dL   CBC   Result Value Ref Range    WBC 9.5 4.4 - 11.3 x10*3/uL    nRBC 0.0 0.0 - 0.0 /100 WBCs    RBC 4.26 (L) 4.50 - 5.90 x10*6/uL    Hemoglobin 13.3 (L) 13.5 - 17.5 g/dL    Hematocrit 39.2 (L) 41.0 - 52.0 %    MCV 92 80 - 100 fL    MCH 31.2 26.0 - 34.0 pg    MCHC 33.9 32.0 - 36.0 g/dL    RDW 13.9 11.5 - 14.5 %    Platelets 194 150 - 450 x10*3/uL   Comprehensive Metabolic Panel   Result Value Ref Range    Glucose 101 (H) 74 - 99 mg/dL    Sodium 137 136 - 145 mmol/L    Potassium 3.6 3.5 - 5.3 mmol/L    Chloride 99 98 - 107 mmol/L    Bicarbonate 32 21 - 32 mmol/L    Anion Gap 10 10 - 20 mmol/L    Urea Nitrogen 11 6 - 23 mg/dL    Creatinine 0.94 0.50 - 1.30 mg/dL    eGFR 81 >60 mL/min/1.73m*2    Calcium 8.9 8.6 - 10.3 mg/dL    Albumin 3.9 3.4 - 5.0 g/dL    Alkaline Phosphatase 71 33 - 136 U/L    Total Protein 6.5 6.4 - 8.2 g/dL    AST 21 9 - 39 U/L    Bilirubin, Total 1.9 (H) 0.0 - 1.2 mg/dL    ALT 24 10 - 52 U/L   Coagulation Screen   Result Value Ref Range    Protime 12.6 (H) 9.8 - 12.4 seconds    INR 1.1 0.9 - 1.1    aPTT 32 26 - 36 seconds        Medications  Scheduled medications:  Scheduled Medications[1]  Continuous medications:  Continuous Medications[2]  PRN medications:  PRN Medications[3]     Physical Exam  Constitutional:       General: He is not in acute distress.     Appearance:  Normal appearance.      Comments: Sitting upright in bedside chair   HENT:      Head: Normocephalic and atraumatic.      Right Ear: External ear normal.      Left Ear: External ear normal.      Nose: Nose normal.      Mouth/Throat:      Mouth: Mucous membranes are moist.      Pharynx: Oropharynx is clear.   Eyes:      Extraocular Movements: Extraocular movements intact.      Conjunctiva/sclera: Conjunctivae normal.      Pupils: Pupils are equal, round, and reactive to light.   Cardiovascular:      Rate and Rhythm: Tachycardia present. Rhythm irregular.      Pulses: Normal pulses.      Heart sounds: Normal heart sounds.   Pulmonary:      Effort: Pulmonary effort is normal. No respiratory distress.      Breath sounds: Normal breath sounds. No wheezing, rhonchi or rales.   Abdominal:      General: Bowel sounds are normal.      Palpations: Abdomen is soft.      Tenderness: There is abdominal tenderness (RUQ). There is no right CVA tenderness, left CVA tenderness, guarding or rebound.   Musculoskeletal:         General: No swelling. Normal range of motion.      Cervical back: Normal range of motion and neck supple.   Skin:     General: Skin is warm and dry.      Capillary Refill: Capillary refill takes less than 2 seconds.      Findings: No rash.   Neurological:      General: No focal deficit present.      Mental Status: He is alert and oriented to person, place, and time. Mental status is at baseline.   Psychiatric:         Mood and Affect: Mood normal.         Behavior: Behavior normal.                  Assessment/Plan      Acute cholecystitis  CT scan suggestive of acute cholecystitis, US undetermined, HIDA confirms dx  Surgery consulted  IV zosyn  Diet per general surgery service  As needed morphine and Zofran   Monitor fever/WBC curve  Total bili slightly elevated, direct bili WNL  Surgery ordered IR guided cholecystostomy tube however patient declined  General surgery planning antibiotic therapy only for now with  delayed cholecystectomy given his advanced age and underlying cardiac issues also given the fact that he did respond well in regards to pain and normalization of his white blood cell count with antibiotics alone  Patient advised that he does have an increased risk of failure with oral antibiotics alone, he verbalized understanding  He has an office appointment arranged with Dr. Mata for 7/22/2025    Paroxysmal atrial fibrillation/flutter-newly diagnosed  ECG done this morning at 848 am showed atrial flutter with a heart rate of 137 bpm and ST segment depression in V2 through V6.   Heparin drip  IV beta-blocker x1  Increase PO metoprolol dose  Transfer to SDU for new arrhythmia and closer monitoring  Tele monitoring   Echo  Cardiology on board, offered CARRI but patient declined at this time- reevaluate possibility of rhythm control at a later time    Coronary artery disease status post CABG   Mildly elevated troponin  Asa, statin, BB  Troponin leak thought secondary to demand ischemia patient does not appear to have active chest pain   ECG nonischemic  Check echocardiogram and consult cardiology in consideration the patient may eventually need surgical intervention   Stress test ordered    HTN  Continue home medications  Monitor BP  Adjust as needed     DLD  Statin as above  Monitor LFTs    Code Status: Full Code                 DVT ppx: SCDs, SQH     Please see orders for more complete plan    Lauren Griffin PA-C         [1] amLODIPine, 10 mg, oral, Daily  aspirin, 81 mg, oral, Daily  atorvastatin, 80 mg, oral, Nightly  chlorthalidone, 25 mg, oral, Daily  cholecalciferol, 125 mcg, oral, Daily  ezetimibe, 10 mg, oral, Daily  heparin (porcine), 5,000 Units, subcutaneous, q8h  metoprolol succinate XL, 25 mg, oral, Daily  pantoprazole, 40 mg, oral, Daily before breakfast   Or  pantoprazole, 40 mg, intravenous, Daily before breakfast  piperacillin-tazobactam, 3.375 g, intravenous, q6h  polyethylene glycol, 17 g,  oral, Daily    [2]    [3] PRN medications: bisacodyl, bisacodyl, guaiFENesin, melatonin, morphine, ondansetron **OR** ondansetron

## 2025-07-11 NOTE — PROGRESS NOTES
Occupational Therapy                 Therapy Communication Note    Patient Name: Neftali Schmitt  MRN: [id number]  Department: [facility]  Room: 2307/230-A  Today's Date: 7/11/2025     Discipline: Occupational Therapy    Missed Visit: OT Missed Visit: Yes     Missed Visit Reason: Missed Visit Reason: Patient placed on medical hold (RN requested HOLD therapy evals at this time due to uncontrolled HR. Will re attemp as schedule allows and as pt more appropriate.)    Missed Time: Attempt    Comment: Observed pt functional mobility within hospital room after toileting routine independently without AE at this time.

## 2025-07-11 NOTE — CARE PLAN
Problem: Pain - Adult  Goal: Verbalizes/displays adequate comfort level or baseline comfort level  Outcome: Progressing     Problem: Discharge Planning  Goal: Discharge to home or other facility with appropriate resources  Outcome: Progressing   The patient's goals for the shift include adequate pain control    The clinical goals for the shift include Pt will remain HDS, pain and nausea controlled throughout this shift.    Over the shift, the patient did not make progress toward the following goals. Barriers to progression include n/a. Recommendations to address these barriers include n/a.

## 2025-07-11 NOTE — PROGRESS NOTES
Physical Therapy                 Therapy Communication Note    Patient Name: Neftali Schmitt  MRN: [id number]  Department: [facility]  Room: 2307/230-A  Today's Date: 7/11/2025     Discipline: Physical Therapy    Missed Visit: PT Missed Visit: Yes     Missed Visit Reason: Missed Visit Reason: Patient placed on medical hold (PT order received and chart reviewed. Discussed w/ RN who requested HOLD therapy evals at this time w/ patient due to uncontrolled HR. Observed patient up in room AMB from bathroom to window w/o AD. Will re-attempt at a later time when more appropriate.)    Missed Time: Attempt    Comment:        Completion of this session, clinical decision making, and documentation performed under the supervision/direction of Trish Tubbs.      KATRINA VANCE-PT

## 2025-07-11 NOTE — DISCHARGE INSTRUCTIONS
"Stay on a low grease, low-fat diet.  This includes avoiding \"healthy fats\".  Take both of the antibiotics as prescribed.  You are on both Augmentin and Flagyl for 10 days.  Take until gone even if you are feeling better.  Follow-up with Dr. Mata (general surgery) on 7/22/2025 to discuss scheduling of your gallbladder surgery.  " procedure.     - Do not drink alcoholic beverages for 24 hours after your procedure.    WOUND CARE   *FOR FEMORAL (LEG) ACCESS*  ·      Avoid heavy lifting (over 10 pounds) for 7 days, squatting or excessive bending for 2 days, and strenuous exercise for 7 days.  ·      No submerged bathing, swimming, or hot tubs for the next 7 days, or until fully healed.  ·      Avoid sexual activity for 3-4 days until any groin discomfort has ceased.     *FOR RADIAL (WRIST) ACCESS*  ·      No excessive use of the wrist for 24 hours - for example, treat your wrist as if it is sprained.  '      Do not lift anymore than 5 lbs. For 5 days with the same arm as your wrist/access site is on.  ·      Do not engage in vigorous activities (tennis, golf, bowling, weights) for at least 48 hours after the procedure.  ·      Do not submerge the wrist for 7 days after the procedure.  ·      You should expect mild tingling in your hand and tenderness at the puncture site for up to 3 days.    - The transparent dressing should be removed from the site 24 hours after the procedure.  Wash the site gently with soap and water. Rinse well and pat dry. Keep the area clean and dry. You may apply a Band-Aid to the site. Avoid lotions, ointments, or powders until fully healed.     - You may shower the day after your procedure.      - It is normal to notice a small bruise around the puncture site and/or a small grape sized or smaller lump. Any large bruising or large lump warrants a call to the office.     - If bleeding should occur, lay down and apply pressure to the affected area for 10 minutes.  If the bleeding stops notify your physician.  If there is a large amount of bleeding or spurting of blood CALL 911 immediately.  DO NOT drive yourself to the hospital.    - You may experience some tenderness, bruising or minimal inflammation.  If you have any concerns, you may contact the Cath Lab or if any of these symptoms become excessive, contact your  cardiologist or go to the emergency room.     OTHER INSTRUCTIONS  - You may take acetaminophen (Tylenol) as directed for discomfort.  If pain is not relieved with acetaminophen (Tylenol), contact your doctor.    - If you notice or experience any of the following, you should notify your doctor or seek medical attention  Chest pain or discomfort  Change in mental status or weakness in extremities.  Dizziness, light headedness, or feeling faint.  Change in the site where the procedure was performed, such as bleeding or an increased area of bruising or swelling.  Tingling, numbness, pain, or coolness in the leg/arm beyond the site where the procedure was performed.  Signs of infection (i.e. shaking chills, temperature > 100 degrees Fahrenheit, warmth, redness) in the leg/arm area where the procedure was performed.  Changes in urination   Bloody or black stools  Vomiting blood  Severe nose bleeds  Any excessive bleeding    - If you DO NOT have an appointment with your cardiologist within 2-4 weeks following your procedure, please contact their office.      Lifestyle Recommendations for a Healthier Life:    The following lifestyle changes can have a significant positive impact on your overall health - helping you to achieve healthy weight, lower metabolic and heart disease risk and manage stress more effectively:      1. Eat whole, fresh foods. Food is one of the biggest drivers of our overall health.  Make your meals whole foods based, focusing on a wide variety of non starchy vegetables and fruits.  It also beneficial to include various nuts, seeds and high-quality animal proteins for overall balanced diet.    2. Remove the sweeteners from your diet.  Artificial sweeteners have a negative impact on our metabolic health - they can cause increase in both glucose and insulin, increasing the risk or potential for insulin resistance and abnormal blood sugar levels.  Artificial sweeteners are also excessively sweeter than  regular sugar, they trick our taste buds into craving extreme forms of sweet, like an addiction.  I encourage you to avoid sugar, but also stevia, aspartame, sucralose, sugar alcohols like xylitol and maltitol.    3. Get rid of the inflammatory factors in your diet - this mainly comes from sugars of all kinds and refined vegetable oils.  These can increase our risk for changes in our metabolic health which can lead to diabetes, heart disease and other chronic disease.  You can reverse or combat the effective of inflammatory foods by increasing your intake of anti-inflammatory foods like wild-caught fish, fresh ground flax seed, fish oil and variety of fruits & vegetables.      4. Eat plenty of fiber!!  The standard American diet has very low levels of daily dietary fiber, many people barely get 15 grams per day.  There is plenty of nutrition research that shows how high-fiber foods can help lower our blood sugar.   Eat a wide variety of fiber-rich plant-based foods including nuts, seeds, fruits, vegetables, and legumes.    5. Get enough sleep. Studies from experts in endocrinology & metabolism have shown that even a few nights of poor sleep can increase the risk of insulin resistance and abnormal blood sugar values. Make sleep a priority - it is an important factor in your health.  Develop a sleep routine including: avoid eating two-three hours before bed, practice relaxation techniques (warm bath, meditation, yoga, mindfulness) to help relax your muscles and prepare you for sleep.    Go to bed and wake up at consistent times.  Avoid screen time for at least 60-90 minutes before bedtime.    6. Make exercise part of your regular routine.  Exercise helps us manage blood sugar more effectively and makes our cells more receptive to the effect of the insulin our body makes (lowers blood sugar).  Regular aerobic exercise AND interval or strength training are ideal to help maintain a healthy weight, metabolism & lower the  risk of chronic disease.      7. Control stress levels. Chronic stress can lead to elevated blood sugar, elevated blood pressure, accumulation of fat around the belly and these things increase the risk for metabolic syndrome, diabetes and heart disease.  We all have various levels of stress in our lives, we can't control all of it, but we can control how we respond to it and manage it's impact.  Find healthy outlets for stress management like meditation, yoga, deep breathing, or exercise.    Home BP monitoring instructions:   Goal < 130 / 80   Remain still, Avoid smoking, caffeinated beverages, or exercise within 30 min before BP measurements.  Ensure 5 min of quiet rest before BP measurements.  Sit correctly with back straight and supported (on a straight-backed dining chair, for example, rather than a sofa).  Sit with feet flat on the floor and legs uncrossed.  Keep arm supported on a flat surface (such as a table), with the upper arm at heart level.  Bottom of the cuff should be placed directly above the bend of the elbow  Take a reading in the AM before breakfast 2-3 times / week   Record all readings accurately:  A written log should be brought to all appointments   Monitors with built-in memory should be brought to all clinic appointments and calibrated to our machines      Weight Management:  Since food equals calories, in order to lose weight you must either eat fewer calories, exercise more to burn off calories with activity, or both. Food that is not used to fuel the body is stored as fat.    A major component of losing weight is to make smarter food choices. Here's how:    Limit non-nutritious foods, such as:  Sugar, honey, syrups and candy  Pastries, donuts, pies, cakes and cookies  Soft drinks, sweetened juices and alcoholic beverages    Cut down on high-fat foods by:  Choosing poultry, fish or lean red meat  Choosing low-fat cooking methods, such as baking, broiling, steaming, grilling and  boiling  Using low-fat or non-fat dairy products  Using vinaigrette, herbs, lemon or fat-free salad dressings  Avoiding fatty meats, such as johnson, sausage, barrera, ribs and luncheon meats  Avoiding high-fat snacks like nuts, chips and chocolate  Avoiding fried foods  Using less butter, margarine, oil and mayonnaise  Avoiding high-fat gravies, cream sauces and cream-based soups    Eat a variety of foods, including:  Fruit and vegetables that are raw, steamed or baked  Whole grains, breads, cereal, rice and pasta  Dairy products, such as low-fat or non-fat milk or yogurt, low-fat cottage cheese and low-fat cheese  Protein-rich foods like chicken, turkey, fish, lean meat and legumes, or beans    Change your eating habits:  Eat three balanced meals a day to help control your hunger  Watch portion sizes and eat small servings of a variety of foods  Choose low-calorie snacks  Eat only when you are hungry and stop when you are satisfied  Eat slowly and try not to perform other tasks while eating  Find other activities to distract you from food, such as walking, taking up a hobby or being involved in the community  Include regular exercise in your daily routine  Find a support group, if necessary, for emotional support in your weight loss effort    Patient Education: Smoking Cessation  Making the commitment to quit smoking is one of the best choices that smokers can make for themselves, but also a difficult one. There are various opportunities for support available. Here is an overview of them.  There are various forms of nicotine replacement therapy available to assist with minimizing these symptoms. They are nicotine gum, nicotine patch, nicotine nasal spray, nicotine inhaler, and nicotine lozenge.  Which kind of nicotine replacement therapy will best work for you can be discussed with your doctor or nurse to help you understand the pros and cons of each.  Non-nicotine replacement therapy is also available. Bupropion  (Wellbutrin, Zyban) is a mild anti-depressant that is also effective in helping people to quit smoking. It can be used alone or with nicotine replacement therapy.   Varenicline (Chantix) is another medication that helps people who what to quit. This medication is not a form of nicotine replacement therapy, but it helps with the withdrawal symptoms.  Studies have shown that the best success rates for people trying to quit include counseling and/or support groups such as the [employer] that is a free, confidential, 24/7, 365-day-a-year treatment referral and information service:  0-508-267-HELP (1806)    Some helpful hints include:  Avoidance. Stay away from smokers and places where you are tempted to smoke.  Activities. Exercise or do hobbies that keep your hands busy.  Alternatives. Use oral substitutes such as sugarless gum, hard candy, and carrot sticks.  Change of habits. For example, if you usually smoke during a coffee break, then go for a walk instead.  Deep breathing. When you have the urge to smoke, do a deep breathing exercise and remind yourself why you quit.  Delay tactic. If you feel that you are about to light up, delay. Tell yourself you must wait 10 minutes. Often this simple trick will allow you to move beyond the urge  Discussion. Call a friend for support.  Drink of water. Use as an oral substitute such as water.  Many people say the reason they smoke is to deal with stress. Unfortunately, stress is a part of all our lives. When quitting, you will need to find new strategies to deal with stress. There are stress-management classes, and self-help books that can help you discover new ways to reduce and deal with stress.  The bottom line is: don't just try to go it alone-reach out for support to help you achieve your goal.

## 2025-07-11 NOTE — CONSULTS
[facility] Card[facility]    Patient Name: Neftali Schmitt  Patient : 1945  Room/Bed: 2307/2307-A    Date: 25  Time: 11:37 AM    Referred by Dr. Myers ref. provider found for Abdominal Pain (RUQ pain radiating from back towards belly button. X3 days. Denies N/V/D. Took laxative this AM for constipation. Recently started on new meds for GERD)     History Of Present Illness:    Neftali Schmitt is a 80 y.o. male who presented to the emergency department with right upper quadrant abdominal pain.  Patient states that around 2 AM he woke up with right-sided pain.  He drink some ginger ale with some improvement of his symptoms but as it was not completely resolved he decided to come to the emergency department for evaluation.  Patient was subsequently found to have evidence of acute cholecystitis.  Patient was seen by general surgery who recommended cholecystostomy tube and antibiotic therapy followed by outpatient surgery in 4 to 6 weeks.  Patient declined undergoing cholecystostomy tube.  Cardiology was consulted for abnormal troponin.  When I went to evaluate the patient he was noted to be tachycardic with telemetry showing what appeared to be atrial flutter with heart rate of 140 bpm.  ECG done this morning showed atrial flutter with a heart rate of 137 bpm.  Started the patient on a heparin infusion and gave IV metoprolol.  Telemetry now showing atrial fibrillation with heart rate in the 120s.  BMP showing a serum sodium of 137, serum potassium 3.6, serum creatinine is 0.94.  INR was 1.1.  Troponin was 58-49.  CBC showed hemoglobin of 13.3. Chest x-ray done 7/10/2025 showed opacity in the right posterior costophrenic angle, possible small enlarging pleural effusion or focal consolidation.  CT scan of the abdomen/pelvis done 7/10/2025 showed distended gallbladder with questionable underlying gallstones, gallbladder wall thickening and mild pericholecystic haziness suggestive of acute  cholecystitis, mild biliary dilatation with the common bile duct measuring up to 10 mm.  Ultrasound of the gallbladder done 7/10/2025 showed gallbladder sludge with borderline gallbladder wall thickening, no cholelithiasis, increased echogenicity of the right renal cortex, no hydronephrosis, renal cyst, diffuse hepatic steatosis.  Nuclear medicine scan done 7/10/2025 was abnormal with findings consistent with acute cholecystitis.  During my exam patient was resting in a bedside chair.    Assessment/Plan:   1.  Paroxysmal atrial fibrillation/flutter  Patient is newly diagnosed with paroxysmal atrial fibrillation/flutter.  Patient started on a heparin infusion for anticoagulation.  Patient given IV beta-blocker to help lower heart rate.  I will increase beta-blocker therapy.  I discussed with him possible CARRI/cardioversion although patient stated he did not want to pursue at this time as he believes his elevated heart rate was related to being upset regarding the recommendation of a cholecystostomy tube.  Will continue to adjust medications for rate control for now.  Will rediscuss rhythm control approach with patient when less upset.    2.  Coronary artery disease/elevated troponin  The patient has a history of coronary artery disease status post bypass done in 2007.  Patient now found to have mildly abnormal troponin of 58-49.  Patient denies any anginal chest discomfort.  ECG done this morning at 848 am showed atrial flutter with a heart rate of 137 bpm and ST segment depression in V2 through V6.  Will check echocardiogram and stress study for additional evaluation.    3.  Carotid artery disease/vertebral artery disease  Stable, continue with risk factor modification    4.  Hypertension  The patient has a history of hypertension which appears under moderate control.  Continue to monitor and adjust antihypertensive medical therapy while here.    5.  Dyslipidemia  Continue statin therapy    6.  Acute  cholecystitis  Patient was seen by general surgery who recommended cholecystostomy tube and antibiotic therapy followed by outpatient surgery in 4 to 6 weeks.    Patient declined undergoing cholecystostomy tube.    Management per general surgery/hospitalist service        Past Medical History:  He has a past medical history of Abnormal auditory perception of both ears (02/09/2023), GERD (gastroesophageal reflux disease), Hyperlipidemia, Hypertension, Immunization not carried out because of patient refusal, Personal history of other diseases of the circulatory system, Personal history of other diseases of the musculoskeletal system and connective tissue, Personal history of other drug therapy, and Severe obesity (BMI 35.0-39.9) with comorbidity (Multi) (09/26/2023).    Past Surgical History:  He has a past surgical history that includes Other surgical history (07/17/2020) and CT angio neck (2/21/2023).      Social History:  He reports that he quit smoking about 35 years ago. His smoking use included cigarettes. He has been exposed to tobacco smoke. He has never used smokeless tobacco. He reports current alcohol use of about 2.0 standard drinks of alcohol per week. He reports that he does not use drugs.    Family History:  Family History[1]     Allergies:  Losartan    Outpatient Medications:  Current Outpatient Medications   Medication Instructions    amLODIPine (NORVASC) 10 mg, oral, Daily    amoxicillin-clavulanate (Augmentin) 875-125 mg tablet 1 tablet, oral, 2 times daily    aspirin 81 mg EC tablet     atorvastatin (LIPITOR) 80 mg, oral, Daily    chlorthalidone (HYGROTON) 25 mg, oral, Daily    cholecalciferol (VITAMIN D3) 5,000 Units, Daily    ezetimibe (ZETIA) 10 mg, oral, Daily    meclizine (ANTIVERT) 25 mg, oral, 3 times daily PRN    metoprolol succinate XL (TOPROL-XL) 25 mg, oral, Daily    metroNIDAZOLE (FLAGYL) 500 mg, oral, 2 times daily, Do not drink alcohol while on this medication.    pantoprazole  (PROTONIX) 40 mg, oral, Daily, Do not crush, chew, or split.        ROS:  A 14 point review of systems was done and is negative other than as stated in HPI    Vitals:  Vitals:    07/10/25 2047 07/11/25 0043 07/11/25 0420 07/11/25 0913   BP: 157/65 113/66 122/73 154/78   BP Location: Left arm Right arm Right arm Right arm   Patient Position: Lying Lying Lying Sitting   Pulse: 81 77 66 73   Resp: 18 18 18 18   Temp: 37 °C (98.6 °F) 36.9 °C (98.5 °F) 36.9 °C (98.5 °F) 36.3 °C (97.3 °F)   TempSrc: Temporal Temporal Temporal Temporal   SpO2: 96% 97% 97% 96%   Weight:       Height:           Physical Exam:     Constitutional: Cooperative, in no acute distress, alert, appears stated age.  Skin: Skin color, texture, turgor normal. No rashes or lesions.  Head: Normocephalic. No masses, lesions, tenderness or abnormalities  Eyes: Extraocular movements are grossly intact.  Mouth and throat: Mucous membranes moist  Neck: Neck supple, no carotid bruits, no JVD  Respiratory: Lungs clear to auscultation, no wheezing or rhonchi, no use of accessory muscles  Chest wall: No scars, normal excursion with respiration  Cardiovascular: Tachycardic, regular rhythm without murmur  Gastrointestinal: Abdomen soft, mild tenderness with palpation. Bowel sounds normal.  Musculoskeletal: Strength equal in upper extremities  Extremities: Trace pitting edema  Neurologic: Sensation grossly intact, alert and oriented ×3    Intake/Output:   I/O last 2 completed shifts:  In: 1067 (11.8 mL/kg) [P.O.:917; IV Piggyback:150]  Out: - (0 mL/kg)   Weight: 90.7 kg     Outpatient Medications  Medications Ordered Prior to Encounter[2]    Scheduled medications  Scheduled Medications[3]  Continuous medications  Continuous Medications[4]  PRN medications  PRN Medications[5]   Prescriptions Prior to Admission[6]    Recent Labs: (past 2 days)  Recent Results (from the past 48 hours)   CBC and Auto Differential    Collection Time: 07/10/25  9:29 AM   Result Value Ref  Range    WBC 17.0 (H) 4.4 - 11.3 x10*3/uL    nRBC 0.0 0.0 - 0.0 /100 WBCs    RBC 4.52 4.50 - 5.90 x10*6/uL    Hemoglobin 14.4 13.5 - 17.5 g/dL    Hematocrit 41.8 41.0 - 52.0 %    MCV 93 80 - 100 fL    MCH 31.9 26.0 - 34.0 pg    MCHC 34.4 32.0 - 36.0 g/dL    RDW 13.7 11.5 - 14.5 %    Platelets 209 150 - 450 x10*3/uL    Neutrophils % 77.6 40.0 - 80.0 %    Immature Granulocytes %, Automated 0.4 0.0 - 0.9 %    Lymphocytes % 10.0 13.0 - 44.0 %    Monocytes % 11.6 2.0 - 10.0 %    Eosinophils % 0.1 0.0 - 6.0 %    Basophils % 0.3 0.0 - 2.0 %    Neutrophils Absolute 13.23 (H) 1.60 - 5.50 x10*3/uL    Immature Granulocytes Absolute, Automated 0.06 0.00 - 0.50 x10*3/uL    Lymphocytes Absolute 1.70 0.80 - 3.00 x10*3/uL    Monocytes Absolute 1.97 (H) 0.05 - 0.80 x10*3/uL    Eosinophils Absolute 0.02 0.00 - 0.40 x10*3/uL    Basophils Absolute 0.05 0.00 - 0.10 x10*3/uL   Comprehensive metabolic panel    Collection Time: 07/10/25  9:29 AM   Result Value Ref Range    Glucose 138 (H) 74 - 99 mg/dL    Sodium 135 (L) 136 - 145 mmol/L    Potassium 3.6 3.5 - 5.3 mmol/L    Chloride 96 (L) 98 - 107 mmol/L    Bicarbonate 29 21 - 32 mmol/L    Anion Gap 14 10 - 20 mmol/L    Urea Nitrogen 15 6 - 23 mg/dL    Creatinine 0.95 0.50 - 1.30 mg/dL    eGFR 80 >60 mL/min/1.73m*2    Calcium 9.5 8.6 - 10.3 mg/dL    Albumin 4.4 3.4 - 5.0 g/dL    Alkaline Phosphatase 68 33 - 136 U/L    Total Protein 7.4 6.4 - 8.2 g/dL    AST 18 9 - 39 U/L    Bilirubin, Total 1.8 (H) 0.0 - 1.2 mg/dL    ALT 20 10 - 52 U/L   Lipase    Collection Time: 07/10/25  9:29 AM   Result Value Ref Range    Lipase 11 9 - 82 U/L   Troponin I, High Sensitivity, Initial    Collection Time: 07/10/25  9:29 AM   Result Value Ref Range    Troponin I, High Sensitivity 58 (HH) 0 - 20 ng/L   Troponin, High Sensitivity, 1 Hour    Collection Time: 07/10/25 10:37 AM   Result Value Ref Range    Troponin I, High Sensitivity 49 (H) 0 - 20 ng/L   Bilirubin, Direct    Collection Time: 07/10/25 10:37 AM    Result Value Ref Range    Bilirubin, Direct 0.3 0.0 - 0.3 mg/dL   CBC    Collection Time: 07/11/25  6:04 AM   Result Value Ref Range    WBC 9.5 4.4 - 11.3 x10*3/uL    nRBC 0.0 0.0 - 0.0 /100 WBCs    RBC 4.26 (L) 4.50 - 5.90 x10*6/uL    Hemoglobin 13.3 (L) 13.5 - 17.5 g/dL    Hematocrit 39.2 (L) 41.0 - 52.0 %    MCV 92 80 - 100 fL    MCH 31.2 26.0 - 34.0 pg    MCHC 33.9 32.0 - 36.0 g/dL    RDW 13.9 11.5 - 14.5 %    Platelets 194 150 - 450 x10*3/uL   Comprehensive Metabolic Panel    Collection Time: 07/11/25  6:04 AM   Result Value Ref Range    Glucose 101 (H) 74 - 99 mg/dL    Sodium 137 136 - 145 mmol/L    Potassium 3.6 3.5 - 5.3 mmol/L    Chloride 99 98 - 107 mmol/L    Bicarbonate 32 21 - 32 mmol/L    Anion Gap 10 10 - 20 mmol/L    Urea Nitrogen 11 6 - 23 mg/dL    Creatinine 0.94 0.50 - 1.30 mg/dL    eGFR 81 >60 mL/min/1.73m*2    Calcium 8.9 8.6 - 10.3 mg/dL    Albumin 3.9 3.4 - 5.0 g/dL    Alkaline Phosphatase 71 33 - 136 U/L    Total Protein 6.5 6.4 - 8.2 g/dL    AST 21 9 - 39 U/L    Bilirubin, Total 1.9 (H) 0.0 - 1.2 mg/dL    ALT 24 10 - 52 U/L   Coagulation Screen    Collection Time: 07/11/25  6:04 AM   Result Value Ref Range    Protime 12.6 (H) 9.8 - 12.4 seconds    INR 1.1 0.9 - 1.1    aPTT 32 26 - 36 seconds   ECG 12 lead    Collection Time: 07/11/25 10:18 AM   Result Value Ref Range    Ventricular Rate 137 BPM    Atrial Rate 138 BPM    PA Interval 76 ms    QRS Duration 90 ms    QT Interval 352 ms    QTC Calculation(Bazett) 532 ms    P Axis -67 degrees    R Axis -7 degrees    QRS Count 22 beats    Q Onset 249 ms    T Offset 425 ms    QTC Fredericia 463 ms   aPTT - baseline    Collection Time: 07/11/25 10:25 AM   Result Value Ref Range    aPTT 35 26 - 36 seconds       CV Studies:  EKG:  Encounter Date: 07/10/25   ECG 12 lead   Result Value    Ventricular Rate 137    Atrial Rate 138    PA Interval 76    QRS Duration 90    QT Interval 352    QTC Calculation(Bazett) 532    P Axis -67    R Axis -7    QRS Count  22    Q Onset 249    T Offset 425    QTC Fredericia 463    Narrative    Sinus or ectopic atrial tachycardia  Repolarization abnormality, prob rate related  Prolonged QT interval     Echocardiogram:   Echocardiogram     Narrative  [place] [id number]  Phone [phone] Fax [phone]    TRANSTHORACIC ECHOCARDIOGRAM REPORT      Patient Name:     ANEUDY MADRIGAL         Nat Physician:  [id number] Nestor MCCORMICK MD  Study Date:       11/8/2021        Referring           [id number] KRISTA MCCLAIN  Physician:  MRN/PID:          [id number]         PCP:  Accession/Order#: [id number]     [facility] Echo Lab  Location:  YOB: 1945        Fellow:  Gender:           M                Nurse:  Admit Date:                        Sonographer:        Veronica Linder RDCS  Admission Status: Outpatient       Additional Staff:  Height:           172.72 cm        CC Report to:  Weight:           95.26 kg         Study Type:         Echocardiogram  BSA:              2.09 m2  Blood Pressure: 120 /70 mmHg    Diagnosis/ICD: R94.31-Abnormal electrocardiogram [ECG] [EKG]; I10-Essential  (primary) hypertension; I25.10-Atherosclerotic heart disease of  native coronary artery without angina pectoris  Indication:    CABG-2008  Procedure/CPT: Echo Complete w Full Doppler-[id number]  Study Detail: The following Echo studies were performed: 2D, M-Mode, Doppler and  color flow.      PHYSICIAN INTERPRETATION:  Left Ventricle: The left ventricular systolic function is normal, with an estimated ejection fraction of 60-65%. There are no regional wall motion abnormalities. The left ventricular cavity size is normal. Spectral Doppler shows a normal pattern of left ventricular diastolic filling.  Left Atrium: The left atrium is mildly dilated.  Right Ventricle: The right ventricle is normal in size. There is normal right ventricular global systolic  function.  Right Atrium: The right atrium is normal in size.  Aortic Valve: The aortic valve is trileaflet. There is no evidence of aortic valve regurgitation.  Mitral Valve: The mitral valve is normal in structure. There is trace mitral valve regurgitation.  Tricuspid Valve: The tricuspid valve is structurally normal. There is mild tricuspid regurgitation.  Pulmonic Valve: The pulmonic valve is structurally normal. There is physiologic pulmonic valve regurgitation.  Pericardium: There is no pericardial effusion noted.  Aorta: The aortic root is abnormal. There is mild dilatation of the ascending aorta.      CONCLUSIONS:  1. The left ventricular systolic function is normal with a 60-65% estimated ejection fraction.    QUANTITATIVE DATA SUMMARY:  2D MEASUREMENTS:  Normal Ranges:  IVSd:          1.00 cm   (0.6-1.1cm)  LVPWd:         1.10 cm   (0.6-1.1cm)  LVIDd:         5.10 cm   (3.9-5.9cm)  LVIDs:         3.50 cm  LV Mass Index: 96.2 g/m2  LV % FS        31.4 %    LA VOLUME:  Normal Ranges:  LA Vol A4C:        62.1 ml    (22+/-6mL/m2)  LA Vol A2C:        68.4 ml  LA Vol BP:         69.0 ml  LA Vol Index A4C:  29.8ml/m2  LA Vol Index A2C:  32.8 ml/m2  LA Vol Index BP:   33.1 ml/m2  LA Area A4C:       21.8 cm2  LA Area A2C:       21.6 cm2  LA Major Axis A4C: 6.5 cm  LA Major Axis A2C: 5.8 cm  LA Volume Index:   32.9 ml/m2    RA VOLUME BY A/L METHOD:  Normal Ranges:  RA Area A4C: 12.5 cm2    M-MODE MEASUREMENTS:  Normal Ranges:  Ao Root: 4.00 cm (2.0-3.7cm)  AoV Exc: 2.00 cm (1.5-2.5cm)  LAs:     3.70 cm (2.7-4.0cm)    AORTA MEASUREMENTS:  Normal Ranges:  AoV Exc:     2.00 cm (1.5-2.5cm)  Ao Sinus, d: 3.38 cm (2.1-3.5cm)  Ao STJ, d:   2.90 cm (1.7-3.4cm)  Asc Ao, d:   3.90 cm (2.1-3.4cm)    LV SYSTOLIC FUNCTION BY 2D PLANIMETRY (MOD):  Normal Ranges:  EF-A4C View: 68.9 % (>55%)  EF-A2C View: 56.9 %  EF-Biplane:  66.1 %    LV DIASTOLIC FUNCTION:  Normal Ranges:  MV Peak E:        0.82 m/s    (0.7-1.2 m/s)  MV Peak A:         0.62 m/s    (0.42-0.7 m/s)  E/A Ratio:        1.32        (1.0-2.2)  MV e'             0.12 m/s    (>8.0)  MV lateral e'     0.11 m/s  MV medial e'      0.12 m/s  MV A Dur:         100.00 msec  E/e' Ratio:       7.12        (<8.0)  LV IVRT:          82 msec     (<100ms)  PulmV A Revs Dur: 127.00 msec    MITRAL VALVE:  Normal Ranges:  MV DT: 201 msec (150-240msec)    AORTIC VALVE:  Normal Ranges:  LVOT Max Bernard:  0.94 m/s (<1.1m/s)  LVOT VTI:      24.20 cm  LVOT Diameter: 2.10 cm  (1.8-2.4cm)    RIGHT VENTRICLE:  RV 1   3.6 cm  RV 2   2.3 cm  RV 3   7.4 cm  TAPSE: 25.0 mm  RV s'  0.18 m/s    TRICUSPID VALVE/RVSP:  Normal Ranges:  Peak TR Velocity: 2.59 m/s  RV Syst Pressure: 29.8 mmHg (< 30mmHg)  TV E Vmax:        0.48 m/s  (0.3-0.7m/s)  TV A Vmax:        0.47 m/s  IVC Diam:         0.90 cm    PULMONIC VALVE:  Normal Ranges:  PIEDV: 1.34 m/s  PADP:  10.2 mmHg    Pulmonary Veins:  PulmV A Revs Dur: 127.00 msec      [id number] Nestor Humphrey MD  Electronically signed on 11/9/2021 at 3:05:25 PM         Final     Stress Testing IMGRESULT([id number]:1:1825): No results found for this or any previous visit from the past 1825 days.    Cardiac Catheterization: No results found for this or any previous visit from the past 1825 days.  No results found for this or any previous visit from the past 3650 days.     Cardiac Scoring: No results found for this or any previous visit from the past 1825 days.    AAA : No results found for this or any previous visit from the past 1825 days.    OTHER: No results found for this or any previous visit from the past 1825 days.    LAST IMAGING RESULTS  ECG 12 lead  Sinus or ectopic atrial tachycardia  Repolarization abnormality, prob rate related  Prolonged QT interval        James Stewart DO, FACC, FACOI  7/11/2025  11:37 AM         [1]   Family History  Problem Relation Name Age of Onset    Cancer Mother      Cancer Father     [2]   No current facility-administered medications on file prior to  encounter.     Current Outpatient Medications on File Prior to Encounter   Medication Sig Dispense Refill    amLODIPine (Norvasc) 10 mg tablet Take 1 tablet (10 mg) by mouth once daily. 90 tablet 1    aspirin 81 mg EC tablet       atorvastatin (Lipitor) 80 mg tablet Take 1 tablet (80 mg) by mouth once daily. 90 tablet 1    chlorthalidone (Hygroton) 25 mg tablet Take 1 tablet (25 mg) by mouth once daily. 90 tablet 1    cholecalciferol (Vitamin D3) 5,000 Units tablet Take 1 tablet (5,000 Units) by mouth once daily.      ezetimibe (Zetia) 10 mg tablet Take 1 tablet (10 mg) by mouth once daily. 90 tablet 1    meclizine (Antivert) 25 mg tablet Take 1 tablet (25 mg) by mouth 3 times a day as needed for dizziness. 30 tablet 0    metoprolol succinate XL (Toprol-XL) 25 mg 24 hr tablet Take 1 tablet (25 mg) by mouth once daily. 90 tablet 1    pantoprazole (ProtoNix) 40 mg EC tablet Take 1 tablet (40 mg) by mouth once daily. Do not crush, chew, or split. 30 tablet 1    [DISCONTINUED] cyclobenzaprine (Flexeril) 10 mg tablet Take by mouth 2 times a day as needed for muscle spasms.      [DISCONTINUED] naproxen (EC Naprosyn) 500 mg EC tablet Take 1 tablet (500 mg) by mouth 2 times daily (morning and late afternoon). Do not crush, chew, or split.PRN     [3] amLODIPine, 10 mg, oral, Daily  aspirin, 81 mg, oral, Daily  atorvastatin, 80 mg, oral, Nightly  chlorthalidone, 25 mg, oral, Daily  cholecalciferol, 125 mcg, oral, Daily  ezetimibe, 10 mg, oral, Daily  metoprolol succinate XL, 25 mg, oral, Daily  pantoprazole, 40 mg, oral, Daily before breakfast   Or  pantoprazole, 40 mg, intravenous, Daily before breakfast  piperacillin-tazobactam, 3.375 g, intravenous, q6h  polyethylene glycol, 17 g, oral, Daily    [4] heparin, 0-4,500 Units/hr, Last Rate: 1,600 Units/hr (07/11/25 1048)    [5] PRN medications: bisacodyl, bisacodyl, guaiFENesin, heparin, melatonin, morphine, ondansetron **OR** ondansetron  [6]   Medications Prior to Admission    Medication Sig Dispense Refill Last Dose/Taking    amLODIPine (Norvasc) 10 mg tablet Take 1 tablet (10 mg) by mouth once daily. 90 tablet 1 7/10/2025 Morning    aspirin 81 mg EC tablet    7/10/2025 Morning    atorvastatin (Lipitor) 80 mg tablet Take 1 tablet (80 mg) by mouth once daily. 90 tablet 1 7/9/2025    chlorthalidone (Hygroton) 25 mg tablet Take 1 tablet (25 mg) by mouth once daily. 90 tablet 1 7/9/2025    cholecalciferol (Vitamin D3) 5,000 Units tablet Take 1 tablet (5,000 Units) by mouth once daily.   7/9/2025    ezetimibe (Zetia) 10 mg tablet Take 1 tablet (10 mg) by mouth once daily. 90 tablet 1 7/9/2025    meclizine (Antivert) 25 mg tablet Take 1 tablet (25 mg) by mouth 3 times a day as needed for dizziness. 30 tablet 0 7/9/2025    metoprolol succinate XL (Toprol-XL) 25 mg 24 hr tablet Take 1 tablet (25 mg) by mouth once daily. 90 tablet 1 7/10/2025    pantoprazole (ProtoNix) 40 mg EC tablet Take 1 tablet (40 mg) by mouth once daily. Do not crush, chew, or split. 30 tablet 1 7/9/2025

## 2025-07-11 NOTE — NURSING NOTE
1400:  Report received from Divya JIMENEZ RN    1410:  Patient admitted to floor, introduced myself to the patient. Educated them on room, bed, call light, and medical equipment. Telemetry applied. Vital signs obtained. Patient assessed at this time and all orders reviewed      1430:  Patient back in SR, rate of 60s. Slight tenderness in RUQ with palpation but pain mostly gone. On a heparin drip at 16ml/hr next assay at 1500. Patient waiting for stress test, which will likely be Monday. He is unhappy with news and doesn't want to stay all weekend. Educated patient about risks of leaving AMA.    1545:  Heparin assay sent-0.9 orders to decrease rate from 16ml/hr to 14ml/hr, next assay at 2000    1700:  Family arrived and talked to patient who is now willing to stay for stress test.

## 2025-07-11 NOTE — PROGRESS NOTES
Social work consult placed for discharge planning. SW reviewed pt's chart and communicated with TCC. No SW needs foreseen at this time. SW signing off; available upon request.    Rodolfo Erwin, MSW, LSW ([id number])   Care Transitions

## 2025-07-12 ENCOUNTER — PHARMACY VISIT (OUTPATIENT)
Dept: PHARMACY | Facility: CLINIC | Age: 80
End: 2025-07-12
Payer: COMMERCIAL

## 2025-07-12 LAB
ANION GAP SERPL CALC-SCNC: 14 MMOL/L (ref 10–20)
BUN SERPL-MCNC: 15 MG/DL (ref 6–23)
CALCIUM SERPL-MCNC: 6.9 MG/DL (ref 8.6–10.3)
CHLORIDE SERPL-SCNC: 104 MMOL/L (ref 98–107)
CO2 SERPL-SCNC: 26 MMOL/L (ref 21–32)
CREAT SERPL-MCNC: 0.91 MG/DL (ref 0.5–1.3)
EGFRCR SERPLBLD CKD-EPI 2021: 84 ML/MIN/1.73M*2
ERYTHROCYTE [DISTWIDTH] IN BLOOD BY AUTOMATED COUNT: 13.6 % (ref 11.5–14.5)
GLUCOSE SERPL-MCNC: 107 MG/DL (ref 74–99)
HCT VFR BLD AUTO: 34 % (ref 41–52)
HGB BLD-MCNC: 11.8 G/DL (ref 13.5–17.5)
MAGNESIUM SERPL-MCNC: 1.64 MG/DL (ref 1.6–2.4)
MCH RBC QN AUTO: 31.8 PG (ref 26–34)
MCHC RBC AUTO-ENTMCNC: 34.7 G/DL (ref 32–36)
MCV RBC AUTO: 92 FL (ref 80–100)
NRBC BLD-RTO: 0 /100 WBCS (ref 0–0)
PLATELET # BLD AUTO: 175 X10*3/UL (ref 150–450)
POTASSIUM SERPL-SCNC: 2.7 MMOL/L (ref 3.5–5.3)
RBC # BLD AUTO: 3.71 X10*6/UL (ref 4.5–5.9)
SODIUM SERPL-SCNC: 141 MMOL/L (ref 136–145)
UFH PPP CHRO-ACNC: 0.5 IU/ML (ref ?–1.1)
UFH PPP CHRO-ACNC: 0.5 IU/ML (ref ?–1.1)
WBC # BLD AUTO: 7 X10*3/UL (ref 4.4–11.3)

## 2025-07-12 PROCEDURE — 99233 SBSQ HOSP IP/OBS HIGH 50: CPT | Performed by: PHYSICIAN ASSISTANT

## 2025-07-12 PROCEDURE — 2500000002 HC RX 250 W HCPCS SELF ADMINISTERED DRUGS (ALT 637 FOR MEDICARE OP, ALT 636 FOR OP/ED): Performed by: STUDENT IN AN ORGANIZED HEALTH CARE EDUCATION/TRAINING PROGRAM

## 2025-07-12 PROCEDURE — 80048 BASIC METABOLIC PNL TOTAL CA: CPT | Performed by: PHYSICIAN ASSISTANT

## 2025-07-12 PROCEDURE — 2500000002 HC RX 250 W HCPCS SELF ADMINISTERED DRUGS (ALT 637 FOR MEDICARE OP, ALT 636 FOR OP/ED): Performed by: NURSE PRACTITIONER

## 2025-07-12 PROCEDURE — 2500000004 HC RX 250 GENERAL PHARMACY W/ HCPCS (ALT 636 FOR OP/ED): Performed by: INTERNAL MEDICINE

## 2025-07-12 PROCEDURE — 2500000001 HC RX 250 WO HCPCS SELF ADMINISTERED DRUGS (ALT 637 FOR MEDICARE OP): Performed by: INTERNAL MEDICINE

## 2025-07-12 PROCEDURE — 85520 HEPARIN ASSAY: CPT | Performed by: INTERNAL MEDICINE

## 2025-07-12 PROCEDURE — 85027 COMPLETE CBC AUTOMATED: CPT | Performed by: PHYSICIAN ASSISTANT

## 2025-07-12 PROCEDURE — 2500000001 HC RX 250 WO HCPCS SELF ADMINISTERED DRUGS (ALT 637 FOR MEDICARE OP): Performed by: NURSE PRACTITIONER

## 2025-07-12 PROCEDURE — 2500000004 HC RX 250 GENERAL PHARMACY W/ HCPCS (ALT 636 FOR OP/ED): Performed by: STUDENT IN AN ORGANIZED HEALTH CARE EDUCATION/TRAINING PROGRAM

## 2025-07-12 PROCEDURE — 36415 COLL VENOUS BLD VENIPUNCTURE: CPT | Performed by: INTERNAL MEDICINE

## 2025-07-12 PROCEDURE — 83735 ASSAY OF MAGNESIUM: CPT | Performed by: PHYSICIAN ASSISTANT

## 2025-07-12 PROCEDURE — 2060000001 HC INTERMEDIATE ICU ROOM DAILY

## 2025-07-12 PROCEDURE — 2500000004 HC RX 250 GENERAL PHARMACY W/ HCPCS (ALT 636 FOR OP/ED): Performed by: NURSE PRACTITIONER

## 2025-07-12 RX ORDER — MAGNESIUM SULFATE HEPTAHYDRATE 40 MG/ML
2 INJECTION, SOLUTION INTRAVENOUS ONCE
Status: COMPLETED | OUTPATIENT
Start: 2025-07-12 | End: 2025-07-12

## 2025-07-12 RX ORDER — POTASSIUM CHLORIDE 20 MEQ/1
40 TABLET, EXTENDED RELEASE ORAL ONCE
Status: COMPLETED | OUTPATIENT
Start: 2025-07-12 | End: 2025-07-12

## 2025-07-12 RX ORDER — POTASSIUM CHLORIDE 14.9 MG/ML
20 INJECTION INTRAVENOUS
Status: COMPLETED | OUTPATIENT
Start: 2025-07-12 | End: 2025-07-12

## 2025-07-12 RX ADMIN — METOPROLOL SUCCINATE 25 MG: 25 TABLET, EXTENDED RELEASE ORAL at 19:55

## 2025-07-12 RX ADMIN — CHOLECALCIFEROL TAB 125 MCG (5000 UNIT) 125 MCG: 125 TAB at 08:49

## 2025-07-12 RX ADMIN — METOPROLOL SUCCINATE 25 MG: 25 TABLET, EXTENDED RELEASE ORAL at 08:49

## 2025-07-12 RX ADMIN — PANTOPRAZOLE SODIUM 40 MG: 40 INJECTION, POWDER, FOR SOLUTION INTRAVENOUS at 06:43

## 2025-07-12 RX ADMIN — HEPARIN SODIUM 1400 UNITS/HR: 10000 INJECTION, SOLUTION INTRAVENOUS at 00:08

## 2025-07-12 RX ADMIN — POLYETHYLENE GLYCOL 3350 17 G: 17 POWDER, FOR SOLUTION ORAL at 08:48

## 2025-07-12 RX ADMIN — POTASSIUM CHLORIDE 20 MEQ: 14.9 INJECTION, SOLUTION INTRAVENOUS at 06:34

## 2025-07-12 RX ADMIN — ASPIRIN 81 MG: 81 TABLET, DELAYED RELEASE ORAL at 08:49

## 2025-07-12 RX ADMIN — PIPERACILLIN SODIUM AND TAZOBACTAM SODIUM 3.38 G: 3; .375 INJECTION, SOLUTION INTRAVENOUS at 21:24

## 2025-07-12 RX ADMIN — POTASSIUM CHLORIDE EXTENDED-RELEASE 40 MEQ: 1500 TABLET ORAL at 06:34

## 2025-07-12 RX ADMIN — EZETIMIBE 10 MG: 10 TABLET ORAL at 08:49

## 2025-07-12 RX ADMIN — POTASSIUM CHLORIDE 20 MEQ: 14.9 INJECTION, SOLUTION INTRAVENOUS at 08:30

## 2025-07-12 RX ADMIN — MAGNESIUM SULFATE HEPTAHYDRATE 2 G: 40 INJECTION, SOLUTION INTRAVENOUS at 12:51

## 2025-07-12 RX ADMIN — PIPERACILLIN SODIUM AND TAZOBACTAM SODIUM 3.38 G: 3; .375 INJECTION, SOLUTION INTRAVENOUS at 12:06

## 2025-07-12 RX ADMIN — CHLORTHALIDONE 25 MG: 25 TABLET ORAL at 08:48

## 2025-07-12 RX ADMIN — HEPARIN SODIUM 1400 UNITS/HR: 10000 INJECTION, SOLUTION INTRAVENOUS at 20:00

## 2025-07-12 RX ADMIN — GUAIFENESIN 600 MG: 600 TABLET ORAL at 08:50

## 2025-07-12 RX ADMIN — PIPERACILLIN SODIUM AND TAZOBACTAM SODIUM 3.38 G: 3; .375 INJECTION, SOLUTION INTRAVENOUS at 01:55

## 2025-07-12 RX ADMIN — AMLODIPINE BESYLATE 10 MG: 10 TABLET ORAL at 08:49

## 2025-07-12 RX ADMIN — ATORVASTATIN CALCIUM 80 MG: 40 TABLET, FILM COATED ORAL at 19:55

## 2025-07-12 ASSESSMENT — ENCOUNTER SYMPTOMS
SORE THROAT: 0
FACIAL SWELLING: 0
NUMBNESS: 0
ORTHOPNEA: 0
DIZZINESS: 0
HALLUCINATIONS: 0
COUGH: 0
FEVER: 0
ABDOMINAL PAIN: 0
TROUBLE SWALLOWING: 0
BRUISES/BLEEDS EASILY: 0
SHORTNESS OF BREATH: 0
DIARRHEA: 0
PALPITATIONS: 0
FLANK PAIN: 0
CHEST TIGHTNESS: 0
APPETITE CHANGE: 0
FATIGUE: 0
CONSTIPATION: 0
WHEEZING: 0
DIAPHORESIS: 0
BACK PAIN: 0
VOMITING: 0
JOINT SWELLING: 0
WOUND: 0
DYSURIA: 0
WEAKNESS: 0
CHILLS: 0
BLOOD IN STOOL: 0
HEADACHES: 0
FREQUENCY: 0
HEMATURIA: 0
LIGHT-HEADEDNESS: 0
EYE PAIN: 0
NAUSEA: 0

## 2025-07-12 ASSESSMENT — PAIN - FUNCTIONAL ASSESSMENT
PAIN_FUNCTIONAL_ASSESSMENT: 0-10
PAIN_FUNCTIONAL_ASSESSMENT: 0-10

## 2025-07-12 ASSESSMENT — PAIN SCALES - GENERAL
PAINLEVEL_OUTOF10: 0 - NO PAIN

## 2025-07-12 NOTE — PROGRESS NOTES
Physical Therapy                 Therapy Communication Note- Screen    Patient Name: Neftali Schmitt  MRN: [id number]  Department: [facility]   Room: 2030/2030-A  Today's Date: 7/12/2025     Discipline: Physical Therapy    Comment:  9:02-9:08  Chart reviewed and spoke with patient. Pt has been ambulating independently in room without Assistive device. Pt has no concerns with mobility and feels he is at his baseline function. No acute PT needs identified, orders discontinued at this time.

## 2025-07-12 NOTE — PROGRESS NOTES
Neftali Schmitt is a 80 y.o. male on day 2 of admission presenting with Calculus of gallbladder with acute cholecystitis without obstruction.      Subjective   Neftali Schmitt is a 80 y.o. male with PMHx s/f GERD coronary artery disease status post CABG, borderline diabetes, carotid and vertebral artery disease, hypertension, dyslipidemia presented 7/10/25 with right upper quadrant pain.  Patient frequently has fairly severe GERD symptoms does typically run from his throat down to his epigastrium.  This pain and discomfort he started having that morning was much worse and located differently.  Starting more laterally in the right epigastrium into the middle epigastrium the patient developed sharp pain.  He did have some nausea.  No vomiting.  He denies any fevers but has had some chills for the last couple days.  The patient has not noticed any pain similar to this associated with any meals prior to this.  He is denying any chest pain or shortness of breath no diarrhea or urinary symptoms.  In the emergency department the initial CT scan was suggestive of acute cholecystitis the ultrasound was not suggestive of cholecystitis. The ED provider discussed the case with surgery HIDA scan was ordered.  Surgery advised to place the patient on antibiotic therapy for now and if needed patient might need to go for cholecystotomy tube and cholecystectomy at a later time. alk phos 68, ALT 20, AST 18, bilirubin 1.8.    7/11/2025: No acute events overnight. Vitals stable, afebrile. CBC/CMP reviewed, bili 1.9. HSTI 58>>49. No leukocytosis. HIDA abnormal with acute cholecystitis with obstructive cystic duct. Surgery ordered IR guided cholecystostomy tube however patient declined.  General surgery planning antibiotic therapy only for now with delayed cholecystectomy given his advanced age and underlying cardiac issues also given the fact that he did respond well in regards to pain and normalization of his white blood cell  count with antibiotics alone.  Patient advised that he does have an increased risk of failure with oral antibiotics alone, he verbalized understanding.  He has an office appointment arranged with Dr. Mata for 7/22/2025  HR noted to be 130's, ECG obtained revealed paroxysmal atrial fibrillation/flutter- new diagnosis, Dr. Stewart was in to see patient and gave IV metoprolol x1 and started on heparin drip, per his note he offered CARRI/cardioversion although patient stated he did not want to pursue at this time as he believes his elevated heart rate was related to being upset regarding the recommendation of a cholecystostomy tube. Continue with rate control only for now, can consider rhythm control if needed in the future     Addendum: Patient converted back to NSR with heart rate in the 60s.  He is again asking for discharge to home.  I discussed the case with his cardiologist Dr. Stewart who saw him this morning as patient is scheduled for a stress test however unfortunately he had caffeine this morning so cannot have the stress test inpatient until Monday as the we do not run these on the weekend.  Dr. Stewart advises that stress test should be completed prior to discharge rather than as an outpatient as patient with known CAD history, was having chest pain with arrhythmia, ST depression on ECG with arrhythmia, and mildly abnormal troponin on admission. Patient would need to leave AGAINST MEDICAL ADVICE if he is not willing to complete workup.     7/12/2025: No acute events overnight. Vitals stable, afebrile, HR 60's. CBC/BMP reviewed, K 2.7 with mag 1.64- both replaced by nocturnist. No leukocytosis. Echo with poorly visualized anatomical structures due to suboptimal image quality. EF 40-45%. Stress test Monday     Rounded on patient on this date with RN     Review of Systems   Constitutional:  Negative for appetite change, chills, diaphoresis, fatigue and fever.   HENT:  Negative for congestion, ear pain, facial  swelling, hearing loss, nosebleeds, sore throat, tinnitus and trouble swallowing.    Eyes:  Negative for pain.   Respiratory:  Negative for cough, chest tightness, shortness of breath and wheezing.    Cardiovascular:  Negative for chest pain, palpitations and leg swelling.   Gastrointestinal:  Negative for abdominal pain, blood in stool, constipation, diarrhea, nausea and vomiting.   Genitourinary:  Negative for dysuria, flank pain, frequency, hematuria and urgency.   Musculoskeletal:  Negative for back pain and joint swelling.   Skin:  Negative for rash and wound.   Neurological:  Negative for dizziness, syncope, weakness, light-headedness, numbness and headaches.   Hematological:  Does not bruise/bleed easily.   Psychiatric/Behavioral:  Negative for behavioral problems, hallucinations and suicidal ideas.           Objective     Last Recorded Vitals  /75 (BP Location: Left arm, Patient Position: Lying)   Pulse 60   Temp 35.9 °C (96.6 °F) (Temporal)   Resp 18   Wt 91.7 kg (202 lb 2.6 oz)   SpO2 95%     Image Results  Imaging  NM hepatobiliary w cholecystokinin  Result Date: 7/10/2025  Abnormal hepatobiliary imaging, finding compatible with acute cholecystitis with obstructive cystic duct Normal hepatic uptake function and patent biliary system     MACRO: Critical Finding:  See findings. Notification was initiated on 7/10/2025 at 4:31 pm by  Tabatha oRmo.  (**-OCF-**) Instructions:   Signed by: Tabatha Romo 7/10/2025 4:33 PM Dictation workstation:   [id number]    US gallbladder  Result Date: 7/10/2025  Gallbladder sludge with borderline gallbladder wall thickening.  No cholelithiasis, gallbladder wall thickening, or biliary dilatation is appreciated.  Given the findings on CT, consider further evaluation with a HIDA scan. Increased echogenicity of the right renal cortex consistent with intrinsic renal disease.  No hydronephrosis. Right renal cyst measuring 5.3 x 2.6 x 4.1 cm. Diffuse hepatic steatosis.  Signed by Juan Antonio Rodarte MD    CT abdomen pelvis w IV contrast  Result Date: 7/10/2025  1.Distended gallbladder with questionable underlying gallstones, gallbladder wall thickening and mild pericholecystic haziness. Findings suggestive of acute cholecystitis.  Recommend further evaluation with right upper quadrant ultrasound. 2.Mild biliary dilatation with the common bile duct measuring up to 9 to 10 mm without definite choledocholithiasis. Signed by Juan Antonio Rodarte MD    XR chest 2 views  Result Date: 7/10/2025  Opacity in the right posterior costophrenic angle appears increased, possibly small enlarging pleural effusion or focal consolidation. Suggest radiographic follow-up to resolution.   MACRO: None   Signed by: Lewis Chang 7/10/2025 10:02 AM Dictation workstation:   [id number]      Cardiology, Vascular, and Other Imaging  Transthoracic Echo Complete  Result Date: 7/11/2025              [place] [address]      Phone [phone] Fax [phone] TRANSTHORACIC ECHOCARDIOGRAM REPORT Patient Name:       ANEUDY MCCORMICK   Reading Physician:    [id number] Magdaleno MD Study Date:         7/11/2025            Ordering Provider:    [id number] LEOPOLDO COHN MRN/PID:            [id number]             Fellow: Accession#:         [id number]         Nurse: Date of Birth/Age:  1945 / 80 years Sonographer:          Lorna Mosher RDCS Gender Assigned at                      Additional Staff: Birth: Height:             167.64 cm            Admit Date:           7/10/2025 Weight:             90.72 kg             Admission Status:     Inpatient -                                                                Routine BSA / BMI:          2.00 m2 / 32.28      Department Location:   [facility] [place] [facility]                     kg/m2 Blood Pressure: 154 /78 mmHg Study Type:    TRANSTHORACIC ECHO (TTE) COMPLETE Diagnosis/ICD: Shortness of breath-R06.02 Indication:    CHEST PAIN CPT Codes:     Echo Complete w Full Doppler-[id number] Patient History: Pertinent History: CAD and HTN. Study Detail: The following Echo studies were performed: 2D, M-Mode, Doppler and               color flow. Technically challenging study due to prominent lung               artifact.  PHYSICIAN INTERPRETATION: Left Ventricle: The left ventricular systolic function is mildly decreased with a visually estimated ejection fraction of 40-45%. The left ventricular cavity size is normal. There is mild increased septal and normal posterior left ventricular wall thickness. There is left ventricular concentric remodeling. Left ventricular diastolic filling was indeterminate. Left Atrium: The left atrial size is normal. Right Ventricle: The right ventricle was not well visualized. RV not well visualized. Right Atrium: The right atrial size is normal. Aortic Valve: The aortic valve is trileaflet. The aortic valve area by VTI is 1.98 cmï¿½ with a peak velocity of 1.26 m/s. The peak and mean gradients are 6 mmHg and 3 mmHg, respectively, with a dimensionless index of 0.63. There is mild aortic valve cusp calcification. There is trace aortic valve regurgitation. Mitral Valve: The mitral valve is normal in structure. There is mild to moderate mitral valve regurgitation. The E Vmax is 1.07 m/s. Tricuspid Valve: The tricuspid valve is structurally normal. There is trace tricuspid regurgitation. The Doppler estimated right ventricular systolic pressure (RVSP) is within normal limits at 24 mmHg. Pulmonic Valve: The pulmonic valve is structurally normal. There is no indication of pulmonic valve regurgitation. Pericardium: No pericardial effusion noted. Aorta: The aortic root is normal. Systemic Veins: The inferior vena cava was not well visualized, IVC  inspiratory collapse is not well visualized.  CONCLUSIONS:  1. Poorly visualized anatomical structures due to suboptimal image quality.  2. The left ventricular systolic function is mildly decreased with a visually estimated ejection fraction of 40-45%.  3. Left ventricular diastolic filling was indeterminate.  4. Mild to moderate mitral valve regurgitation.  5. The Doppler estimated RVSP is within normal limits at 24 mmHg. QUANTITATIVE DATA SUMMARY:  2D MEASUREMENTS:             Normal Ranges: LAs:             4.05 cm     (2.7-4.0cm) IVSd:            1.10 cm     (0.6-1.1cm) LVPWd:           0.97 cm     (0.6-1.1cm) LVIDd:           4.54 cm     (3.9-5.9cm) LVIDs:           3.82 cm LV Mass Index:   82.0 g/m2 LVEDV Index:     26.79 ml/m2 LV % FS          15.9 %  LEFT ATRIUM:                  Normal Ranges: LA Vol A4C:        39.6 ml    (22+/-6mL/m2) LA Vol A2C:        37.3 ml LA Vol BP:         40.3 ml LA Vol Index A4C:  19.8ml/m2 LA Vol Index A2C:  18.7 ml/m2 LA Vol Index BP:   20.2 ml/m2 LA Area A4C:       15.1 cm2 LA Area A2C:       15.4 cm2 LA Major Axis A4C: 4.9 cm LA Major Axis A2C: 5.4 cm LA Volume Index:   20.2 ml/m2 LA Vol A4C:        38.0 ml LA Vol A2C:        36.6 ml LA Vol Index BSA:  18.7 ml/m2  RIGHT ATRIUM:          Normal Ranges: RA Area A4C:  14.4 cm2  AORTA MEASUREMENTS:         Normal Ranges: Ao Sinus, d:        3.20 cm (2.1-3.5cm) Ao STJ, d:          3.20 cm (1.7-3.4cm) Asc Ao, d:          3.50 cm (2.1-3.4cm)  LV SYSTOLIC FUNCTION:                      Normal Ranges: EF-A4C View:    38 % (>=55%) EF-A2C View:    40 % EF-Biplane:     35 % EF-Visual:      43 % LV EF Reported: 43 %  LV DIASTOLIC FUNCTION:           Normal Ranges: MV Peak E:             1.07 m/s  (0.7-1.2 m/s) MV e'                  0.083 m/s (>8.0) MV lateral e'          0.08 m/s MV medial e'           0.09 m/s E/e' Ratio:            12.86     (<8.0)  MITRAL VALVE:          Normal Ranges: MV DT:        141 msec (150-240msec)  MITRAL  INSUFFICIENCY:             Normal Ranges: MR VTI:               143.17 cm MR Vmax:              496.16 cm/s  AORTIC VALVE:                     Normal Ranges: AoV Vmax:                1.26 m/s (<=1.7m/s) AoV Peak P.3 mmHg (<20mmHg) AoV Mean PG:             3.5 mmHg (1.7-11.5mmHg) LVOT Max Bernard:            0.70 m/s (<=1.1m/s) AoV VTI:                 22.13 cm (18-25cm) LVOT VTI:                14.02 cm LVOT Diameter:           1.99 cm  (1.8-2.4cm) AoV Area, VTI:           1.98 cm2 (2.5-5.5cm2) AoV Area,Vmax:           1.75 cm2 (2.5-4.5cm2) AoV Dimensionless Index: 0.63  RIGHT VENTRICLE: TAPSE: 20.1 mm RV s'  0.11 m/s  TRICUSPID VALVE/RVSP:          Normal Ranges: Peak TR Velocity:     2.29 m/s Est. RA Pressure:     3 mmHg RV Syst Pressure:     24 mmHg  (< 30mmHg)  AORTA: Asc Ao Diam 3.52 cm  [id number] Bradly Magdaleno MD Electronically signed on 2025 at 5:31:44 PM  ** Final **     ECG 12 lead  Result Date: 2025  Atrial flutter/fibrillation with RVR Repolarization abnormality, prob rate related Prolonged QT interval Confirmed by James Stewart ([id number]) on 2025 1:02:34 PM         Lab Results  Results for orders placed or performed during the hospital encounter of 07/10/25 (from the past 24 hours)   ECG 12 lead   Result Value Ref Range    Ventricular Rate 137 BPM    Atrial Rate 138 BPM    NM Interval 76 ms    QRS Duration 90 ms    QT Interval 352 ms    QTC Calculation(Bazett) 532 ms    P Axis -67 degrees    R Axis -7 degrees    QRS Count 22 beats    Q Onset 249 ms    T Offset 425 ms    QTC Fredericia 463 ms   aPTT - baseline   Result Value Ref Range    aPTT 35 26 - 36 seconds   Transthoracic Echo Complete   Result Value Ref Range    LVOT diam 1.99 cm    LV Biplane EF 35 %    Tricuspid annular plane systolic excursion 2.0 cm    AV mn grad 3 mmHg    LA vol index A/L 20.2 ml/m2    AV pk bernard 1.26 m/s    LV EF 43 %    RV free wall pk S' 10.81 cm/s    RVSP 24 mmHg    LVIDd 4.54 cm    Aortic Valve Area by  Continuity of VTI 1.98 cm2    Aortic Valve Area by Continuity of Peak Velocity 1.75 cm2    AV pk grad 6 mmHg    LV A4C EF 37.5    Heparin Assay, UFH   Result Value Ref Range    Heparin Unfractionated 0.9 See Comment Below for Therapeutic Ranges IU/mL   Heparin Assay, UFH   Result Value Ref Range    Heparin Unfractionated 0.7 See Comment Below for Therapeutic Ranges IU/mL   Heparin Assay, UFH   Result Value Ref Range    Heparin Unfractionated 0.5 See Comment Below for Therapeutic Ranges IU/mL   Basic Metabolic Panel   Result Value Ref Range    Glucose 107 (H) 74 - 99 mg/dL    Sodium 141 136 - 145 mmol/L    Potassium 2.7 (LL) 3.5 - 5.3 mmol/L    Chloride 104 98 - 107 mmol/L    Bicarbonate 26 21 - 32 mmol/L    Anion Gap 14 10 - 20 mmol/L    Urea Nitrogen 15 6 - 23 mg/dL    Creatinine 0.91 0.50 - 1.30 mg/dL    eGFR 84 >60 mL/min/1.73m*2    Calcium 6.9 (L) 8.6 - 10.3 mg/dL   CBC   Result Value Ref Range    WBC 7.0 4.4 - 11.3 x10*3/uL    nRBC 0.0 0.0 - 0.0 /100 WBCs    RBC 3.71 (L) 4.50 - 5.90 x10*6/uL    Hemoglobin 11.8 (L) 13.5 - 17.5 g/dL    Hematocrit 34.0 (L) 41.0 - 52.0 %    MCV 92 80 - 100 fL    MCH 31.8 26.0 - 34.0 pg    MCHC 34.7 32.0 - 36.0 g/dL    RDW 13.6 11.5 - 14.5 %    Platelets 175 150 - 450 x10*3/uL   Magnesium   Result Value Ref Range    Magnesium 1.64 1.60 - 2.40 mg/dL   Heparin Assay, UFH   Result Value Ref Range    Heparin Unfractionated 0.5 See Comment Below for Therapeutic Ranges IU/mL        Medications  Scheduled medications:  Scheduled Medications[1]  Continuous medications:  Continuous Medications[2]  PRN medications:  PRN Medications[3]     Physical Exam  Constitutional:       General: He is not in acute distress.     Appearance: Normal appearance.      Comments: Sitting upright in bedside chair   HENT:      Head: Normocephalic and atraumatic.      Right Ear: External ear normal.      Left Ear: External ear normal.      Nose: Nose normal.      Mouth/Throat:      Mouth: Mucous membranes are  moist.      Pharynx: Oropharynx is clear.   Eyes:      Extraocular Movements: Extraocular movements intact.      Conjunctiva/sclera: Conjunctivae normal.      Pupils: Pupils are equal, round, and reactive to light.   Cardiovascular:      Rate and Rhythm: Tachycardia present. Rhythm irregular.      Pulses: Normal pulses.      Heart sounds: Normal heart sounds.   Pulmonary:      Effort: Pulmonary effort is normal. No respiratory distress.      Breath sounds: Normal breath sounds. No wheezing, rhonchi or rales.   Abdominal:      General: Bowel sounds are normal.      Palpations: Abdomen is soft.      Tenderness: There is no abdominal tenderness (RUQ). There is no right CVA tenderness, left CVA tenderness, guarding or rebound.      Comments: No further abdominal tenderness   Musculoskeletal:         General: No swelling. Normal range of motion.      Cervical back: Normal range of motion and neck supple.   Skin:     General: Skin is warm and dry.      Capillary Refill: Capillary refill takes less than 2 seconds.      Findings: No rash.   Neurological:      General: No focal deficit present.      Mental Status: He is alert and oriented to person, place, and time. Mental status is at baseline.   Psychiatric:         Mood and Affect: Mood normal.         Behavior: Behavior normal.                  Assessment/Plan   This patient currently has cardiac telemetry ordered; if you would like to modify or discontinue the telemetry order, click here to go to the orders activity to modify/discontinue the order.  Acute cholecystitis  CT scan suggestive of acute cholecystitis, US undetermined, HIDA confirms dx  Surgery consulted  IV zosyn  Diet per general surgery service  As needed morphine and Zofran   Monitor fever/WBC curve  Total bili slightly elevated, direct bili WNL  Surgery ordered IR guided cholecystostomy tube however patient declined  General surgery planning antibiotic therapy only for now with delayed cholecystectomy  given his advanced age and underlying cardiac issues also given the fact that he did respond well in regards to pain and normalization of his white blood cell count with antibiotics alone  Patient advised that he does have an increased risk of failure with oral antibiotics alone, he verbalized understanding  He has an office appointment arranged with Dr. Mata for 7/22/2025 to discuss scheduling gallbladder surgery    Paroxysmal atrial fibrillation/flutter-newly diagnosed  ECG done this morning at 848 am showed atrial flutter with a heart rate of 137 bpm and ST segment depression in V2 through V6.   Heparin drip  IV beta-blocker x1  Increase PO metoprolol dose  Transfer to SDU for new arrhythmia and closer monitoring  Tele monitoring   Echo  Cardiology on board, offered CARRI but patient declined at this time- reevaluate possibility of rhythm control at a later time    Back to normal sinus rhythm 7/11/25    Hypokalemia, severe  Monitor on tele  Supplement PO and IV  Supplement magnesium as well    Coronary artery disease status post CABG   Mildly elevated troponin  Asa, statin, BB  Troponin leak thought secondary to demand ischemia patient does not appear to have active chest pain   ECG nonischemic  Check echocardiogram and consult cardiology in consideration the patient may eventually need surgical intervention   Stress test ordered-this will be done on Monday, 7/14/2025    HTN  Continue home medications  Monitor BP  Adjust as needed     DLD  Statin as above  Monitor LFTs    Code Status: Full Code                 DVT ppx: SCDs, SQH     Please see orders for more complete plan    Lauren Griffin PA-C           [1] amLODIPine, 10 mg, oral, Daily  aspirin, 81 mg, oral, Daily  atorvastatin, 80 mg, oral, Nightly  chlorthalidone, 25 mg, oral, Daily  cholecalciferol, 125 mcg, oral, Daily  ezetimibe, 10 mg, oral, Daily  magnesium sulfate, 2 g, intravenous, Once  metoprolol succinate XL, 25 mg, oral, BID  pantoprazole,  40 mg, oral, Daily before breakfast   Or  pantoprazole, 40 mg, intravenous, Daily before breakfast  perflutren lipid microspheres, 0.5-10 mL of dilution, intravenous, Once in imaging  perflutren protein A microsphere, 0.5 mL, intravenous, Once in imaging  piperacillin-tazobactam, 3.375 g, intravenous, q6h  polyethylene glycol, 17 g, oral, Daily  potassium chloride, 20 mEq, intravenous, q2h  regadenoson, 0.4 mg, intravenous, Once in imaging  sulfur hexafluoride microsphr, 2 mL, intravenous, Once in imaging     [2] heparin, 0-4,500 Units/hr, Last Rate: 1,400 Units/hr (07/12/25 0008)     [3] PRN medications: aminophylline, bisacodyl, bisacodyl, guaiFENesin, heparin, melatonin, morphine, ondansetron **OR** ondansetron

## 2025-07-12 NOTE — CARE PLAN
The patient's goals for the shift include adequate pain control    The clinical goals for the shift include decrease anxiety, adequate pain control      Problem: Pain - Adult  Goal: Verbalizes/displays adequate comfort level or baseline comfort level  Outcome: Progressing     Problem: Safety - Adult  Goal: Free from fall injury  Outcome: Progressing     Problem: Discharge Planning  Goal: Discharge to home or other facility with appropriate resources  Outcome: Progressing     Problem: Chronic Conditions and Co-morbidities  Goal: Patient's chronic conditions and co-morbidity symptoms are monitored and maintained or improved  Outcome: Progressing

## 2025-07-12 NOTE — PROGRESS NOTES
Occupational Therapy Screen and DC                 Therapy Communication Note    Patient Name: Neftali Schmitt  MRN: [id number]  Department: [facility]  Room: 2030/2030-A  Today's Date: 7/12/2025     Discipline: Occupational Therapy    Pt is independent with ADLs and functional mobility. No acute OT needs at this time. Will discontinue orders at this time. Pt verbalized understanding and agreement. Please re-consult if status changes.       07/12/25 at 11:16 AM - COLETTE KATZ OT

## 2025-07-12 NOTE — PROGRESS NOTES
PROGRESS NOTE    HPI:  Neftali Schmitt is a 80 y.o. male who presented to the emergency department with right upper quadrant abdominal pain.  Patient states that around 2 AM he woke up with right-sided pain.  He drink some ginger ale with some improvement of his symptoms but as it was not completely resolved he decided to come to the emergency department for evaluation.  Patient was subsequently found to have evidence of acute cholecystitis.  Patient was seen by general surgery who recommended cholecystostomy tube and antibiotic therapy followed by outpatient surgery in 4 to 6 weeks.  Patient declined undergoing cholecystostomy tube.  Cardiology was consulted for abnormal troponin.  When I went to evaluate the patient he was noted to be tachycardic with telemetry showing what appeared to be atrial flutter with heart rate of 140 bpm.  ECG done this morning showed atrial flutter with a heart rate of 137 bpm.  Started the patient on a heparin infusion and gave IV metoprolol.  Telemetry now showing atrial fibrillation with heart rate in the 120s.  BMP showing a serum sodium of 137, serum potassium 3.6, serum creatinine is 0.94.  INR was 1.1.  Troponin was 58-49.  CBC showed hemoglobin of 13.3. Chest x-ray done 7/10/2025 showed opacity in the right posterior costophrenic angle, possible small enlarging pleural effusion or focal consolidation.  CT scan of the abdomen/pelvis done 7/10/2025 showed distended gallbladder with questionable underlying gallstones, gallbladder wall thickening and mild pericholecystic haziness suggestive of acute cholecystitis, mild biliary dilatation with the common bile duct measuring up to 10 mm.  Ultrasound of the gallbladder done 7/10/2025 showed gallbladder sludge with borderline gallbladder wall thickening, no cholelithiasis, increased echogenicity of the right renal cortex, no hydronephrosis, renal cyst, diffuse hepatic steatosis.  Nuclear medicine scan done 7/10/2025 was abnormal with  findings consistent with acute cholecystitis.  During my exam patient was resting in a bedside chair.     Subjective Data:  Patient telemetry shows that he has converted back to sinus rhythm.  Patient has no cardiac symptoms such as chest pain palpitations or shortness of breath.  If he pushes on his belly hard enough still some mild right upper quadrant pain but while I am talking to him he is eating pancakes and sausage with no GI complaints.  He remains on a heparin infusion and plan is for stress testing on Monday.    Overnight Events:    None     Objective Data:  Last Recorded Vitals:  Vitals:    07/11/25 2039 07/11/25 2346 07/12/25 0345 07/12/25 0703   BP: 119/74 126/68 114/65 138/75   BP Location: Left arm Left arm Left arm Left arm   Patient Position: Lying Lying Lying Lying   Pulse: 65 73 62 60   Resp: 17 17 17 18   Temp: 36.6 °C (97.9 °F) 37.2 °C (99 °F) 36.4 °C (97.5 °F) 35.9 °C (96.6 °F)   TempSrc: Temporal Temporal Temporal Temporal   SpO2: 95% 95% 94% 95%   Weight:   91.7 kg (202 lb 2.6 oz)    Height:           Last Labs:  CBC - 7/12/2025:  5:05 AM  7.0 11.8 175    34.0      CMP - 7/12/2025:  5:05 AM  6.9 6.5 21 --- 1.9   _ 3.9 24 71      PTT - 7/11/2025: 10:25 AM  1.1   12.6 35     Last I/O:  I/O last 3 completed shifts:  In: 1218.9 (13.3 mL/kg) [P.O.:917; I.V.:151.9 (1.7 mL/kg); IV Piggyback:150]  Out: 0 (0 mL/kg)   Weight: 91.7 kg     Past Cardiology Tests (Last 3 Years):  Echo: CONCLUSIONS:   1. Poorly visualized anatomical structures due to suboptimal image quality.   2. The left ventricular systolic function is mildly decreased with a visually estimated ejection fraction of 40-45%.   3. Left ventricular diastolic filling was indeterminate.   4. Mild to moderate mitral valve regurgitation.   5. The Doppler estimated RVSP is within normal limits at 24 mmHg.  Noted above change in EF dating back to echo from 2021 when his echo showed normal LV systolic function    Inpatient Medications:  Scheduled  Medications[1]    Review of Systems   Constitutional: Negative for fever and malaise/fatigue.   Cardiovascular:  Negative for chest pain, orthopnea and palpitations.   Respiratory:  Negative for shortness of breath and wheezing.    Skin:  Negative for itching and rash.   Gastrointestinal:  Negative for abdominal pain, diarrhea, nausea and vomiting.   Genitourinary:  Negative for dysuria.   Neurological:  Negative for weakness.        Physical Exam  Constitutional:       General: He is not in acute distress.  HENT:      Mouth/Throat:      Mouth: Mucous membranes are moist.   Neck:      Comments: Flat neck veins  Cardiovascular:      Rate and Rhythm: Normal rate and regular rhythm.      Heart sounds: Normal heart sounds. No murmur heard.  Pulmonary:      Effort: Pulmonary effort is normal.      Breath sounds: Normal breath sounds.   Abdominal:      General: Abdomen is flat. Bowel sounds are normal.      Palpations: Abdomen is soft.      Comments: Very mild tenderness right upper quadrant   Musculoskeletal:         General: No swelling.   Skin:     General: Skin is warm and dry.   Neurological:      Mental Status: He is alert and oriented to person, place, and time.   Psychiatric:         Mood and Affect: Mood normal.        ASSESSMENT/PLAN  1.  Paroxysmal atrial fibrillation/flutter  Patient is newly diagnosed with paroxysmal atrial fibrillation/flutter.  Patient started on a heparin infusion for anticoagulation.  Patient given IV beta-blocker to help lower heart rate.  I will increase beta-blocker therapy.  I discussed with him possible CARRI/cardioversion although patient stated he did not want to pursue at this time as he believes his elevated heart rate was related to being upset regarding the recommendation of a cholecystostomy tube.  Will continue to adjust medications for rate control for now.  Will rediscuss rhythm control approach with patient when less upset.  7-12-25: Patient back in sinus rhythm.  No cardiac  complaints otherwise.  Continue heparin infusion until we get stress results on Monday and if there is no evidence of ischemia can transition to Eliquis.     2.  Coronary artery disease/elevated troponin  The patient has a history of coronary artery disease status post bypass done in 2007.  Patient now found to have mildly abnormal troponin of 58-49.  Patient denies any anginal chest discomfort.  ECG done this morning at 848 am showed atrial flutter with a heart rate of 137 bpm and ST segment depression in V2 through V6.  Will check echocardiogram and stress study for additional evaluation.  7-12-25:  No chest pain or angina however his echocardiogram shows EF of 40 to 45% which is changed from his last echo in 2021 at which time EF was normal.  No specific wall motion abnormalities noted on the echo     3.  Carotid artery disease/vertebral artery disease  Stable, continue with risk factor modification     4.  Hypertension  The patient has a history of hypertension which appears under moderate control.  Continue to monitor and adjust antihypertensive medical therapy while here.  7-12-25: Blood pressure is well-controlled on current meds.     5.  Dyslipidemia  Continue statin therapy     6.  Acute cholecystitis  Patient was seen by general surgery who recommended cholecystostomy tube and antibiotic therapy followed by outpatient surgery in 4 to 6 weeks.    Patient declined undergoing cholecystostomy tube.    Management per general surgery/hospitalist service  7-12-25: Patient eating normal diet with no significant GI symptoms although he still has mild right upper quadrant tenderness.  Patient is scheduled as an outpatient for cholecystectomy on 22 July.  Again we will get the stress test on Monday for further restratification.  Recommendations--patient back in sinus rhythm.  Continue heparin infusion until we know the results of the stress test on Monday.  He does have change in his overall EF to 40 to 45% compared  to echo in 2021.  No overt CHF.  Blood pressures are controlled.  Patient with some hypokalemia and replacement has been ordered via IV and orally .  Repeat labs in AM.            Eze Langston PA-C  7/12/2025  10:30 AM         [1]   Scheduled medications   Medication Dose Route Frequency    amLODIPine  10 mg oral Daily    aspirin  81 mg oral Daily    atorvastatin  80 mg oral Nightly    chlorthalidone  25 mg oral Daily    cholecalciferol  125 mcg oral Daily    ezetimibe  10 mg oral Daily    magnesium sulfate  2 g intravenous Once    metoprolol succinate XL  25 mg oral BID    pantoprazole  40 mg oral Daily before breakfast    Or    pantoprazole  40 mg intravenous Daily before breakfast    perflutren lipid microspheres  0.5-10 mL of dilution intravenous Once in imaging    perflutren protein A microsphere  0.5 mL intravenous Once in imaging    piperacillin-tazobactam  3.375 g intravenous q6h    polyethylene glycol  17 g oral Daily    regadenoson  0.4 mg intravenous Once in imaging    sulfur hexafluoride microsphr  2 mL intravenous Once in imaging

## 2025-07-13 VITALS
SYSTOLIC BLOOD PRESSURE: 134 MMHG | OXYGEN SATURATION: 94 % | HEART RATE: 59 BPM | WEIGHT: 201.72 LBS | DIASTOLIC BLOOD PRESSURE: 62 MMHG | BODY MASS INDEX: 32.42 KG/M2 | HEIGHT: 66 IN | RESPIRATION RATE: 18 BRPM | TEMPERATURE: 97.8 F

## 2025-07-13 LAB
ANION GAP SERPL CALC-SCNC: 13 MMOL/L (ref 10–20)
BUN SERPL-MCNC: 13 MG/DL (ref 6–23)
CALCIUM SERPL-MCNC: 8.7 MG/DL (ref 8.6–10.3)
CHLORIDE SERPL-SCNC: 104 MMOL/L (ref 98–107)
CO2 SERPL-SCNC: 24 MMOL/L (ref 21–32)
CREAT SERPL-MCNC: 0.97 MG/DL (ref 0.5–1.3)
EGFRCR SERPLBLD CKD-EPI 2021: 78 ML/MIN/1.73M*2
ERYTHROCYTE [DISTWIDTH] IN BLOOD BY AUTOMATED COUNT: 13.6 % (ref 11.5–14.5)
GLUCOSE SERPL-MCNC: 111 MG/DL (ref 74–99)
HCT VFR BLD AUTO: 38 % (ref 41–52)
HGB BLD-MCNC: 12.9 G/DL (ref 13.5–17.5)
MAGNESIUM SERPL-MCNC: 2.2 MG/DL (ref 1.6–2.4)
MCH RBC QN AUTO: 31.3 PG (ref 26–34)
MCHC RBC AUTO-ENTMCNC: 33.9 G/DL (ref 32–36)
MCV RBC AUTO: 92 FL (ref 80–100)
NRBC BLD-RTO: 0 /100 WBCS (ref 0–0)
PLATELET # BLD AUTO: 207 X10*3/UL (ref 150–450)
POTASSIUM SERPL-SCNC: 3.5 MMOL/L (ref 3.5–5.3)
RBC # BLD AUTO: 4.12 X10*6/UL (ref 4.5–5.9)
SODIUM SERPL-SCNC: 137 MMOL/L (ref 136–145)
UFH PPP CHRO-ACNC: 0.7 IU/ML (ref ?–1.1)
WBC # BLD AUTO: 8 X10*3/UL (ref 4.4–11.3)

## 2025-07-13 PROCEDURE — 2060000001 HC INTERMEDIATE ICU ROOM DAILY

## 2025-07-13 PROCEDURE — 99233 SBSQ HOSP IP/OBS HIGH 50: CPT | Performed by: INTERNAL MEDICINE

## 2025-07-13 PROCEDURE — 2500000002 HC RX 250 W HCPCS SELF ADMINISTERED DRUGS (ALT 637 FOR MEDICARE OP, ALT 636 FOR OP/ED): Performed by: NURSE PRACTITIONER

## 2025-07-13 PROCEDURE — 2500000004 HC RX 250 GENERAL PHARMACY W/ HCPCS (ALT 636 FOR OP/ED): Performed by: INTERNAL MEDICINE

## 2025-07-13 PROCEDURE — 85027 COMPLETE CBC AUTOMATED: CPT | Performed by: INTERNAL MEDICINE

## 2025-07-13 PROCEDURE — 36415 COLL VENOUS BLD VENIPUNCTURE: CPT | Performed by: INTERNAL MEDICINE

## 2025-07-13 PROCEDURE — 2500000001 HC RX 250 WO HCPCS SELF ADMINISTERED DRUGS (ALT 637 FOR MEDICARE OP): Performed by: INTERNAL MEDICINE

## 2025-07-13 PROCEDURE — 99232 SBSQ HOSP IP/OBS MODERATE 35: CPT | Performed by: PHYSICIAN ASSISTANT

## 2025-07-13 PROCEDURE — 85520 HEPARIN ASSAY: CPT | Performed by: INTERNAL MEDICINE

## 2025-07-13 PROCEDURE — 2500000001 HC RX 250 WO HCPCS SELF ADMINISTERED DRUGS (ALT 637 FOR MEDICARE OP): Performed by: NURSE PRACTITIONER

## 2025-07-13 PROCEDURE — 80048 BASIC METABOLIC PNL TOTAL CA: CPT | Performed by: PHYSICIAN ASSISTANT

## 2025-07-13 PROCEDURE — 83735 ASSAY OF MAGNESIUM: CPT | Performed by: PHYSICIAN ASSISTANT

## 2025-07-13 PROCEDURE — 2500000004 HC RX 250 GENERAL PHARMACY W/ HCPCS (ALT 636 FOR OP/ED): Performed by: NURSE PRACTITIONER

## 2025-07-13 RX ADMIN — CHOLECALCIFEROL TAB 125 MCG (5000 UNIT) 125 MCG: 125 TAB at 08:28

## 2025-07-13 RX ADMIN — AMLODIPINE BESYLATE 10 MG: 10 TABLET ORAL at 08:28

## 2025-07-13 RX ADMIN — PIPERACILLIN SODIUM AND TAZOBACTAM SODIUM 3.38 G: 3; .375 INJECTION, SOLUTION INTRAVENOUS at 02:51

## 2025-07-13 RX ADMIN — EZETIMIBE 10 MG: 10 TABLET ORAL at 08:31

## 2025-07-13 RX ADMIN — ASPIRIN 81 MG: 81 TABLET, DELAYED RELEASE ORAL at 08:31

## 2025-07-13 RX ADMIN — PIPERACILLIN SODIUM AND TAZOBACTAM SODIUM 3.38 G: 3; .375 INJECTION, SOLUTION INTRAVENOUS at 08:36

## 2025-07-13 RX ADMIN — PIPERACILLIN SODIUM AND TAZOBACTAM SODIUM 3.38 G: 3; .375 INJECTION, SOLUTION INTRAVENOUS at 16:06

## 2025-07-13 RX ADMIN — METOPROLOL SUCCINATE 25 MG: 25 TABLET, EXTENDED RELEASE ORAL at 08:31

## 2025-07-13 RX ADMIN — CHLORTHALIDONE 25 MG: 25 TABLET ORAL at 08:31

## 2025-07-13 RX ADMIN — PANTOPRAZOLE SODIUM 40 MG: 40 TABLET, DELAYED RELEASE ORAL at 06:24

## 2025-07-13 RX ADMIN — PIPERACILLIN SODIUM AND TAZOBACTAM SODIUM 3.38 G: 3; .375 INJECTION, SOLUTION INTRAVENOUS at 20:19

## 2025-07-13 RX ADMIN — ATORVASTATIN CALCIUM 80 MG: 40 TABLET, FILM COATED ORAL at 20:19

## 2025-07-13 RX ADMIN — METOPROLOL SUCCINATE 25 MG: 25 TABLET, EXTENDED RELEASE ORAL at 20:19

## 2025-07-13 RX ADMIN — HEPARIN SODIUM 1400 UNITS/HR: 10000 INJECTION, SOLUTION INTRAVENOUS at 13:17

## 2025-07-13 ASSESSMENT — ENCOUNTER SYMPTOMS
TROUBLE SWALLOWING: 0
FLANK PAIN: 0
EYE PAIN: 0
VOMITING: 0
JOINT SWELLING: 0
SORE THROAT: 0
DIZZINESS: 0
FATIGUE: 0
BRUISES/BLEEDS EASILY: 0
LIGHT-HEADEDNESS: 0
APPETITE CHANGE: 0
ABDOMINAL PAIN: 0
HALLUCINATIONS: 0
DYSURIA: 0
WHEEZING: 0
DIAPHORESIS: 0
HEADACHES: 0
WEAKNESS: 0
CHEST TIGHTNESS: 0
COUGH: 0
BLOOD IN STOOL: 0
PALPITATIONS: 0
CHILLS: 0
NAUSEA: 0
SHORTNESS OF BREATH: 0
CONSTIPATION: 0
FACIAL SWELLING: 0
DIARRHEA: 0
FEVER: 0
BACK PAIN: 0
HEMATURIA: 0
WOUND: 0
FREQUENCY: 0
NUMBNESS: 0
ORTHOPNEA: 0

## 2025-07-13 ASSESSMENT — PAIN - FUNCTIONAL ASSESSMENT
PAIN_FUNCTIONAL_ASSESSMENT: 0-10

## 2025-07-13 ASSESSMENT — PAIN SCALES - GENERAL
PAINLEVEL_OUTOF10: 0 - NO PAIN

## 2025-07-13 NOTE — PROGRESS NOTES
Neftali Schmitt is a 80 y.o. male on day 3 of admission presenting with Calculus of gallbladder with acute cholecystitis without obstruction.      Subjective   Neftali Schmitt is a 80 y.o. male with PMHx s/f GERD coronary artery disease status post CABG, borderline diabetes, carotid and vertebral artery disease, hypertension, dyslipidemia presented 7/10/25 with right upper quadrant pain.  Patient frequently has fairly severe GERD symptoms does typically run from his throat down to his epigastrium.  This pain and discomfort he started having that morning was much worse and located differently.  Starting more laterally in the right epigastrium into the middle epigastrium the patient developed sharp pain.  He did have some nausea.  No vomiting.  He denies any fevers but has had some chills for the last couple days.  The patient has not noticed any pain similar to this associated with any meals prior to this.  He is denying any chest pain or shortness of breath no diarrhea or urinary symptoms.  In the emergency department the initial CT scan was suggestive of acute cholecystitis the ultrasound was not suggestive of cholecystitis. The ED provider discussed the case with surgery HIDA scan was ordered.  Surgery advised to place the patient on antibiotic therapy for now and if needed patient might need to go for cholecystotomy tube and cholecystectomy at a later time. alk phos 68, ALT 20, AST 18, bilirubin 1.8.    7/11/2025: No acute events overnight. Vitals stable, afebrile. CBC/CMP reviewed, bili 1.9. HSTI 58>>49. No leukocytosis. HIDA abnormal with acute cholecystitis with obstructive cystic duct. Surgery ordered IR guided cholecystostomy tube however patient declined.  General surgery planning antibiotic therapy only for now with delayed cholecystectomy given his advanced age and underlying cardiac issues also given the fact that he did respond well in regards to pain and normalization of his white blood cell  count with antibiotics alone.  Patient advised that he does have an increased risk of failure with oral antibiotics alone, he verbalized understanding.  He has an office appointment arranged with Dr. Mata for 7/22/2025  HR noted to be 130's, ECG obtained revealed paroxysmal atrial fibrillation/flutter- new diagnosis, Dr. Stewart was in to see patient and gave IV metoprolol x1 and started on heparin drip, per his note he offered CARRI/cardioversion although patient stated he did not want to pursue at this time as he believes his elevated heart rate was related to being upset regarding the recommendation of a cholecystostomy tube. Continue with rate control only for now, can consider rhythm control if needed in the future     Addendum: Patient converted back to NSR with heart rate in the 60s.  He is again asking for discharge to home.  I discussed the case with his cardiologist Dr. Stewart who saw him this morning as patient is scheduled for a stress test however unfortunately he had caffeine this morning so cannot have the stress test inpatient until Monday as the we do not run these on the weekend.  Dr. Stewart advises that stress test should be completed prior to discharge rather than as an outpatient as patient with known CAD history, was having chest pain with arrhythmia, ST depression on ECG with arrhythmia, and mildly abnormal troponin on admission. Patient would need to leave AGAINST MEDICAL ADVICE if he is not willing to complete workup.     7/12/2025: No acute events overnight. Vitals stable, afebrile, HR 60's. CBC/BMP reviewed, K 2.7 with mag 1.64- both replaced by nocturnist. No leukocytosis. Echo with poorly visualized anatomical structures due to suboptimal image quality. EF 40-45%. Stress test Monday  Rounded on patient on this date with RN     7/13: Pain well controlled. He is without acute complaint.     Review of Systems   Constitutional:  Negative for appetite change, chills, diaphoresis, fatigue and fever.    HENT:  Negative for congestion, ear pain, facial swelling, hearing loss, nosebleeds, sore throat, tinnitus and trouble swallowing.    Eyes:  Negative for pain.   Respiratory:  Negative for cough, chest tightness, shortness of breath and wheezing.    Cardiovascular:  Negative for chest pain, palpitations and leg swelling.   Gastrointestinal:  Negative for abdominal pain, blood in stool, constipation, diarrhea, nausea and vomiting.   Genitourinary:  Negative for dysuria, flank pain, frequency, hematuria and urgency.   Musculoskeletal:  Negative for back pain and joint swelling.   Skin:  Negative for rash and wound.   Neurological:  Negative for dizziness, syncope, weakness, light-headedness, numbness and headaches.   Hematological:  Does not bruise/bleed easily.   Psychiatric/Behavioral:  Negative for behavioral problems, hallucinations and suicidal ideas.         Objective     Last Recorded Vitals  /66 (BP Location: Left arm, Patient Position: Lying)   Pulse 56   Temp 35.8 °C (96.5 °F) (Temporal)   Resp 17   Wt 91.5 kg (201 lb 11.5 oz)   SpO2 95%     Image Results  Imaging  NM hepatobiliary w cholecystokinin  Result Date: 7/10/2025  Abnormal hepatobiliary imaging, finding compatible with acute cholecystitis with obstructive cystic duct Normal hepatic uptake function and patent biliary system     MACRO: Critical Finding:  See findings. Notification was initiated on 7/10/2025 at 4:31 pm by  Tabatha Romo.  (**-OCF-**) Instructions:   Signed by: Tabatha Romo 7/10/2025 4:33 PM Dictation workstation:   [id number]     gallbladder  Result Date: 7/10/2025  Gallbladder sludge with borderline gallbladder wall thickening.  No cholelithiasis, gallbladder wall thickening, or biliary dilatation is appreciated.  Given the findings on CT, consider further evaluation with a HIDA scan. Increased echogenicity of the right renal cortex consistent with intrinsic renal disease.  No hydronephrosis. Right renal cyst  measuring 5.3 x 2.6 x 4.1 cm. Diffuse hepatic steatosis. Signed by Juan Antonio Rodarte MD    CT abdomen pelvis w IV contrast  Result Date: 7/10/2025  1.Distended gallbladder with questionable underlying gallstones, gallbladder wall thickening and mild pericholecystic haziness. Findings suggestive of acute cholecystitis.  Recommend further evaluation with right upper quadrant ultrasound. 2.Mild biliary dilatation with the common bile duct measuring up to 9 to 10 mm without definite choledocholithiasis. Signed by Juan Antonio Rodarte MD    XR chest 2 views  Result Date: 7/10/2025  Opacity in the right posterior costophrenic angle appears increased, possibly small enlarging pleural effusion or focal consolidation. Suggest radiographic follow-up to resolution.   MACRO: None   Signed by: Lewis Chang 7/10/2025 10:02 AM Dictation workstation:   [id number]      Cardiology, Vascular, and Other Imaging  Transthoracic Echo Complete  Result Date: 7/11/2025              [place] [address]      Phone [phone] Fax [phone] TRANSTHORACIC ECHOCARDIOGRAM REPORT Patient Name:       ANEUDY MCCORMICK   Reading Physician:    [id number] Bradly Magdaleno MD Study Date:         7/11/2025            Ordering Provider:    [id number] LEOPOLDO COHN MRN/PID:            [id number]             Fellow: Accession#:         [id number]         Nurse: Date of Birth/Age:  1945 / 80 years Sonographer:          Lorna Mosher RDCS Gender Assigned at  M                    Additional Staff: Birth: Height:             167.64 cm            Admit Date:           7/10/2025 Weight:             90.72 kg             Admission Status:     Inpatient -                                                                Routine BSA / BMI:           2.00 m2 / 32.28      Department Location:  [facility] [address]                     kg/m2 Blood Pressure: 154 /78 mmHg Study Type:    TRANSTHORACIC ECHO (TTE) COMPLETE Diagnosis/ICD: Shortness of breath-R06.02 Indication:    CHEST PAIN CPT Codes:     Echo Complete w Full [id number] Patient History: Pertinent History: CAD and HTN. Study Detail: The following Echo studies were performed: 2D, M-Mode, Doppler and               color flow. Technically challenging study due to prominent lung               artifact.  PHYSICIAN INTERPRETATION: Left Ventricle: The left ventricular systolic function is mildly decreased with a visually estimated ejection fraction of 40-45%. The left ventricular cavity size is normal. There is mild increased septal and normal posterior left ventricular wall thickness. There is left ventricular concentric remodeling. Left ventricular diastolic filling was indeterminate. Left Atrium: The left atrial size is normal. Right Ventricle: The right ventricle was not well visualized. RV not well visualized. Right Atrium: The right atrial size is normal. Aortic Valve: The aortic valve is trileaflet. The aortic valve area by VTI is 1.98 cmï¿½ with a peak velocity of 1.26 m/s. The peak and mean gradients are 6 mmHg and 3 mmHg, respectively, with a dimensionless index of 0.63. There is mild aortic valve cusp calcification. There is trace aortic valve regurgitation. Mitral Valve: The mitral valve is normal in structure. There is mild to moderate mitral valve regurgitation. The E Vmax is 1.07 m/s. Tricuspid Valve: The tricuspid valve is structurally normal. There is trace tricuspid regurgitation. The Doppler estimated right ventricular systolic pressure (RVSP) is within normal limits at 24 mmHg. Pulmonic Valve: The pulmonic valve is structurally normal. There is no indication of pulmonic valve regurgitation. Pericardium: No pericardial effusion noted. Aorta: The aortic root is normal. Systemic Veins: The  inferior vena cava was not well visualized, IVC inspiratory collapse is not well visualized.  CONCLUSIONS:  1. Poorly visualized anatomical structures due to suboptimal image quality.  2. The left ventricular systolic function is mildly decreased with a visually estimated ejection fraction of 40-45%.  3. Left ventricular diastolic filling was indeterminate.  4. Mild to moderate mitral valve regurgitation.  5. The Doppler estimated RVSP is within normal limits at 24 mmHg. QUANTITATIVE DATA SUMMARY:  2D MEASUREMENTS:             Normal Ranges: LAs:             4.05 cm     (2.7-4.0cm) IVSd:            1.10 cm     (0.6-1.1cm) LVPWd:           0.97 cm     (0.6-1.1cm) LVIDd:           4.54 cm     (3.9-5.9cm) LVIDs:           3.82 cm LV Mass Index:   82.0 g/m2 LVEDV Index:     26.79 ml/m2 LV % FS          15.9 %  LEFT ATRIUM:                  Normal Ranges: LA Vol A4C:        39.6 ml    (22+/-6mL/m2) LA Vol A2C:        37.3 ml LA Vol BP:         40.3 ml LA Vol Index A4C:  19.8ml/m2 LA Vol Index A2C:  18.7 ml/m2 LA Vol Index BP:   20.2 ml/m2 LA Area A4C:       15.1 cm2 LA Area A2C:       15.4 cm2 LA Major Axis A4C: 4.9 cm LA Major Axis A2C: 5.4 cm LA Volume Index:   20.2 ml/m2 LA Vol A4C:        38.0 ml LA Vol A2C:        36.6 ml LA Vol Index BSA:  18.7 ml/m2  RIGHT ATRIUM:          Normal Ranges: RA Area A4C:  14.4 cm2  AORTA MEASUREMENTS:         Normal Ranges: Ao Sinus, d:        3.20 cm (2.1-3.5cm) Ao STJ, d:          3.20 cm (1.7-3.4cm) Asc Ao, d:          3.50 cm (2.1-3.4cm)  LV SYSTOLIC FUNCTION:                      Normal Ranges: EF-A4C View:    38 % (>=55%) EF-A2C View:    40 % EF-Biplane:     35 % EF-Visual:      43 % LV EF Reported: 43 %  LV DIASTOLIC FUNCTION:           Normal Ranges: MV Peak E:             1.07 m/s  (0.7-1.2 m/s) MV e'                  0.083 m/s (>8.0) MV lateral e'          0.08 m/s MV medial e'           0.09 m/s E/e' Ratio:            12.86     (<8.0)  MITRAL VALVE:          Normal Ranges:  MV DT:        141 msec (150-240msec)  MITRAL INSUFFICIENCY:             Normal Ranges: MR VTI:               143.17 cm MR Vmax:              496.16 cm/s  AORTIC VALVE:                     Normal Ranges: AoV Vmax:                1.26 m/s (<=1.7m/s) AoV Peak P.3 mmHg (<20mmHg) AoV Mean PG:             3.5 mmHg (1.7-11.5mmHg) LVOT Max Bernard:            0.70 m/s (<=1.1m/s) AoV VTI:                 22.13 cm (18-25cm) LVOT VTI:                14.02 cm LVOT Diameter:           1.99 cm  (1.8-2.4cm) AoV Area, VTI:           1.98 cm2 (2.5-5.5cm2) AoV Area,Vmax:           1.75 cm2 (2.5-4.5cm2) AoV Dimensionless Index: 0.63  RIGHT VENTRICLE: TAPSE: 20.1 mm RV s'  0.11 m/s  TRICUSPID VALVE/RVSP:          Normal Ranges: Peak TR Velocity:     2.29 m/s Est. RA Pressure:     3 mmHg RV Syst Pressure:     24 mmHg  (< 30mmHg)  AORTA: Asc Ao Diam 3.52 cm  [id number] Bradly Magdaleno MD Electronically signed on 2025 at 5:31:44 PM  ** Final **     ECG 12 lead  Result Date: 2025  Atrial flutter/fibrillation with RVR Repolarization abnormality, prob rate related Prolonged QT interval Confirmed by James Stewart ([id number]) on 2025 1:02:34 PM         Lab Results  Results for orders placed or performed during the hospital encounter of 07/10/25 (from the past 24 hours)   CBC   Result Value Ref Range    WBC 8.0 4.4 - 11.3 x10*3/uL    nRBC 0.0 0.0 - 0.0 /100 WBCs    RBC 4.12 (L) 4.50 - 5.90 x10*6/uL    Hemoglobin 12.9 (L) 13.5 - 17.5 g/dL    Hematocrit 38.0 (L) 41.0 - 52.0 %    MCV 92 80 - 100 fL    MCH 31.3 26.0 - 34.0 pg    MCHC 33.9 32.0 - 36.0 g/dL    RDW 13.6 11.5 - 14.5 %    Platelets 207 150 - 450 x10*3/uL   Basic Metabolic Panel   Result Value Ref Range    Glucose 111 (H) 74 - 99 mg/dL    Sodium 137 136 - 145 mmol/L    Potassium 3.5 3.5 - 5.3 mmol/L    Chloride 104 98 - 107 mmol/L    Bicarbonate 24 21 - 32 mmol/L    Anion Gap 13 10 - 20 mmol/L    Urea Nitrogen 13 6 - 23 mg/dL    Creatinine 0.97 0.50 - 1.30 mg/dL    eGFR 78  >60 mL/min/1.73m*2    Calcium 8.7 8.6 - 10.3 mg/dL   Magnesium   Result Value Ref Range    Magnesium 2.20 1.60 - 2.40 mg/dL   Heparin Assay, UFH   Result Value Ref Range    Heparin Unfractionated 0.7 See Comment Below for Therapeutic Ranges IU/mL        Medications  Scheduled medications:  Scheduled Medications[1]  Continuous medications:  Continuous Medications[2]  PRN medications:  PRN Medications[3]     Physical Exam  Constitutional:       General: He is not in acute distress.     Appearance: Normal appearance.      Comments: Sitting upright in bedside chair   HENT:      Head: Normocephalic and atraumatic.      Right Ear: External ear normal.      Left Ear: External ear normal.      Nose: Nose normal.      Mouth/Throat:      Mouth: Mucous membranes are moist.      Pharynx: Oropharynx is clear.   Eyes:      Extraocular Movements: Extraocular movements intact.      Conjunctiva/sclera: Conjunctivae normal.      Pupils: Pupils are equal, round, and reactive to light.   Cardiovascular:      Rate and Rhythm: Tachycardia present. Rhythm irregular.      Pulses: Normal pulses.      Heart sounds: Normal heart sounds.   Pulmonary:      Effort: Pulmonary effort is normal. No respiratory distress.      Breath sounds: Normal breath sounds. No wheezing, rhonchi or rales.   Abdominal:      General: Bowel sounds are normal.      Palpations: Abdomen is soft.      Tenderness: There is no abdominal tenderness (RUQ). There is no right CVA tenderness, left CVA tenderness, guarding or rebound.      Comments: No further abdominal tenderness   Musculoskeletal:         General: No swelling. Normal range of motion.      Cervical back: Normal range of motion and neck supple.   Skin:     General: Skin is warm and dry.      Capillary Refill: Capillary refill takes less than 2 seconds.      Findings: No rash.   Neurological:      General: No focal deficit present.      Mental Status: He is alert and oriented to person, place, and time. Mental  status is at baseline.   Psychiatric:         Mood and Affect: Mood normal.         Behavior: Behavior normal.              Assessment/Plan   This patient currently has cardiac telemetry ordered; if you would like to modify or discontinue the telemetry order, click here to go to the orders activity to modify/discontinue the order.    Acute cholecystitis  CT scan suggestive of acute cholecystitis, US undetermined, HIDA confirms dx  Surgery consulted  IV zosyn  Diet per general surgery service  As needed morphine and Zofran   Monitor fever/WBC curve  Total bili slightly elevated, direct bili WNL  Surgery ordered IR guided cholecystostomy tube however patient declined  General surgery planning antibiotic therapy only for now with delayed cholecystectomy given his advanced age and underlying cardiac issues also given the fact that he did respond well in regards to pain and normalization of his white blood cell count with antibiotics alone  Patient advised that he does have an increased risk of failure with oral antibiotics alone, he verbalized understanding  He has an office appointment arranged with Dr. Mata for 7/22/2025 to discuss scheduling gallbladder surgery    Paroxysmal atrial fibrillation/flutter-newly diagnosed  ECG showed atrial flutter with a heart rate of 137 bpm and ST segment depression in V2 through V6.   Heparin drip  IV beta-blocker x1  Increase PO metoprolol dose  Tele monitoring   Cardiology on board, offered CARRI but patient declined at this time- reevaluate possibility of rhythm control at a later time  Back to normal sinus rhythm 7/11/25  Stress in the AM    Hypokalemia/Hypomagnesemia   Replete PRN    Coronary artery disease status post CABG   Mildly elevated troponin  Asa, statin, BB  Troponin leak thought secondary to demand ischemia patient does not appear to have active chest pain   ECG nonischemic  Patient did not want to get an Echo  Stress test ordered-this will be done on Monday,  7/14/2025    HTN  Continue home medications  Monitor BP  Adjust as needed     DLD  Statin as above  Monitor LFTs    Code Status: Full Code     Piero Austin MD PhD       [1] amLODIPine, 10 mg, oral, Daily  aspirin, 81 mg, oral, Daily  atorvastatin, 80 mg, oral, Nightly  chlorthalidone, 25 mg, oral, Daily  cholecalciferol, 125 mcg, oral, Daily  ezetimibe, 10 mg, oral, Daily  metoprolol succinate XL, 25 mg, oral, BID  pantoprazole, 40 mg, oral, Daily before breakfast   Or  pantoprazole, 40 mg, intravenous, Daily before breakfast  perflutren lipid microspheres, 0.5-10 mL of dilution, intravenous, Once in imaging  perflutren protein A microsphere, 0.5 mL, intravenous, Once in imaging  piperacillin-tazobactam, 3.375 g, intravenous, q6h  polyethylene glycol, 17 g, oral, Daily  regadenoson, 0.4 mg, intravenous, Once in imaging  sulfur hexafluoride microsphr, 2 mL, intravenous, Once in imaging     [2] heparin, 0-4,500 Units/hr, Last Rate: 1,400 Units/hr (07/13/25 1317)     [3] PRN medications: aminophylline, bisacodyl, bisacodyl, guaiFENesin, heparin, melatonin, morphine, ondansetron **OR** ondansetron

## 2025-07-13 NOTE — CARE PLAN
The patient's goals for the shift include adequate pain control    The clinical goals for the shift include patient will remain hemodynamically stable throughout shift    Over the shift, the patient did make progress toward the following goals. Barriers to progression include condition changes . Recommendations to address these barriers include treatments and medications as ordered.

## 2025-07-13 NOTE — CARE PLAN
The patient's goals for the shift include adequate pain control    The clinical goals for the shift include patient will remain hemodynamically stable throughout shift      Problem: Pain - Adult  Goal: Verbalizes/displays adequate comfort level or baseline comfort level  Outcome: Progressing     Problem: Safety - Adult  Goal: Free from fall injury  Outcome: Progressing     Problem: Discharge Planning  Goal: Discharge to home or other facility with appropriate resources  Outcome: Progressing     Problem: Chronic Conditions and Co-morbidities  Goal: Patient's chronic conditions and co-morbidity symptoms are monitored and maintained or improved  Outcome: Progressing     Problem: Nutrition  Goal: Nutrient intake appropriate for maintaining nutritional needs  Outcome: Progressing     Problem: Pain  Goal: Takes deep breaths with improved pain control throughout the shift  Outcome: Progressing  Goal: Turns in bed with improved pain control throughout the shift  Outcome: Progressing  Goal: Walks with improved pain control throughout the shift  Outcome: Progressing  Goal: Performs ADL's with improved pain control throughout shift  Outcome: Progressing  Goal: Participates in PT with improved pain control throughout the shift  Outcome: Progressing  Goal: Free from opioid side effects throughout the shift  Outcome: Progressing  Goal: Free from acute confusion related to pain meds throughout the shift  Outcome: Progressing     Problem: Diabetes  Goal: Achieve decreasing blood glucose levels by end of shift  Outcome: Progressing  Goal: Increase stability of blood glucose readings by end of shift  Outcome: Progressing  Goal: Decrease in ketones present in urine by end of shift  Outcome: Progressing  Goal: Maintain electrolyte levels within acceptable range throughout shift  Outcome: Progressing  Goal: Maintain glucose levels >70mg/dl to <250mg/dl throughout shift  Outcome: Progressing  Goal: No changes in neurological exam by end  of shift  Outcome: Progressing  Goal: Learn about and adhere to nutrition recommendations by end of shift  Outcome: Progressing  Goal: Vital signs within normal range for age by end of shift  Outcome: Progressing  Goal: Increase self care and/or family involovement by end of shift  Outcome: Progressing  Goal: Receive DSME education by end of shift  Outcome: Progressing

## 2025-07-13 NOTE — NURSING NOTE
End of Shift Report  7/13/25     Admission  Neftali Schmitt was admitted on 7/10/2025 for the following diagnoses:   Shortness of breath [R06.02]  Cholecystitis [K81.9]    Significant Events  There were no significant events    Interventions      Response to Interventions       Recent Vital Signs  Patient Vitals for the past 12 hrs:   BP Temp Temp src Pulse Resp SpO2 Weight   07/12/25 1949 125/63 36.1 °C (97 °F) Temporal 58 17 96 % --   07/12/25 2334 128/70 36.2 °C (97.2 °F) Temporal 57 18 96 % --   07/13/25 0406 121/66 36.2 °C (97.2 °F) Temporal 66 17 94 % 91.5 kg (201 lb 11.5 oz)      0-10 (Numeric) Pain Score: 0 - No pain     Latest labs  Lab Results   Component Value Date    WBC 8.0 07/13/2025    HGB 12.9 (L) 07/13/2025    HCT 38.0 (L) 07/13/2025     07/13/2025    CHOL 139 03/18/2025    TRIG 137 03/18/2025    HDL 49 03/18/2025    ALT 24 07/11/2025    AST 21 07/11/2025     07/13/2025    K 3.5 07/13/2025     07/13/2025    CREATININE 0.97 07/13/2025    BUN 13 07/13/2025    CO2 24 07/13/2025    TSH 3.24 03/18/2025    PSA 0.70 03/18/2025    INR 1.1 07/11/2025    HGBA1C 6.2 (H) 03/18/2025       Other Results      Scheduled Medications:  Scheduled Medications[1]   Continuous Medications[2]          [1] amLODIPine, 10 mg, oral, Daily  aspirin, 81 mg, oral, Daily  atorvastatin, 80 mg, oral, Nightly  chlorthalidone, 25 mg, oral, Daily  cholecalciferol, 125 mcg, oral, Daily  ezetimibe, 10 mg, oral, Daily  metoprolol succinate XL, 25 mg, oral, BID  pantoprazole, 40 mg, oral, Daily before breakfast   Or  pantoprazole, 40 mg, intravenous, Daily before breakfast  perflutren lipid microspheres, 0.5-10 mL of dilution, intravenous, Once in imaging  perflutren protein A microsphere, 0.5 mL, intravenous, Once in imaging  piperacillin-tazobactam, 3.375 g, intravenous, q6h  polyethylene glycol, 17 g, oral, Daily  regadenoson, 0.4 mg, intravenous, Once in imaging  sulfur hexafluoride microsphr, 2 mL, intravenous,  Once in imaging  [2] heparin, 0-4,500 Units/hr, Last Rate: 1,400 Units/hr (07/13/25 0668)

## 2025-07-13 NOTE — PROGRESS NOTES
PROGRESS NOTE    HPI:  Neftali Schmitt is a 80 y.o. male who presented to the emergency department with right upper quadrant abdominal pain.  Patient states that around 2 AM he woke up with right-sided pain.  He drink some ginger ale with some improvement of his symptoms but as it was not completely resolved he decided to come to the emergency department for evaluation.  Patient was subsequently found to have evidence of acute cholecystitis.  Patient was seen by general surgery who recommended cholecystostomy tube and antibiotic therapy followed by outpatient surgery in 4 to 6 weeks.  Patient declined undergoing cholecystostomy tube.  Cardiology was consulted for abnormal troponin.  When I went to evaluate the patient he was noted to be tachycardic with telemetry showing what appeared to be atrial flutter with heart rate of 140 bpm.  ECG done this morning showed atrial flutter with a heart rate of 137 bpm.  Started the patient on a heparin infusion and gave IV metoprolol.  Telemetry now showing atrial fibrillation with heart rate in the 120s.  BMP showing a serum sodium of 137, serum potassium 3.6, serum creatinine is 0.94.  INR was 1.1.  Troponin was 58-49.  CBC showed hemoglobin of 13.3. Chest x-ray done 7/10/2025 showed opacity in the right posterior costophrenic angle, possible small enlarging pleural effusion or focal consolidation.  CT scan of the abdomen/pelvis done 7/10/2025 showed distended gallbladder with questionable underlying gallstones, gallbladder wall thickening and mild pericholecystic haziness suggestive of acute cholecystitis, mild biliary dilatation with the common bile duct measuring up to 10 mm.  Ultrasound of the gallbladder done 7/10/2025 showed gallbladder sludge with borderline gallbladder wall thickening, no cholelithiasis, increased echogenicity of the right renal cortex, no hydronephrosis, renal cyst, diffuse hepatic steatosis.  Nuclear medicine scan done 7/10/2025 was abnormal with  findings consistent with acute cholecystitis.  During my exam patient was resting in a bedside chair.     Subjective Data:  Patient telemetry shows that he has converted back to sinus rhythm.  Patient has no cardiac symptoms such as chest pain palpitations or shortness of breath.  If he pushes on his belly hard enough still some mild right upper quadrant pain but while I am talking to him he is eating pancakes and sausage with no GI complaints.  He remains on a heparin infusion and plan is for stress testing on Monday.  7-13-25: Patient sitting up in chair.  Remains on heparin infusion.  No cardiac complaints or concerns.  Telemetry sinus bradycardia 58 plan for stress test in the morning.    Overnight Events:    None     Objective Data:  Last Recorded Vitals:  Vitals:    07/12/25 1949 07/12/25 2334 07/13/25 0406 07/13/25 0707   BP: 125/63 128/70 121/66 137/65   BP Location: Right arm Left arm Left arm Left arm   Patient Position: Sitting Sitting Lying Lying   Pulse: 58 57 66 55   Resp: 17 18 17 18   Temp: 36.1 °C (97 °F) 36.2 °C (97.2 °F) 36.2 °C (97.2 °F) 35.8 °C (96.4 °F)   TempSrc: Temporal Temporal Temporal Temporal   SpO2: 96% 96% 94% 96%   Weight:   91.5 kg (201 lb 11.5 oz)    Height:           Last Labs:  CBC - 7/13/2025:  4:08 AM  8.0 12.9 207    38.0      CMP - 7/13/2025:  4:08 AM  8.7 6.5 21 --- 1.9   _ 3.9 24 71      PTT - 7/11/2025: 10:25 AM  1.1   12.6 35     Last I/O:  I/O last 3 completed shifts:  In: 594.5 (6.5 mL/kg) [I.V.:594.5 (6.5 mL/kg)]  Out: - (0 mL/kg)   Weight: 91.5 kg     Past Cardiology Tests (Last 3 Years):  Echo: CONCLUSIONS:   1. Poorly visualized anatomical structures due to suboptimal image quality.   2. The left ventricular systolic function is mildly decreased with a visually estimated ejection fraction of 40-45%.   3. Left ventricular diastolic filling was indeterminate.   4. Mild to moderate mitral valve regurgitation.   5. The Doppler estimated RVSP is within normal limits at 24  mmHg.  Noted above change in EF dating back to echo from 2021 when his echo showed normal LV systolic function    Inpatient Medications:  Scheduled Medications[1]    Review of Systems   Constitutional: Negative for fever and malaise/fatigue.   Cardiovascular:  Negative for chest pain, orthopnea and palpitations.   Respiratory:  Negative for shortness of breath and wheezing.    Skin:  Negative for itching and rash.   Gastrointestinal:  Negative for abdominal pain, diarrhea, nausea and vomiting.   Genitourinary:  Negative for dysuria.   Neurological:  Negative for weakness.        Physical Exam  Constitutional:       General: He is not in acute distress.  HENT:      Mouth/Throat:      Mouth: Mucous membranes are moist.   Neck:      Comments: Flat neck veins  Cardiovascular:      Rate and Rhythm: Normal rate and regular rhythm.      Heart sounds: Normal heart sounds. No murmur heard.  Pulmonary:      Effort: Pulmonary effort is normal.      Breath sounds: Normal breath sounds.   Abdominal:      General: Abdomen is flat. Bowel sounds are normal.      Palpations: Abdomen is soft.      Comments: No abdominal pain   Musculoskeletal:         General: No swelling.   Skin:     General: Skin is warm and dry.   Neurological:      Mental Status: He is alert and oriented to person, place, and time.   Psychiatric:         Mood and Affect: Mood normal.        ASSESSMENT/PLAN  1.  Paroxysmal atrial fibrillation/flutter  Patient is newly diagnosed with paroxysmal atrial fibrillation/flutter.  Patient started on a heparin infusion for anticoagulation.  Patient given IV beta-blocker to help lower heart rate.  I will increase beta-blocker therapy.  I discussed with him possible CARRI/cardioversion although patient stated he did not want to pursue at this time as he believes his elevated heart rate was related to being upset regarding the recommendation of a cholecystostomy tube.  Will continue to adjust medications for rate control for  now.  Will rediscuss rhythm control approach with patient when less upset.  7-12-25: Patient back in sinus rhythm.  No cardiac complaints otherwise.  Continue heparin infusion until we get stress results on Monday and if there is no evidence of ischemia can transition to Eliquis.  7-13-25: Remains in sinus rhythm.  Continue heparin.  Stress testing in the a.m.  If negative stress test needs transition to Eliquis.       2.  Coronary artery disease/elevated troponin  The patient has a history of coronary artery disease status post bypass done in 2007.  Patient now found to have mildly abnormal troponin of 58-49.  Patient denies any anginal chest discomfort.  ECG done this morning at 848 am showed atrial flutter with a heart rate of 137 bpm and ST segment depression in V2 through V6.  Will check echocardiogram and stress study for additional evaluation.  7-12-25:  No chest pain or angina however his echocardiogram shows EF of 40 to 45% which is changed from his last echo in 2021 at which time EF was normal.  No specific wall motion abnormalities noted on the echo  7-13-25: No cardiac symptoms.  Again stress test in the morning.     3.  Carotid artery disease/vertebral artery disease  Stable, continue with risk factor modification     4.  Hypertension  The patient has a history of hypertension which appears under moderate control.  Continue to monitor and adjust antihypertensive medical therapy while here.  7-12-25: Blood pressure is well-controlled on current meds.  7-13-25: Blood pressures remain well-controlled       5.  Dyslipidemia  Continue statin therapy     6.  Acute cholecystitis  Patient was seen by general surgery who recommended cholecystostomy tube and antibiotic therapy followed by outpatient surgery in 4 to 6 weeks.    Patient declined undergoing cholecystostomy tube.    Management per general surgery/hospitalist service  7-12-25: Patient eating normal diet with no significant GI symptoms although he  still has mild right upper quadrant tenderness.  Patient is scheduled as an outpatient for cholecystectomy on 22 July.  Again we will get the stress test on Monday for further restratification.  Recommendations--patient back in sinus rhythm.  Continue heparin infusion until we know the results of the stress test on Monday.  He does have change in his overall EF to 40 to 45% compared to echo in 2021.  No overt CHF.  Blood pressures are controlled.  Patient with some hypokalemia and replacement has been ordered via IV and orally .  Repeat labs in AM.    Recommendations--continue heparin infusion and will get the stress test in the morning.  If no ischemia need transition to oral anticoagulant he could be discharged.            Eze Langston PA-C  7/13/2025  9:17 AM           [1]   Scheduled medications   Medication Dose Route Frequency    amLODIPine  10 mg oral Daily    aspirin  81 mg oral Daily    atorvastatin  80 mg oral Nightly    chlorthalidone  25 mg oral Daily    cholecalciferol  125 mcg oral Daily    ezetimibe  10 mg oral Daily    metoprolol succinate XL  25 mg oral BID    pantoprazole  40 mg oral Daily before breakfast    Or    pantoprazole  40 mg intravenous Daily before breakfast    perflutren lipid microspheres  0.5-10 mL of dilution intravenous Once in imaging    perflutren protein A microsphere  0.5 mL intravenous Once in imaging    piperacillin-tazobactam  3.375 g intravenous q6h    polyethylene glycol  17 g oral Daily    regadenoson  0.4 mg intravenous Once in imaging    sulfur hexafluoride microsphr  2 mL intravenous Once in imaging

## 2025-07-14 ENCOUNTER — APPOINTMENT (OUTPATIENT)
Dept: RADIOLOGY | Facility: HOSPITAL | Age: 80
End: 2025-07-14
Payer: MEDICARE

## 2025-07-14 ENCOUNTER — APPOINTMENT (OUTPATIENT)
Dept: CARDIOLOGY | Facility: HOSPITAL | Age: 80
End: 2025-07-14
Payer: MEDICARE

## 2025-07-14 DIAGNOSIS — I10 BENIGN ESSENTIAL HYPERTENSION: ICD-10-CM

## 2025-07-14 DIAGNOSIS — E78.2 MIXED HYPERLIPIDEMIA: ICD-10-CM

## 2025-07-14 LAB
ANION GAP SERPL CALC-SCNC: 15 MMOL/L (ref 10–20)
BUN SERPL-MCNC: 12 MG/DL (ref 6–23)
CALCIUM SERPL-MCNC: 9.1 MG/DL (ref 8.6–10.3)
CHLORIDE SERPL-SCNC: 102 MMOL/L (ref 98–107)
CO2 SERPL-SCNC: 24 MMOL/L (ref 21–32)
CREAT SERPL-MCNC: 0.97 MG/DL (ref 0.5–1.3)
EGFRCR SERPLBLD CKD-EPI 2021: 78 ML/MIN/1.73M*2
ERYTHROCYTE [DISTWIDTH] IN BLOOD BY AUTOMATED COUNT: 13.6 % (ref 11.5–14.5)
GLUCOSE SERPL-MCNC: 101 MG/DL (ref 74–99)
HCT VFR BLD AUTO: 40.7 % (ref 41–52)
HGB BLD-MCNC: 13.5 G/DL (ref 13.5–17.5)
MAGNESIUM SERPL-MCNC: 2.04 MG/DL (ref 1.6–2.4)
MCH RBC QN AUTO: 31.7 PG (ref 26–34)
MCHC RBC AUTO-ENTMCNC: 33.2 G/DL (ref 32–36)
MCV RBC AUTO: 96 FL (ref 80–100)
NRBC BLD-RTO: 0 /100 WBCS (ref 0–0)
PLATELET # BLD AUTO: 235 X10*3/UL (ref 150–450)
POTASSIUM SERPL-SCNC: 3.7 MMOL/L (ref 3.5–5.3)
RBC # BLD AUTO: 4.26 X10*6/UL (ref 4.5–5.9)
SODIUM SERPL-SCNC: 137 MMOL/L (ref 136–145)
UFH PPP CHRO-ACNC: 0.5 IU/ML (ref ?–1.1)
UFH PPP CHRO-ACNC: <0.1 IU/ML (ref ?–1.1)
WBC # BLD AUTO: 6.5 X10*3/UL (ref 4.4–11.3)

## 2025-07-14 PROCEDURE — 78452 HT MUSCLE IMAGE SPECT MULT: CPT

## 2025-07-14 PROCEDURE — 83735 ASSAY OF MAGNESIUM: CPT | Performed by: PHYSICIAN ASSISTANT

## 2025-07-14 PROCEDURE — 93016 CV STRESS TEST SUPVJ ONLY: CPT | Performed by: INTERNAL MEDICINE

## 2025-07-14 PROCEDURE — 36415 COLL VENOUS BLD VENIPUNCTURE: CPT | Performed by: INTERNAL MEDICINE

## 2025-07-14 PROCEDURE — 3430000001 HC RX 343 DIAGNOSTIC RADIOPHARMACEUTICALS: Performed by: INTERNAL MEDICINE

## 2025-07-14 PROCEDURE — 85027 COMPLETE CBC AUTOMATED: CPT | Performed by: INTERNAL MEDICINE

## 2025-07-14 PROCEDURE — 2500000002 HC RX 250 W HCPCS SELF ADMINISTERED DRUGS (ALT 637 FOR MEDICARE OP, ALT 636 FOR OP/ED): Performed by: NURSE PRACTITIONER

## 2025-07-14 PROCEDURE — 93017 CV STRESS TEST TRACING ONLY: CPT

## 2025-07-14 PROCEDURE — A9502 TC99M TETROFOSMIN: HCPCS | Performed by: INTERNAL MEDICINE

## 2025-07-14 PROCEDURE — 99232 SBSQ HOSP IP/OBS MODERATE 35: CPT | Performed by: SURGERY

## 2025-07-14 PROCEDURE — 2500000004 HC RX 250 GENERAL PHARMACY W/ HCPCS (ALT 636 FOR OP/ED): Performed by: NURSE PRACTITIONER

## 2025-07-14 PROCEDURE — 93018 CV STRESS TEST I&R ONLY: CPT | Performed by: INTERNAL MEDICINE

## 2025-07-14 PROCEDURE — 2500000004 HC RX 250 GENERAL PHARMACY W/ HCPCS (ALT 636 FOR OP/ED): Performed by: INTERNAL MEDICINE

## 2025-07-14 PROCEDURE — 2060000001 HC INTERMEDIATE ICU ROOM DAILY

## 2025-07-14 PROCEDURE — 80048 BASIC METABOLIC PNL TOTAL CA: CPT | Performed by: PHYSICIAN ASSISTANT

## 2025-07-14 PROCEDURE — 2500000001 HC RX 250 WO HCPCS SELF ADMINISTERED DRUGS (ALT 637 FOR MEDICARE OP): Performed by: INTERNAL MEDICINE

## 2025-07-14 PROCEDURE — 2500000001 HC RX 250 WO HCPCS SELF ADMINISTERED DRUGS (ALT 637 FOR MEDICARE OP): Performed by: NURSE PRACTITIONER

## 2025-07-14 PROCEDURE — 99232 SBSQ HOSP IP/OBS MODERATE 35: CPT | Performed by: PHYSICIAN ASSISTANT

## 2025-07-14 PROCEDURE — 85520 HEPARIN ASSAY: CPT | Performed by: INTERNAL MEDICINE

## 2025-07-14 PROCEDURE — 85027 COMPLETE CBC AUTOMATED: CPT | Performed by: PHYSICIAN ASSISTANT

## 2025-07-14 PROCEDURE — 78452 HT MUSCLE IMAGE SPECT MULT: CPT | Performed by: INTERNAL MEDICINE

## 2025-07-14 RX ORDER — CHLORTHALIDONE 25 MG/1
25 TABLET ORAL DAILY
Qty: 90 TABLET | Refills: 1 | OUTPATIENT
Start: 2025-07-14

## 2025-07-14 RX ADMIN — PANTOPRAZOLE SODIUM 40 MG: 40 TABLET, DELAYED RELEASE ORAL at 06:09

## 2025-07-14 RX ADMIN — CHOLECALCIFEROL TAB 125 MCG (5000 UNIT) 125 MCG: 125 TAB at 10:19

## 2025-07-14 RX ADMIN — ATORVASTATIN CALCIUM 80 MG: 40 TABLET, FILM COATED ORAL at 21:15

## 2025-07-14 RX ADMIN — HEPARIN SODIUM 14 UNITS/HR: 10000 INJECTION, SOLUTION INTRAVENOUS at 17:15

## 2025-07-14 RX ADMIN — AMLODIPINE BESYLATE 10 MG: 10 TABLET ORAL at 10:18

## 2025-07-14 RX ADMIN — PIPERACILLIN SODIUM AND TAZOBACTAM SODIUM 3.38 G: 3; .375 INJECTION, SOLUTION INTRAVENOUS at 15:38

## 2025-07-14 RX ADMIN — ASPIRIN 81 MG: 81 TABLET, DELAYED RELEASE ORAL at 10:18

## 2025-07-14 RX ADMIN — CHLORTHALIDONE 25 MG: 25 TABLET ORAL at 10:19

## 2025-07-14 RX ADMIN — TETROFOSMIN 12 MILLICURIE: 0.23 INJECTION, POWDER, LYOPHILIZED, FOR SOLUTION INTRAVENOUS at 07:55

## 2025-07-14 RX ADMIN — METOPROLOL SUCCINATE 25 MG: 25 TABLET, EXTENDED RELEASE ORAL at 10:19

## 2025-07-14 RX ADMIN — METOPROLOL SUCCINATE 25 MG: 25 TABLET, EXTENDED RELEASE ORAL at 21:15

## 2025-07-14 RX ADMIN — EZETIMIBE 10 MG: 10 TABLET ORAL at 10:18

## 2025-07-14 RX ADMIN — PIPERACILLIN SODIUM AND TAZOBACTAM SODIUM 3.38 G: 3; .375 INJECTION, SOLUTION INTRAVENOUS at 10:19

## 2025-07-14 RX ADMIN — REGADENOSON 0.4 MG: 0.08 INJECTION, SOLUTION INTRAVENOUS at 09:05

## 2025-07-14 RX ADMIN — PIPERACILLIN SODIUM AND TAZOBACTAM SODIUM 3.38 G: 3; .375 INJECTION, SOLUTION INTRAVENOUS at 21:14

## 2025-07-14 RX ADMIN — TETROFOSMIN 27 MILLICURIE: 0.23 INJECTION, POWDER, LYOPHILIZED, FOR SOLUTION INTRAVENOUS at 08:55

## 2025-07-14 RX ADMIN — PIPERACILLIN SODIUM AND TAZOBACTAM SODIUM 3.38 G: 3; .375 INJECTION, SOLUTION INTRAVENOUS at 02:57

## 2025-07-14 ASSESSMENT — ENCOUNTER SYMPTOMS
HEMATURIA: 0
FREQUENCY: 0
AGITATION: 0
FEVER: 0
PALPITATIONS: 0
WHEEZING: 0
EYE PAIN: 0
BRUISES/BLEEDS EASILY: 0
SHORTNESS OF BREATH: 0
COUGH: 0
FATIGUE: 0
CHILLS: 0
ABDOMINAL PAIN: 0
ORTHOPNEA: 0
SPEECH DIFFICULTY: 0
ARTHRALGIAS: 1
HEADACHES: 0
CONFUSION: 0
MYALGIAS: 1
DIARRHEA: 0
DYSURIA: 0
POLYPHAGIA: 0
VOMITING: 0
CONSTIPATION: 0
FLANK PAIN: 0
EYE REDNESS: 0
WOUND: 0
NAUSEA: 0
WEAKNESS: 0

## 2025-07-14 ASSESSMENT — PAIN SCALES - GENERAL
PAINLEVEL_OUTOF10: 0 - NO PAIN

## 2025-07-14 ASSESSMENT — PAIN - FUNCTIONAL ASSESSMENT
PAIN_FUNCTIONAL_ASSESSMENT: 0-10

## 2025-07-14 NOTE — PROGRESS NOTES
PROGRESS NOTE    HPI:  Neftali Schmitt is a 80 y.o. male who presented to the emergency department with right upper quadrant abdominal pain.  Patient states that around 2 AM he woke up with right-sided pain.  He drink some ginger ale with some improvement of his symptoms but as it was not completely resolved he decided to come to the emergency department for evaluation.  Patient was subsequently found to have evidence of acute cholecystitis.  Patient was seen by general surgery who recommended cholecystostomy tube and antibiotic therapy followed by outpatient surgery in 4 to 6 weeks.  Patient declined undergoing cholecystostomy tube.  Cardiology was consulted for abnormal troponin.  When I went to evaluate the patient he was noted to be tachycardic with telemetry showing what appeared to be atrial flutter with heart rate of 140 bpm.  ECG done this morning showed atrial flutter with a heart rate of 137 bpm.  Started the patient on a heparin infusion and gave IV metoprolol.  Telemetry now showing atrial fibrillation with heart rate in the 120s.  BMP showing a serum sodium of 137, serum potassium 3.6, serum creatinine is 0.94.  INR was 1.1.  Troponin was 58-49.  CBC showed hemoglobin of 13.3. Chest x-ray done 7/10/2025 showed opacity in the right posterior costophrenic angle, possible small enlarging pleural effusion or focal consolidation.  CT scan of the abdomen/pelvis done 7/10/2025 showed distended gallbladder with questionable underlying gallstones, gallbladder wall thickening and mild pericholecystic haziness suggestive of acute cholecystitis, mild biliary dilatation with the common bile duct measuring up to 10 mm.  Ultrasound of the gallbladder done 7/10/2025 showed gallbladder sludge with borderline gallbladder wall thickening, no cholelithiasis, increased echogenicity of the right renal cortex, no hydronephrosis, renal cyst, diffuse hepatic steatosis.  Nuclear medicine scan done 7/10/2025 was abnormal with  findings consistent with acute cholecystitis.  During my exam patient was resting in a bedside chair.     Subjective Data:  Patient telemetry shows that he has converted back to sinus rhythm.  Patient has no cardiac symptoms such as chest pain palpitations or shortness of breath.  If he pushes on his belly hard enough still some mild right upper quadrant pain but while I am talking to him he is eating pancakes and sausage with no GI complaints.  He remains on a heparin infusion and plan is for stress testing on Monday.  7-13-25: Patient sitting up in chair.  Remains on heparin infusion.  No cardiac complaints or concerns.  Telemetry sinus bradycardia 58 plan for stress test in the morning.  7-14-25:  Patient seen in stress lab.  No cardiac complaints.  No GI complaints.  Reviewed briefly with surgery.  If stress is OK he can be discharged however if abnormal will need to discuss C.      Overnight Events:    None     Objective Data:  Last Recorded Vitals:  Vitals:    07/13/25 2340 07/14/25 0359 07/14/25 0808 07/14/25 0811   BP: 134/62 132/70 170/78 166/72   BP Location: Left arm Left arm Right arm Left arm   Patient Position: Lying Lying Sitting Sitting   Pulse: 59 63 59    Resp: 18 18 18    Temp: 36.6 °C (97.8 °F) 36.4 °C (97.5 °F) 35.9 °C (96.7 °F)    TempSrc: Temporal Temporal Temporal    SpO2: 94% 96% 97%    Weight:  90.3 kg (199 lb 1.2 oz)     Height:           Last Labs:  CBC - 7/14/2025:  4:44 AM  6.5 13.5 235    40.7      CMP - 7/14/2025:  4:44 AM  9.1 6.5 21 --- 1.9   _ 3.9 24 71      PTT - 7/11/2025: 10:25 AM  1.1   12.6 35     Last I/O:  I/O last 3 completed shifts:  In: 834.5 (9.2 mL/kg) [P.O.:240; I.V.:594.5 (6.6 mL/kg)]  Out: - (0 mL/kg)   Weight: 90.3 kg     Past Cardiology Tests (Last 3 Years):  Echo: CONCLUSIONS:   1. Poorly visualized anatomical structures due to suboptimal image quality.   2. The left ventricular systolic function is mildly decreased with a visually estimated ejection fraction of  40-45%.   3. Left ventricular diastolic filling was indeterminate.   4. Mild to moderate mitral valve regurgitation.   5. The Doppler estimated RVSP is within normal limits at 24 mmHg.  Noted above change in EF dating back to echo from 2021 when his echo showed normal LV systolic function    Inpatient Medications:  Scheduled Medications[1]    Review of Systems   Constitutional: Negative for fever and malaise/fatigue.   Cardiovascular:  Negative for chest pain, orthopnea and palpitations.   Respiratory:  Negative for shortness of breath and wheezing.    Skin:  Negative for itching and rash.   Gastrointestinal:  Negative for abdominal pain, diarrhea, nausea and vomiting.   Genitourinary:  Negative for dysuria.   Neurological:  Negative for weakness.        Physical Exam  Constitutional:       General: He is not in acute distress.  HENT:      Mouth/Throat:      Mouth: Mucous membranes are moist.   Neck:      Comments: Flat neck veins  Cardiovascular:      Rate and Rhythm: Normal rate and regular rhythm.      Heart sounds: Normal heart sounds. No murmur heard.  Pulmonary:      Effort: Pulmonary effort is normal.      Breath sounds: Normal breath sounds.   Abdominal:      General: Abdomen is flat. Bowel sounds are normal.      Palpations: Abdomen is soft.      Comments: No abdominal pain   Musculoskeletal:         General: No swelling.   Skin:     General: Skin is warm and dry.   Neurological:      Mental Status: He is alert and oriented to person, place, and time.   Psychiatric:         Mood and Affect: Mood normal.        ASSESSMENT/PLAN  1.  Paroxysmal atrial fibrillation/flutter  Patient is newly diagnosed with paroxysmal atrial fibrillation/flutter.  Patient started on a heparin infusion for anticoagulation.  Patient given IV beta-blocker to help lower heart rate.  I will increase beta-blocker therapy.  I discussed with him possible CARRI/cardioversion although patient stated he did not want to pursue at this time as  he believes his elevated heart rate was related to being upset regarding the recommendation of a cholecystostomy tube.  Will continue to adjust medications for rate control for now.  Will rediscuss rhythm control approach with patient when less upset.  7-12-25: Patient back in sinus rhythm.  No cardiac complaints otherwise.  Continue heparin infusion until we get stress results on Monday and if there is no evidence of ischemia can transition to Eliquis.  7-13-25: Remains in sinus rhythm.  Continue heparin.  Stress testing in the a.m.  If negative stress test needs transition to Eliquis.    7-14-25:  No further AF.  Continue meds.  Minor nosebleed overnight on heparin.  If stress is negative will need transition to eliquis.  If abnormal keep on heparin.     2.  Coronary artery disease/elevated troponin  The patient has a history of coronary artery disease status post bypass done in 2007.  Patient now found to have mildly abnormal troponin of 58-49.  Patient denies any anginal chest discomfort.  ECG done this morning at 848 am showed atrial flutter with a heart rate of 137 bpm and ST segment depression in V2 through V6.  Will check echocardiogram and stress study for additional evaluation.  7-12-25:  No chest pain or angina however his echocardiogram shows EF of 40 to 45% which is changed from his last echo in 2021 at which time EF was normal.  No specific wall motion abnormalities noted on the echo  7-13-25: No cardiac symptoms.  Again stress test in the morning.  7-14-25:  Further plan pending outcome of stress.     3.  Carotid artery disease/vertebral artery disease  Stable, continue with risk factor modification     4.  Hypertension  The patient has a history of hypertension which appears under moderate control.  Continue to monitor and adjust antihypertensive medical therapy while here.  7-12-25: Blood pressure is well-controlled on current meds.  7-13-25: Blood pressures remain well-controlled    7-14-25: BP's  remain well controlled.     5.  Dyslipidemia  Continue statin therapy     6.  Acute cholecystitis  Patient was seen by general surgery who recommended cholecystostomy tube and antibiotic therapy followed by outpatient surgery in 4 to 6 weeks.    Patient declined undergoing cholecystostomy tube.    Management per general surgery/hospitalist service  7-12-25: Patient eating normal diet with no significant GI symptoms although he still has mild right upper quadrant tenderness.  Patient is scheduled as an outpatient for cholecystectomy on 22 July.  Again we will get the stress test on Monday for further restratification.  Recommendations--patient back in sinus rhythm.  Continue heparin infusion until we know the results of the stress test on Monday.  He does have change in his overall EF to 40 to 45% compared to echo in 2021.  No overt CHF.  Blood pressures are controlled.  Patient with some hypokalemia and replacement has been ordered via IV and orally .  Repeat labs in AM.  7-14-25:  OP surgery 7-22-25 if no further cardiac issues otherwise if he requires intervention this will need delayed. Patient verbalizes understanding.    Recommendations--continue heparin infusion and further plan pending outcome of stress.  If negative he can go home on PO meds and will need eliquis started.             Eze Langston PA-C  7/14/2025  10:44 AM             [1]   Scheduled medications   Medication Dose Route Frequency    amLODIPine  10 mg oral Daily    aspirin  81 mg oral Daily    atorvastatin  80 mg oral Nightly    chlorthalidone  25 mg oral Daily    cholecalciferol  125 mcg oral Daily    ezetimibe  10 mg oral Daily    metoprolol succinate XL  25 mg oral BID    pantoprazole  40 mg oral Daily before breakfast    Or    pantoprazole  40 mg intravenous Daily before breakfast    perflutren lipid microspheres  0.5-10 mL of dilution intravenous Once in imaging    perflutren protein A microsphere  0.5 mL intravenous Once in imaging     piperacillin-tazobactam  3.375 g intravenous q6h    polyethylene glycol  17 g oral Daily    sulfur hexafluoride microsphr  2 mL intravenous Once in imaging

## 2025-07-14 NOTE — PROGRESS NOTES
"    GENERAL SURGERY / TRAUMA PROGRESS NOTE    Patient: Neftali Schmitt  Room: 2030/2030-A    Age: 80 y.o.   Gender: male  Attending: Piero Austin MD PhD    MRN: [id number]  Admission Date: 7/10/2025    PCP: Kiran Beard MD       Neftali Schmitt is a 80 y.o. male on day 4  of admission presenting with Calculus of gallbladder with acute cholecystitis without obstruction.    SUBJECTIVE   Interval History:  Patient with nosebleeds overnight attributed to the heparin drip. Denies any lightheadedness, dizziness, changes to vision or new fatigue. Tolerating diet without nausea and minimal RUQ abdominal pain on palpation. Denies fever, chills, chest pain, SOB, nausea, dysuria, and constipation.     ROS  Review of Systems   Constitutional:  Negative for chills, fatigue and fever.   HENT:  Positive for nosebleeds. Negative for congestion, ear pain and hearing loss.    Eyes:  Negative for pain and redness.   Respiratory:  Negative for cough and shortness of breath.    Cardiovascular:  Negative for chest pain and leg swelling.   Gastrointestinal:  Negative for abdominal pain, constipation, diarrhea, nausea and vomiting.   Endocrine: Negative for polyphagia.   Genitourinary:  Negative for dysuria, flank pain, frequency and hematuria.   Musculoskeletal:  Positive for arthralgias and myalgias.   Skin:  Negative for rash and wound.   Allergic/Immunologic: Negative for immunocompromised state.   Neurological:  Negative for speech difficulty, weakness and headaches.   Hematological:  Does not bruise/bleed easily.   Psychiatric/Behavioral:  Negative for agitation and confusion.           OBJECTIVE   Last Recorded Vitals  Blood pressure 132/70, pulse 63, temperature 36.4 °C (97.5 °F), temperature source Temporal, resp. rate 18, height 1.676 m (5' 6\"), weight 90.3 kg (199 lb 1.2 oz), SpO2 96%.    Intake/Output last 3 Shifts:  I/O last 3 completed shifts:  In: 834.5 (9.2 mL/kg) [P.O.:240; I.V.:594.5 (6.6 mL/kg)]  Out: - (0 " mL/kg)   Weight: 90.3 kg     PHYSICAL EXAM  Physical Exam   General: Well-developed, well-nourished and in no acute distress.  Head: Normocephalic. Atraumatic. No further bleeding noted from nares  Neck/thyroid: Neck is supple.   Eyes: EOMI, anicteric sclera   ENMT: No masses or deformity of external nose. External ears without masses.  Respiratory/Chest:  Normal respiratory effort.  Cardiovascular: Regular rate.  Abdomen: Soft, nontender, nondistended.  Minimal right upper quadrant tenderness without peritoneal signs - no lara sign  Musculoskeletal: Normal extremities with intact ROM; no edema or cyanosis  Neuro: A&O x 4, no focal neuro deficit appreciated.   Psych: Normal mood and affect.    RESULTS   Labs  Results for orders placed or performed during the hospital encounter of 07/10/25 (from the past 24 hours)   Basic Metabolic Panel   Result Value Ref Range    Glucose 101 (H) 74 - 99 mg/dL    Sodium 137 136 - 145 mmol/L    Potassium 3.7 3.5 - 5.3 mmol/L    Chloride 102 98 - 107 mmol/L    Bicarbonate 24 21 - 32 mmol/L    Anion Gap 15 10 - 20 mmol/L    Urea Nitrogen 12 6 - 23 mg/dL    Creatinine 0.97 0.50 - 1.30 mg/dL    eGFR 78 >60 mL/min/1.73m*2    Calcium 9.1 8.6 - 10.3 mg/dL   CBC   Result Value Ref Range    WBC 6.5 4.4 - 11.3 x10*3/uL    nRBC 0.0 0.0 - 0.0 /100 WBCs    RBC 4.26 (L) 4.50 - 5.90 x10*6/uL    Hemoglobin 13.5 13.5 - 17.5 g/dL    Hematocrit 40.7 (L) 41.0 - 52.0 %    MCV 96 80 - 100 fL    MCH 31.7 26.0 - 34.0 pg    MCHC 33.2 32.0 - 36.0 g/dL    RDW 13.6 11.5 - 14.5 %    Platelets 235 150 - 450 x10*3/uL   Magnesium   Result Value Ref Range    Magnesium 2.04 1.60 - 2.40 mg/dL   Heparin Assay, UFH   Result Value Ref Range    Heparin Unfractionated <0.1 See Comment Below for Therapeutic Ranges IU/mL       Radiology Resutls  Imaging  No results found.      Cardiology, Vascular, and Other Imaging  No other imaging results found for the past 2 days         ASSESSMENT / PLAN     Assessment &  Plan  Calculus of gallbladder with acute cholecystitis without obstruction         PLAN    Recommended treating the infection for the acute cholecystitis before proceeding with surgical intervention given his advanced age and his underlying cardiac issues.  He is responding well to the antibiotics and has had a normalization of his WBC.  He is refusing a cholecystostomy tube.  Reviewed that he will have an increased risk of failure with just the oral antibiotics, but he reports that he has had multiple friends with these tubes and he does not want one.  Explained the treatment plan with treating of the infection first and plan for outpatient surgery in the next 4 to 6 weeks.  He may follow-up in my office on 7/22/2025 to discuss timing of the cholecystectomy.   Recommended staying on a low grease low-fat diet.  Plans for stress test per cardiology today, 7/14/25 and pending results stable for discharge home on oral anticoagulation. Appreciate recommendations for when it is safe to proceed with surgery - specifically whether he should wait a full six months prior to scheduling elective cholecystectomy vs proceeding in the next 4-6 weeks. Will discuss directly with cardiology team    Patient discussed with attending, Dr. Adolfo Bah MD  PGY 3 General Surgery       Fela Mata MD, FACS  [facility] General Surgery  [address] [place] [address];   [address]  [place]  [address] [phone]

## 2025-07-14 NOTE — PROGRESS NOTES
Spoke to patient whom confirms he plans to return home with no needs when medically ready. TCC will continue to follow for needs if they arise.

## 2025-07-14 NOTE — CARE PLAN
The patient's goals for the shift include adequate pain control    The clinical goals for the shift include patient will remain hemodynamically stable throughout shift    Physician spoke with pt regarding stress test results.  Heparin drip cont. No complaints throughtout shift.

## 2025-07-14 NOTE — NURSING NOTE
End of Shift Report  7/14/25     Admission  Neftali Schmitt was admitted on 7/10/2025 for the following diagnoses:   Shortness of breath [R06.02]  Cholecystitis [K81.9]    Significant Events  Around 0100 patient began to have nosebleed.     Interventions  Heparin drip stopped with provider confirmation.    Response to Interventions  Nosebleed stopped.     Recent Vital Signs  Patient Vitals for the past 12 hrs:   BP Temp Temp src Pulse Resp SpO2 Weight   07/13/25 2019 155/67 36.3 °C (97.4 °F) Temporal 56 18 98 % --   07/13/25 2340 134/62 36.6 °C (97.8 °F) Temporal 59 18 94 % --   07/14/25 0359 132/70 36.4 °C (97.5 °F) Temporal 63 18 96 % 90.3 kg (199 lb 1.2 oz)      0-10 (Numeric) Pain Score: 0 - No pain     Latest labs  Lab Results   Component Value Date    WBC 6.5 07/14/2025    HGB 13.5 07/14/2025    HCT 40.7 (L) 07/14/2025     07/14/2025    CHOL 139 03/18/2025    TRIG 137 03/18/2025    HDL 49 03/18/2025    ALT 24 07/11/2025    AST 21 07/11/2025     07/14/2025    K 3.7 07/14/2025     07/14/2025    CREATININE 0.97 07/14/2025    BUN 12 07/14/2025    CO2 24 07/14/2025    TSH 3.24 03/18/2025    PSA 0.70 03/18/2025    INR 1.1 07/11/2025    HGBA1C 6.2 (H) 03/18/2025       Other Results      Scheduled Medications:  Scheduled Medications[1]   Continuous Medications[2]          [1] amLODIPine, 10 mg, oral, Daily  aspirin, 81 mg, oral, Daily  atorvastatin, 80 mg, oral, Nightly  chlorthalidone, 25 mg, oral, Daily  cholecalciferol, 125 mcg, oral, Daily  ezetimibe, 10 mg, oral, Daily  metoprolol succinate XL, 25 mg, oral, BID  pantoprazole, 40 mg, oral, Daily before breakfast   Or  pantoprazole, 40 mg, intravenous, Daily before breakfast  perflutren lipid microspheres, 0.5-10 mL of dilution, intravenous, Once in imaging  perflutren protein A microsphere, 0.5 mL, intravenous, Once in imaging  piperacillin-tazobactam, 3.375 g, intravenous, q6h  polyethylene glycol, 17 g, oral, Daily  regadenoson, 0.4 mg,  intravenous, Once in imaging  sulfur hexafluoride microsphr, 2 mL, intravenous, Once in imaging  [2] heparin, 0-4,500 Units/hr, Last Rate: 1,400 Units/hr (07/14/25 0601)

## 2025-07-14 NOTE — DOCUMENTATION CLARIFICATION NOTE
"    PATIENT:               ANEUDY MCCORMICK  ACCT #:                  [id number]  MRN:                       [id number]  :                       1945  ADMIT DATE:       7/10/2025 8:31 AM  DISCH DATE:  RESPONDING PROVIDER #:        [id number]          PROVIDER RESPONSE TEXT:    Acute Myocardial Injury    CDI QUERY TEXT:    Clarification        Instruction:    Based on your assessment of the patient and the clinical information, please provide the requested documentation by clicking on the appropriate radio button and enter any additional information if prompted.    Question: Is there a diagnosis indicative of the patient elevated Troponins and symptoms    When answering this query, please exercise your independent professional judgment. The fact that a question is being asked, does not imply that any particular answer is desired or expected.    The patient's clinical indicators include:  Clinical Information: 25IM: \"presenting with Calculus of gallbladder with acute cholecystitis without obstruction.\"    Clinical Indicators:  HSTI 58, 49  25 Echo: \"CONCLUSIONS:  1. Poorly visualized anatomical structures due to suboptimal image quality.  2. The left ventricular systolic function is mildly decreased with a visually estimated ejection fraction of 40-45%.  3. Left ventricular diastolic filling was indeterminate.  4. Mild to moderate mitral valve regurgitation.  5. The Doppler estimated RVSP is within normal limits at 24 mmHg.\"    25 IM: \"ECG showed atrial flutter with a heart rate of 137 bpm and ST segment depression in V2 through V6. Troponin leak thought secondary to demand ischemia patient does not appear to have active chest pain \"    Treatment: IV Heparin gtt, IV beta-blocker, increase po metoprolol, tele, cards consult, stress test.    Risk Factors: Hx of CABG done in . a fib/flutter, Htn  Options provided:  -- Acute Myocardial Injury  -- Other - I will add my own diagnosis  -- Refer to " Clinical Documentation Reviewer    Query created by: Alycia Farrell on 7/14/2025 12:58 PM      Electronically signed by:  ANGEL CELESTIN MD PhD 7/14/2025 1:35 PM

## 2025-07-15 ENCOUNTER — PHARMACY VISIT (OUTPATIENT)
Dept: PHARMACY | Facility: CLINIC | Age: 80
End: 2025-07-15
Payer: COMMERCIAL

## 2025-07-15 LAB
ANION GAP SERPL CALC-SCNC: 11 MMOL/L (ref 10–20)
BUN SERPL-MCNC: 11 MG/DL (ref 6–23)
CALCIUM SERPL-MCNC: 9.1 MG/DL (ref 8.6–10.3)
CHLORIDE SERPL-SCNC: 101 MMOL/L (ref 98–107)
CO2 SERPL-SCNC: 26 MMOL/L (ref 21–32)
CREAT SERPL-MCNC: 1.07 MG/DL (ref 0.5–1.3)
EGFRCR SERPLBLD CKD-EPI 2021: 69 ML/MIN/1.73M*2
ERYTHROCYTE [DISTWIDTH] IN BLOOD BY AUTOMATED COUNT: 13.4 % (ref 11.5–14.5)
GLUCOSE SERPL-MCNC: 109 MG/DL (ref 74–99)
HCT VFR BLD AUTO: 37.9 % (ref 41–52)
HGB BLD-MCNC: 13.4 G/DL (ref 13.5–17.5)
MAGNESIUM SERPL-MCNC: 1.95 MG/DL (ref 1.6–2.4)
MCH RBC QN AUTO: 31.9 PG (ref 26–34)
MCHC RBC AUTO-ENTMCNC: 35.4 G/DL (ref 32–36)
MCV RBC AUTO: 90 FL (ref 80–100)
NRBC BLD-RTO: 0 /100 WBCS (ref 0–0)
PLATELET # BLD AUTO: 250 X10*3/UL (ref 150–450)
POTASSIUM SERPL-SCNC: 3.2 MMOL/L (ref 3.5–5.3)
RBC # BLD AUTO: 4.2 X10*6/UL (ref 4.5–5.9)
SODIUM SERPL-SCNC: 135 MMOL/L (ref 136–145)
UFH PPP CHRO-ACNC: 0.5 IU/ML (ref ?–1.1)
UFH PPP CHRO-ACNC: 0.6 IU/ML (ref ?–1.1)
WBC # BLD AUTO: 8 X10*3/UL (ref 4.4–11.3)

## 2025-07-15 PROCEDURE — 99152 MOD SED SAME PHYS/QHP 5/>YRS: CPT | Performed by: INTERNAL MEDICINE

## 2025-07-15 PROCEDURE — 93459 L HRT ART/GRFT ANGIO: CPT | Performed by: INTERNAL MEDICINE

## 2025-07-15 PROCEDURE — RXMED WILLOW AMBULATORY MEDICATION CHARGE

## 2025-07-15 PROCEDURE — C1760 CLOSURE DEV, VASC: HCPCS | Performed by: INTERNAL MEDICINE

## 2025-07-15 PROCEDURE — 2500000002 HC RX 250 W HCPCS SELF ADMINISTERED DRUGS (ALT 637 FOR MEDICARE OP, ALT 636 FOR OP/ED): Performed by: NURSE PRACTITIONER

## 2025-07-15 PROCEDURE — B2131ZZ FLUOROSCOPY OF MULTIPLE CORONARY ARTERY BYPASS GRAFTS USING LOW OSMOLAR CONTRAST: ICD-10-PCS | Performed by: INTERNAL MEDICINE

## 2025-07-15 PROCEDURE — 2500000001 HC RX 250 WO HCPCS SELF ADMINISTERED DRUGS (ALT 637 FOR MEDICARE OP): Performed by: NURSE PRACTITIONER

## 2025-07-15 PROCEDURE — 2500000004 HC RX 250 GENERAL PHARMACY W/ HCPCS (ALT 636 FOR OP/ED): Performed by: NURSE PRACTITIONER

## 2025-07-15 PROCEDURE — 2780000003 HC OR 278 NO HCPCS: Performed by: INTERNAL MEDICINE

## 2025-07-15 PROCEDURE — 36415 COLL VENOUS BLD VENIPUNCTURE: CPT | Performed by: INTERNAL MEDICINE

## 2025-07-15 PROCEDURE — 2500000001 HC RX 250 WO HCPCS SELF ADMINISTERED DRUGS (ALT 637 FOR MEDICARE OP): Performed by: INTERNAL MEDICINE

## 2025-07-15 PROCEDURE — 83735 ASSAY OF MAGNESIUM: CPT | Performed by: INTERNAL MEDICINE

## 2025-07-15 PROCEDURE — 99232 SBSQ HOSP IP/OBS MODERATE 35: CPT | Performed by: SURGERY

## 2025-07-15 PROCEDURE — 99232 SBSQ HOSP IP/OBS MODERATE 35: CPT | Performed by: FAMILY MEDICINE

## 2025-07-15 PROCEDURE — 2550000001 HC RX 255 CONTRASTS: Performed by: INTERNAL MEDICINE

## 2025-07-15 PROCEDURE — 85520 HEPARIN ASSAY: CPT | Performed by: INTERNAL MEDICINE

## 2025-07-15 PROCEDURE — 99232 SBSQ HOSP IP/OBS MODERATE 35: CPT | Performed by: PHYSICIAN ASSISTANT

## 2025-07-15 PROCEDURE — C1894 INTRO/SHEATH, NON-LASER: HCPCS | Performed by: INTERNAL MEDICINE

## 2025-07-15 PROCEDURE — C1769 GUIDE WIRE: HCPCS | Performed by: INTERNAL MEDICINE

## 2025-07-15 PROCEDURE — 2500000002 HC RX 250 W HCPCS SELF ADMINISTERED DRUGS (ALT 637 FOR MEDICARE OP, ALT 636 FOR OP/ED): Performed by: PHYSICIAN ASSISTANT

## 2025-07-15 PROCEDURE — 2500000001 HC RX 250 WO HCPCS SELF ADMINISTERED DRUGS (ALT 637 FOR MEDICARE OP): Performed by: PHYSICIAN ASSISTANT

## 2025-07-15 PROCEDURE — 80048 BASIC METABOLIC PNL TOTAL CA: CPT | Performed by: INTERNAL MEDICINE

## 2025-07-15 PROCEDURE — 2500000004 HC RX 250 GENERAL PHARMACY W/ HCPCS (ALT 636 FOR OP/ED): Performed by: INTERNAL MEDICINE

## 2025-07-15 PROCEDURE — 4A023N7 MEASUREMENT OF CARDIAC SAMPLING AND PRESSURE, LEFT HEART, PERCUTANEOUS APPROACH: ICD-10-PCS | Performed by: INTERNAL MEDICINE

## 2025-07-15 PROCEDURE — 2060000001 HC INTERMEDIATE ICU ROOM DAILY

## 2025-07-15 PROCEDURE — 2720000007 HC OR 272 NO HCPCS: Performed by: INTERNAL MEDICINE

## 2025-07-15 RX ORDER — FENTANYL CITRATE 50 UG/ML
INJECTION, SOLUTION INTRAMUSCULAR; INTRAVENOUS AS NEEDED
Status: DISCONTINUED | OUTPATIENT
Start: 2025-07-15 | End: 2025-07-15 | Stop reason: HOSPADM

## 2025-07-15 RX ORDER — LIDOCAINE HYDROCHLORIDE 20 MG/ML
INJECTION, SOLUTION INFILTRATION; PERINEURAL AS NEEDED
Status: DISCONTINUED | OUTPATIENT
Start: 2025-07-15 | End: 2025-07-15 | Stop reason: HOSPADM

## 2025-07-15 RX ORDER — MORPHINE SULFATE 2 MG/ML
2 INJECTION, SOLUTION INTRAMUSCULAR; INTRAVENOUS EVERY 6 HOURS PRN
Status: DISCONTINUED | OUTPATIENT
Start: 2025-07-15 | End: 2025-07-16 | Stop reason: HOSPADM

## 2025-07-15 RX ORDER — POTASSIUM CHLORIDE 20 MEQ/1
40 TABLET, EXTENDED RELEASE ORAL ONCE
Status: COMPLETED | OUTPATIENT
Start: 2025-07-15 | End: 2025-07-15

## 2025-07-15 RX ORDER — SODIUM CHLORIDE 9 MG/ML
1000 INJECTION, SOLUTION INTRAVENOUS ONCE AS NEEDED
Status: ACTIVE | OUTPATIENT
Start: 2025-07-15 | End: 2025-07-15

## 2025-07-15 RX ORDER — MIDAZOLAM HYDROCHLORIDE 1 MG/ML
INJECTION, SOLUTION INTRAMUSCULAR; INTRAVENOUS AS NEEDED
Status: DISCONTINUED | OUTPATIENT
Start: 2025-07-15 | End: 2025-07-15 | Stop reason: HOSPADM

## 2025-07-15 RX ORDER — SODIUM CHLORIDE 9 MG/ML
75 INJECTION, SOLUTION INTRAVENOUS CONTINUOUS
Status: ACTIVE | OUTPATIENT
Start: 2025-07-15 | End: 2025-07-15

## 2025-07-15 RX ADMIN — PIPERACILLIN SODIUM AND TAZOBACTAM SODIUM 3.38 G: 3; .375 INJECTION, SOLUTION INTRAVENOUS at 09:16

## 2025-07-15 RX ADMIN — AMLODIPINE BESYLATE 10 MG: 10 TABLET ORAL at 09:16

## 2025-07-15 RX ADMIN — METOPROLOL SUCCINATE 25 MG: 25 TABLET, EXTENDED RELEASE ORAL at 09:16

## 2025-07-15 RX ADMIN — METOPROLOL SUCCINATE 25 MG: 25 TABLET, EXTENDED RELEASE ORAL at 20:11

## 2025-07-15 RX ADMIN — PIPERACILLIN SODIUM AND TAZOBACTAM SODIUM 3.38 G: 3; .375 INJECTION, SOLUTION INTRAVENOUS at 20:12

## 2025-07-15 RX ADMIN — POTASSIUM CHLORIDE 40 MEQ: 20 TABLET, EXTENDED RELEASE ORAL at 09:35

## 2025-07-15 RX ADMIN — PIPERACILLIN SODIUM AND TAZOBACTAM SODIUM 3.38 G: 3; .375 INJECTION, SOLUTION INTRAVENOUS at 02:02

## 2025-07-15 RX ADMIN — EZETIMIBE 10 MG: 10 TABLET ORAL at 09:16

## 2025-07-15 RX ADMIN — SODIUM CHLORIDE 75 ML/HR: 0.9 INJECTION, SOLUTION INTRAVENOUS at 18:50

## 2025-07-15 RX ADMIN — APIXABAN 5 MG: 5 TABLET, FILM COATED ORAL at 20:11

## 2025-07-15 RX ADMIN — CHOLECALCIFEROL TAB 125 MCG (5000 UNIT) 125 MCG: 125 TAB at 09:16

## 2025-07-15 RX ADMIN — PANTOPRAZOLE SODIUM 40 MG: 40 INJECTION, POWDER, FOR SOLUTION INTRAVENOUS at 06:35

## 2025-07-15 RX ADMIN — CHLORTHALIDONE 25 MG: 25 TABLET ORAL at 09:16

## 2025-07-15 RX ADMIN — ASPIRIN 81 MG: 81 TABLET, DELAYED RELEASE ORAL at 09:16

## 2025-07-15 RX ADMIN — ATORVASTATIN CALCIUM 80 MG: 40 TABLET, FILM COATED ORAL at 20:11

## 2025-07-15 RX ADMIN — PIPERACILLIN SODIUM AND TAZOBACTAM SODIUM 3.38 G: 3; .375 INJECTION, SOLUTION INTRAVENOUS at 17:22

## 2025-07-15 ASSESSMENT — ENCOUNTER SYMPTOMS
SPEECH DIFFICULTY: 0
DIAPHORESIS: 0
WOUND: 0
EYE PAIN: 0
ABDOMINAL PAIN: 0
WEAKNESS: 0
AGITATION: 0
FLANK PAIN: 0
ARTHRALGIAS: 1
CONSTIPATION: 0
MYALGIAS: 1
CHILLS: 0
HALLUCINATIONS: 0
TROUBLE SWALLOWING: 0
BRUISES/BLEEDS EASILY: 0
BACK PAIN: 0
POLYPHAGIA: 0
DYSURIA: 0
SHORTNESS OF BREATH: 0
SORE THROAT: 0
FACIAL SWELLING: 0
NUMBNESS: 0
WHEEZING: 0
CONFUSION: 0
JOINT SWELLING: 0
FATIGUE: 0
HEADACHES: 0
PALPITATIONS: 0
NAUSEA: 0
COUGH: 0
CHEST TIGHTNESS: 0
DIZZINESS: 0
HEMATURIA: 0
APPETITE CHANGE: 0
DIARRHEA: 0
VOMITING: 0
ORTHOPNEA: 0
BLOOD IN STOOL: 0
FREQUENCY: 0
FEVER: 0
LIGHT-HEADEDNESS: 0
EYE REDNESS: 0

## 2025-07-15 ASSESSMENT — PAIN SCALES - GENERAL
PAINLEVEL_OUTOF10: 0 - NO PAIN

## 2025-07-15 ASSESSMENT — COGNITIVE AND FUNCTIONAL STATUS - GENERAL
MOBILITY SCORE: 24
MOBILITY SCORE: 24
DAILY ACTIVITIY SCORE: 24
DAILY ACTIVITIY SCORE: 24

## 2025-07-15 ASSESSMENT — PAIN - FUNCTIONAL ASSESSMENT
PAIN_FUNCTIONAL_ASSESSMENT: 0-10
PAIN_FUNCTIONAL_ASSESSMENT: 0-10

## 2025-07-15 NOTE — NURSING NOTE
Heparin Drip running at 14 mL /hour, 1400 units/hr, upon assuming care of pt, previously charted at 14 units per hour, but chart dose not match pump. Verified w another RN and pharmacy 14 mL /hr, 1400 units pr hour continues to run.

## 2025-07-15 NOTE — PROGRESS NOTES
Neftali Schmitt is a 80 y.o. male on day 5 of admission presenting with Calculus of gallbladder with acute cholecystitis without obstruction.      Subjective   Neftali Schmitt is a 80 y.o. male with PMHx s/f GERD coronary artery disease status post CABG, borderline diabetes, carotid and vertebral artery disease, hypertension, dyslipidemia presented 7/10/25 with right upper quadrant pain.  Patient frequently has fairly severe GERD symptoms does typically run from his throat down to his epigastrium.  This pain and discomfort he started having that morning was much worse and located differently.  Starting more laterally in the right epigastrium into the middle epigastrium the patient developed sharp pain.  He did have some nausea.  No vomiting.  He denies any fevers but has had some chills for the last couple days.  The patient has not noticed any pain similar to this associated with any meals prior to this.  He is denying any chest pain or shortness of breath no diarrhea or urinary symptoms.  In the emergency department the initial CT scan was suggestive of acute cholecystitis the ultrasound was not suggestive of cholecystitis. The ED provider discussed the case with surgery HIDA scan was ordered.  Surgery advised to place the patient on antibiotic therapy for now and if needed patient might need to go for cholecystotomy tube and cholecystectomy at a later time. alk phos 68, ALT 20, AST 18, bilirubin 1.8.    7/11/2025: No acute events overnight. Vitals stable, afebrile. CBC/CMP reviewed, bili 1.9. HSTI 58>>49. No leukocytosis. HIDA abnormal with acute cholecystitis with obstructive cystic duct. Surgery ordered IR guided cholecystostomy tube however patient declined.  General surgery planning antibiotic therapy only for now with delayed cholecystectomy given his advanced age and underlying cardiac issues also given the fact that he did respond well in regards to pain and normalization of his white blood cell  count with antibiotics alone.  Patient advised that he does have an increased risk of failure with oral antibiotics alone, he verbalized understanding.  He has an office appointment arranged with Dr. Mata for 7/22/2025  HR noted to be 130's, ECG obtained revealed paroxysmal atrial fibrillation/flutter- new diagnosis, Dr. Stewart was in to see patient and gave IV metoprolol x1 and started on heparin drip, per his note he offered CARRI/cardioversion although patient stated he did not want to pursue at this time as he believes his elevated heart rate was related to being upset regarding the recommendation of a cholecystostomy tube. Continue with rate control only for now, can consider rhythm control if needed in the future     Addendum: Patient converted back to NSR with heart rate in the 60s.  He is again asking for discharge to home.  I discussed the case with his cardiologist Dr. Stewart who saw him this morning as patient is scheduled for a stress test however unfortunately he had caffeine this morning so cannot have the stress test inpatient until Monday as the we do not run these on the weekend.  Dr. Stewart advises that stress test should be completed prior to discharge rather than as an outpatient as patient with known CAD history, was having chest pain with arrhythmia, ST depression on ECG with arrhythmia, and mildly abnormal troponin on admission. Patient would need to leave AGAINST MEDICAL ADVICE if he is not willing to complete workup.     7/12/2025: No acute events overnight. Vitals stable, afebrile, HR 60's. CBC/BMP reviewed, K 2.7 with mag 1.64- both replaced by nocturnist. No leukocytosis. Echo with poorly visualized anatomical structures due to suboptimal image quality. EF 40-45%. Stress test Monday  Rounded on patient on this date with RN     7/13: Pain well controlled. He is without acute complaint.     7/15:              Today the patient is seen following his [facility] awake alert and interactive appropriately  in the presence of multiple family members.  Voices no specific complaints and no acute events overnight.  [facility] describes severe triple-vessel disease.  No interventions.  Recommending medical management and follow-up as outpatient with consideration for stent of the LCx  if becomes symptomatic.  This was explained to the patient and family.  Surgery at this point describing will need 10-day course of antibiotics.  Patient declined cholecystostomy tube.  Likely to follow-up with Dr. Mata on 7/22 and otherwise likely planning cholecystectomy follow-up in 4 to 6 weeks.    Review of Systems   Constitutional:  Negative for appetite change, chills, diaphoresis, fatigue and fever.   HENT:  Negative for congestion, ear pain, facial swelling, hearing loss, nosebleeds, sore throat, tinnitus and trouble swallowing.    Eyes:  Negative for pain.   Respiratory:  Negative for cough, chest tightness, shortness of breath and wheezing.    Cardiovascular:  Negative for chest pain, palpitations and leg swelling.   Gastrointestinal:  Negative for abdominal pain, blood in stool, constipation, diarrhea, nausea and vomiting.   Genitourinary:  Negative for dysuria, flank pain, frequency, hematuria and urgency.   Musculoskeletal:  Negative for back pain and joint swelling.   Skin:  Negative for rash and wound.   Neurological:  Negative for dizziness, syncope, weakness, light-headedness, numbness and headaches.   Hematological:  Does not bruise/bleed easily.   Psychiatric/Behavioral:  Negative for behavioral problems, hallucinations and suicidal ideas.         Objective     Last Recorded Vitals  /77   Pulse 70   Temp 36.8 °C (98.2 °F) (Temporal)   Resp 16   Wt 88 kg (194 lb 0.1 oz)   SpO2 97%     Image Results  Imaging  Nuclear Stress Test  Result Date: 7/14/2025  Medium-sized area of reversible perfusion defect, consistent with ischemia in left circumflex territory. Mild left ventricular systolic dysfunction on post stress  gated imaging. 1. Pertinent findings if any on low-dose non gated CT images-severe coronary artery calcification. Calcification of the thoracic aorta. Small left-sided pleural effusion.       Signed by: Nestor Humphrey 7/14/2025 5:44 PM Dictation workstation:   [id number]    NM hepatobiliary  Result Date: 7/10/2025  Abnormal hepatobiliary imaging, finding compatible with acute cholecystitis with obstructive cystic duct Normal hepatic uptake function and patent biliary system     MACRO: Critical Finding:  See findings. Notification was initiated on 7/10/2025 at 4:31 pm by  Tabatha Romo.  (**-OCF-**) Instructions:   Signed by: Tabatha Romo 7/10/2025 4:33 PM Dictation workstation:   [id number]    US gallbladder  Result Date: 7/10/2025  Gallbladder sludge with borderline gallbladder wall thickening.  No cholelithiasis, gallbladder wall thickening, or biliary dilatation is appreciated.  Given the findings on CT, consider further evaluation with a HIDA scan. Increased echogenicity of the right renal cortex consistent with intrinsic renal disease.  No hydronephrosis. Right renal cyst measuring 5.3 x 2.6 x 4.1 cm. Diffuse hepatic steatosis. Signed by Juan Antonio Rodarte MD    CT abdomen pelvis w IV contrast  Result Date: 7/10/2025  1.Distended gallbladder with questionable underlying gallstones, gallbladder wall thickening and mild pericholecystic haziness. Findings suggestive of acute cholecystitis.  Recommend further evaluation with right upper quadrant ultrasound. 2.Mild biliary dilatation with the common bile duct measuring up to 9 to 10 mm without definite choledocholithiasis. Signed by Juan Antonio Rodarte MD    XR chest 2 views  Result Date: 7/10/2025  Opacity in the right posterior costophrenic angle appears increased, possibly small enlarging pleural effusion or focal consolidation. Suggest radiographic follow-up to resolution.   MACRO: None   Signed by: Lewis Chang 7/10/2025 10:02 AM Dictation workstation:    [id number]      Cardiology, Vascular, and Other Imaging  Cardiology Interpretation Of Nuclear Stress - See Other Report For Nuclear Portion  Result Date: 7/15/2025              [place] [address]      Phone [phone] Fax [phone] Nuclear Pharmacologic Stress Test Patient Name:      ANEUDY MCCORMICK   Ordering Provider:     [id number] KRISTA MCCLAIN Study Date:        7/14/2025            Reading Physician:     Deisy Humphrey MD MRN/PID:           [id number]             Supervising Physician: Deisy Humphrey MD Accession#:        [id number]         Fellow: Date of Birth/Age: 1945 / 80 years Fellow: Gender:            M                    Nurse:                 Cecile Frey Admission Status:  Outpatient           Sonographer:           JARVIS Height:            168.00 cm            Technologist: Weight:            90.30 kg             Additional Staff: BSA:               2.00 m2              Encounter#:            [id number] BMI:               31.99 kg/m2          Patient Location:      [facility] Study Type:    CARDIOLOGY INTERPRETATION OF NUCLEAR STRESS Diagnosis/ICD: Atherosclerotic heart disease of native coronary artery with                other forms of angina pectoris-I25.118 Indication:    CAD Falls Risk: Low: Patient has low risk for sustaining a fall; environmental safety interventions in place.  Study Details: Correct procedure and correct patient verified verbally and with                ID Band checked.  Patient History: Coronary artery disease, hypertension, peripheral vascular disease, vertigo, and dyslipidemia. Allergies: Losartan. BMI:       Obese >30.  Medications: Noravasc, ASA, lipitor, hygroton, vit D, zetia, antivert, toprol XL,  protonix, zosyn.  Patient Performance: Patient received a total of 0.4 mg of Regadenoson at 8:53:59 AM. The peak heart rate achieved was 93 bpm, which was 67 % of the age predicted target heart rate of 138 bpm. The resting blood pressure was 155/76 mmHg with a heart rate of 60 bpm. The patient developed no symptoms during the stress exam. The blood pressure response was normal. The test was terminated due to: end of vasodilator infusion. Patient has met the discharge criteria and is discharged to their floor.  Baseline ECG: Resting ECG showed normal sinus rhythm with occasional premature ventricular contractions, nonspecific ST-T wave changes and prolonged QT.  Stress ECG: Stress ECG showed normal sinus rhythm, with occ PVC. There was a 1.0 upsloping ST segment depression in leads III, AVF, V4 and V5 during the peak stress period.  Stress Stage Data: +----------------+--+------+-------+                 HRSys BPDias BP +----------------+--+------+-------+ Baseline [id number]   76      +----------------+--+------+-------+ Stage I         [id number]   50      +----------------+--+------+-------+ Stage II        [id number]   53      +----------------+--+------+-------+ Stage III       [id number]   62      +----------------+--+------+-------+ Stage IV        [id number]   68      +----------------+--+------+-------+ Stage V         [id number]   70      +----------------+--+------+-------+  Summary:  1. Correlate with myocardial perfusion imaging results.  2. ECG changes consistent with ischemia.  3. No clinical evidence for ischemia at maximal infusion.  4. Nuclear image results are reported separately. [id number] Nestor Humphrey MD Electronically signed on 7/15/2025 at 12:06:53 PM   ** Final **     Transthoracic Echo Complete  Result Date: 7/11/2025              [place] [id number]      Phone [phone] Fax [phone] TRANSTHORACIC ECHOCARDIOGRAM REPORT  Patient Name:       ANEUDY CARPIOJESSICA   Reading Physician:    [id number] Bradly Magdaleno MD Study Date:         7/11/2025            Ordering Provider:    [id number] LEOPOLDO RHINA COHN MRN/PID:            [id number]             Fellow: Accession#:         [id number]         Nurse: Date of Birth/Age:  1945 / 80 years Sonographer:          Lorna Mosher RDCS Gender Assigned at                      Additional Staff: Birth: Height:             167.64 cm            Admit Date:           7/10/2025 Weight:             90.72 kg             Admission Status:     Inpatient -                                                                Routine BSA / BMI:          2.00 m2 / 32.28      Department Location:  [place] [address] [address]                     kg/m2 Blood Pressure: 154 /78 mmHg Study Type:    TRANSTHORACIC ECHO (TTE) COMPLETE Diagnosis/ICD: Shortness of breath-R06.02 Indication:    CHEST PAIN CPT Codes:     Echo Complete w Full Doppler-[id number] Patient History: Pertinent History: CAD and HTN. Study Detail: The following Echo studies were performed: 2D, M-Mode, Doppler and               color flow. Technically challenging study due to prominent lung               artifact.  PHYSICIAN INTERPRETATION: Left Ventricle: The left ventricular systolic function is mildly decreased with a visually estimated ejection fraction of 40-45%. The left ventricular cavity size is normal. There is mild increased septal and normal posterior left ventricular wall thickness. There is left ventricular concentric remodeling. Left ventricular diastolic filling was indeterminate. Left Atrium: The left atrial size is normal. Right Ventricle: The right ventricle was not well visualized. RV not well visualized. Right Atrium: The right atrial size is normal. Aortic Valve: The  aortic valve is trileaflet. The aortic valve area by VTI is 1.98 cmï¿½ with a peak velocity of 1.26 m/s. The peak and mean gradients are 6 mmHg and 3 mmHg, respectively, with a dimensionless index of 0.63. There is mild aortic valve cusp calcification. There is trace aortic valve regurgitation. Mitral Valve: The mitral valve is normal in structure. There is mild to moderate mitral valve regurgitation. The E Vmax is 1.07 m/s. Tricuspid Valve: The tricuspid valve is structurally normal. There is trace tricuspid regurgitation. The Doppler estimated right ventricular systolic pressure (RVSP) is within normal limits at 24 mmHg. Pulmonic Valve: The pulmonic valve is structurally normal. There is no indication of pulmonic valve regurgitation. Pericardium: No pericardial effusion noted. Aorta: The aortic root is normal. Systemic Veins: The inferior vena cava was not well visualized, IVC inspiratory collapse is not well visualized.  CONCLUSIONS:  1. Poorly visualized anatomical structures due to suboptimal image quality.  2. The left ventricular systolic function is mildly decreased with a visually estimated ejection fraction of 40-45%.  3. Left ventricular diastolic filling was indeterminate.  4. Mild to moderate mitral valve regurgitation.  5. The Doppler estimated RVSP is within normal limits at 24 mmHg. QUANTITATIVE DATA SUMMARY:  2D MEASUREMENTS:             Normal Ranges: LAs:             4.05 cm     (2.7-4.0cm) IVSd:            1.10 cm     (0.6-1.1cm) LVPWd:           0.97 cm     (0.6-1.1cm) LVIDd:           4.54 cm     (3.9-5.9cm) LVIDs:           3.82 cm LV Mass Index:   82.0 g/m2 LVEDV Index:     26.79 ml/m2 LV % FS          15.9 %  LEFT ATRIUM:                  Normal Ranges: LA Vol A4C:        39.6 ml    (22+/-6mL/m2) LA Vol A2C:        37.3 ml LA Vol BP:         40.3 ml LA Vol Index A4C:  19.8ml/m2 LA Vol Index A2C:  18.7 ml/m2 LA Vol Index BP:   20.2 ml/m2 LA Area A4C:       15.1 cm2 LA Area A2C:       15.4 cm2  LA Major Axis A4C: 4.9 cm LA Major Axis A2C: 5.4 cm LA Volume Index:   20.2 ml/m2 LA Vol A4C:        38.0 ml LA Vol A2C:        36.6 ml LA Vol Index BSA:  18.7 ml/m2  RIGHT ATRIUM:          Normal Ranges: RA Area A4C:  14.4 cm2  AORTA MEASUREMENTS:         Normal Ranges: Ao Sinus, d:        3.20 cm (2.1-3.5cm) Ao STJ, d:          3.20 cm (1.7-3.4cm) Asc Ao, d:          3.50 cm (2.1-3.4cm)  LV SYSTOLIC FUNCTION:                      Normal Ranges: EF-A4C View:    38 % (>=55%) EF-A2C View:    40 % EF-Biplane:     35 % EF-Visual:      43 % LV EF Reported: 43 %  LV DIASTOLIC FUNCTION:           Normal Ranges: MV Peak E:             1.07 m/s  (0.7-1.2 m/s) MV e'                  0.083 m/s (>8.0) MV lateral e'          0.08 m/s MV medial e'           0.09 m/s E/e' Ratio:            12.86     (<8.0)  MITRAL VALVE:          Normal Ranges: MV DT:        141 msec (150-240msec)  MITRAL INSUFFICIENCY:             Normal Ranges: MR VTI:               143.17 cm MR Vmax:              496.16 cm/s  AORTIC VALVE:                     Normal Ranges: AoV Vmax:                1.26 m/s (<=1.7m/s) AoV Peak P.3 mmHg (<20mmHg) AoV Mean PG:             3.5 mmHg (1.7-11.5mmHg) LVOT Max Bernard:            0.70 m/s (<=1.1m/s) AoV VTI:                 22.13 cm (18-25cm) LVOT VTI:                14.02 cm LVOT Diameter:           1.99 cm  (1.8-2.4cm) AoV Area, VTI:           1.98 cm2 (2.5-5.5cm2) AoV Area,Vmax:           1.75 cm2 (2.5-4.5cm2) AoV Dimensionless Index: 0.63  RIGHT VENTRICLE: TAPSE: 20.1 mm RV s'  0.11 m/s  TRICUSPID VALVE/RVSP:          Normal Ranges: Peak TR Velocity:     2.29 m/s Est. RA Pressure:     3 mmHg RV Syst Pressure:     24 mmHg  (< 30mmHg)  AORTA: Asc Ao Diam 3.52 cm  [id number] Bradly Magdaleno MD Electronically signed on 2025 at 5:31:44 PM  ** Final **     ECG 12 lead  Result Date: 2025  Atrial flutter/fibrillation with RVR Repolarization abnormality, prob rate related Prolonged QT interval Confirmed by  James Stewart ([id number]) on 7/11/2025 1:02:34 PM         Lab Results  Results for orders placed or performed during the hospital encounter of 07/10/25 (from the past 24 hours)   Heparin Assay, UFH   Result Value Ref Range    Heparin Unfractionated 0.5 See Comment Below for Therapeutic Ranges IU/mL   CBC   Result Value Ref Range    WBC 8.0 4.4 - 11.3 x10*3/uL    nRBC 0.0 0.0 - 0.0 /100 WBCs    RBC 4.20 (L) 4.50 - 5.90 x10*6/uL    Hemoglobin 13.4 (L) 13.5 - 17.5 g/dL    Hematocrit 37.9 (L) 41.0 - 52.0 %    MCV 90 80 - 100 fL    MCH 31.9 26.0 - 34.0 pg    MCHC 35.4 32.0 - 36.0 g/dL    RDW 13.4 11.5 - 14.5 %    Platelets 250 150 - 450 x10*3/uL   Heparin Assay, UFH   Result Value Ref Range    Heparin Unfractionated 0.5 See Comment Below for Therapeutic Ranges IU/mL   Basic Metabolic Panel   Result Value Ref Range    Glucose 109 (H) 74 - 99 mg/dL    Sodium 135 (L) 136 - 145 mmol/L    Potassium 3.2 (L) 3.5 - 5.3 mmol/L    Chloride 101 98 - 107 mmol/L    Bicarbonate 26 21 - 32 mmol/L    Anion Gap 11 10 - 20 mmol/L    Urea Nitrogen 11 6 - 23 mg/dL    Creatinine 1.07 0.50 - 1.30 mg/dL    eGFR 69 >60 mL/min/1.73m*2    Calcium 9.1 8.6 - 10.3 mg/dL   Magnesium   Result Value Ref Range    Magnesium 1.95 1.60 - 2.40 mg/dL   Heparin Assay, UFH   Result Value Ref Range    Heparin Unfractionated 0.6 See Comment Below for Therapeutic Ranges IU/mL        Medications  Scheduled medications:  Scheduled Medications[1]  Continuous medications:  Continuous Medications[2]  PRN medications:  PRN Medications[3]     Physical Exam  Constitutional:       General: He is not in acute distress.     Appearance: Normal appearance.      Comments: Seen in bed   HENT:      Head: Normocephalic and atraumatic.      Right Ear: External ear normal.      Left Ear: External ear normal.      Nose: Nose normal.      Mouth/Throat:      Mouth: Mucous membranes are moist.      Pharynx: Oropharynx is clear.   Eyes:      Extraocular Movements: Extraocular movements intact.       Conjunctiva/sclera: Conjunctivae normal.      Pupils: Pupils are equal, round, and reactive to light.   Cardiovascular:      Rate and Rhythm: Rhythm irregular.      Pulses: Normal pulses.      Heart sounds: Normal heart sounds.   Pulmonary:      Effort: Pulmonary effort is normal. No respiratory distress.      Breath sounds: Normal breath sounds. No wheezing, rhonchi or rales.   Abdominal:      General: Bowel sounds are normal.      Palpations: Abdomen is soft.      Tenderness: There is no abdominal tenderness (RUQ). There is no right CVA tenderness, left CVA tenderness, guarding or rebound.      Comments: No further abdominal tenderness   Musculoskeletal:         General: No swelling. Normal range of motion.      Cervical back: Normal range of motion and neck supple.   Skin:     General: Skin is warm and dry.      Capillary Refill: Capillary refill takes less than 2 seconds.      Findings: No rash.   Neurological:      General: No focal deficit present.      Mental Status: He is alert and oriented to person, place, and time. Mental status is at baseline.   Psychiatric:         Mood and Affect: Mood normal.         Behavior: Behavior normal.              Assessment/Plan   This patient currently has cardiac telemetry ordered; if you would like to modify or discontinue the telemetry order, click here to go to the orders activity to modify/discontinue the order.    Acute cholecystitis  CT scan suggestive of acute cholecystitis, US undetermined, HIDA confirms dx  Surgery consulted  IV zosyn  Diet per general surgery service  As needed morphine and Zofran   Monitor fever/WBC curve  Total bili slightly elevated, direct bili WNL  Surgery ordered IR guided cholecystostomy tube however patient declined  General surgery planning antibiotic therapy only for now with delayed cholecystectomy given his advanced age and underlying cardiac issues also given the fact that he did respond well in regards to pain and normalization  of his white blood cell count with antibiotics alone  Patient advised that he does have an increased risk of failure with oral antibiotics alone, he verbalized understanding  He has an office appointment arranged with Dr. Mata for 7/22/2025 to discuss scheduling gallbladder surgery    Paroxysmal atrial fibrillation/flutter-newly diagnosed  ECG showed atrial flutter with a heart rate of 137 bpm and ST segment depression in V2 through V6.   Heparin drip  IV beta-blocker x1  Increase PO metoprolol dose  Tele monitoring   Cardiology on board, offered CARRI but patient declined at this time- reevaluate possibility of rhythm control at a later time  Back to normal sinus rhythm 7/11/25  7/15: Once again A-fib    Hypokalemia/Hypomagnesemia   Replete PRN    Coronary artery disease status post CABG   Mildly elevated troponin  Asa, statin, BB  Troponin leak thought secondary to demand ischemia patient does not appear to have active chest pain   ECG nonischemic  Patient did not want to get an Echo  Stress test ordered-this will be done on Monday, 7/14/2025  7/15: [facility] today describes severe triple-vessel disease.  No interventions.  Describes medical management and follow-up outpatient.  Consideration for stent to the LCx  if symptomatic.    HTN  Continue home medications  Monitor BP  Adjust as needed     DLD  Statin as above  Monitor LFTs    Code Status: Full Code     Eze Baez MD         [1] amLODIPine, 10 mg, oral, Daily  apixaban, 5 mg, oral, BID  aspirin, 81 mg, oral, Daily  atorvastatin, 80 mg, oral, Nightly  chlorthalidone, 25 mg, oral, Daily  cholecalciferol, 125 mcg, oral, Daily  ezetimibe, 10 mg, oral, Daily  metoprolol succinate XL, 25 mg, oral, BID  pantoprazole, 40 mg, oral, Daily before breakfast   Or  pantoprazole, 40 mg, intravenous, Daily before breakfast  perflutren lipid microspheres, 0.5-10 mL of dilution, intravenous, Once in imaging  perflutren protein A microsphere, 0.5 mL, intravenous, Once in  imaging  piperacillin-tazobactam, 3.375 g, intravenous, q6h  polyethylene glycol, 17 g, oral, Daily  sulfur hexafluoride microsphr, 2 mL, intravenous, Once in imaging     [2]    [3] PRN medications: aminophylline, bisacodyl, bisacodyl, guaiFENesin, melatonin, morphine, ondansetron **OR** ondansetron

## 2025-07-15 NOTE — CARE PLAN
Problem: Pain - Adult  Goal: Verbalizes/displays adequate comfort level or baseline comfort level  Outcome: Progressing     Problem: Safety - Adult  Goal: Free from fall injury  Outcome: Progressing     Problem: Discharge Planning  Goal: Discharge to home or other facility with appropriate resources  Outcome: Progressing     Problem: Chronic Conditions and Co-morbidities  Goal: Patient's chronic conditions and co-morbidity symptoms are monitored and maintained or improved  Outcome: Progressing     Problem: Nutrition  Goal: Nutrient intake appropriate for maintaining nutritional needs  Outcome: Progressing     Problem: Pain  Goal: Takes deep breaths with improved pain control throughout the shift  Outcome: Progressing  Goal: Turns in bed with improved pain control throughout the shift  Outcome: Progressing  Goal: Walks with improved pain control throughout the shift  Outcome: Progressing  Goal: Performs ADL's with improved pain control throughout shift  Outcome: Progressing  Goal: Participates in PT with improved pain control throughout the shift  Outcome: Progressing  Goal: Free from opioid side effects throughout the shift  Outcome: Progressing  Goal: Free from acute confusion related to pain meds throughout the shift  Outcome: Progressing     Problem: Diabetes  Goal: Achieve decreasing blood glucose levels by end of shift  Outcome: Progressing  Goal: Increase stability of blood glucose readings by end of shift  Outcome: Progressing  Goal: Decrease in ketones present in urine by end of shift  Outcome: Progressing  Goal: Maintain electrolyte levels within acceptable range throughout shift  Outcome: Progressing  Goal: Maintain glucose levels >70mg/dl to <250mg/dl throughout shift  Outcome: Progressing  Goal: No changes in neurological exam by end of shift  Outcome: Progressing  Goal: Learn about and adhere to nutrition recommendations by end of shift  Outcome: Progressing  Goal: Vital signs within normal range for  age by end of shift  Outcome: Progressing  Goal: Increase self care and/or family involovement by end of shift  Outcome: Progressing  Goal: Receive DSME education by end of shift  Outcome: Progressing     Problem: Pain - Adult  Goal: Verbalizes/displays adequate comfort level or baseline comfort level  Outcome: Progressing     Problem: Safety - Adult  Goal: Free from fall injury  Outcome: Progressing     Problem: Discharge Planning  Goal: Discharge to home or other facility with appropriate resources  Outcome: Progressing     Problem: Chronic Conditions and Co-morbidities  Goal: Patient's chronic conditions and co-morbidity symptoms are monitored and maintained or improved  Outcome: Progressing     Problem: Nutrition  Goal: Nutrient intake appropriate for maintaining nutritional needs  Outcome: Progressing     Problem: Pain  Goal: Takes deep breaths with improved pain control throughout the shift  Outcome: Progressing  Goal: Turns in bed with improved pain control throughout the shift  Outcome: Progressing  Goal: Walks with improved pain control throughout the shift  Outcome: Progressing  Goal: Performs ADL's with improved pain control throughout shift  Outcome: Progressing  Goal: Participates in PT with improved pain control throughout the shift  Outcome: Progressing  Goal: Free from opioid side effects throughout the shift  Outcome: Progressing  Goal: Free from acute confusion related to pain meds throughout the shift  Outcome: Progressing     Problem: Diabetes  Goal: Achieve decreasing blood glucose levels by end of shift  Outcome: Progressing  Goal: Increase stability of blood glucose readings by end of shift  Outcome: Progressing  Goal: Decrease in ketones present in urine by end of shift  Outcome: Progressing  Goal: Maintain electrolyte levels within acceptable range throughout shift  Outcome: Progressing  Goal: Maintain glucose levels >70mg/dl to <250mg/dl throughout shift  Outcome: Progressing  Goal: No  changes in neurological exam by end of shift  Outcome: Progressing  Goal: Learn about and adhere to nutrition recommendations by end of shift  Outcome: Progressing  Goal: Vital signs within normal range for age by end of shift  Outcome: Progressing  Goal: Increase self care and/or family involovement by end of shift  Outcome: Progressing  Goal: Receive DSME education by end of shift  Outcome: Progressing

## 2025-07-15 NOTE — PROGRESS NOTES
PROGRESS NOTE    HPI:  Neftali Schmitt is a 80 y.o. male who presented to the emergency department with right upper quadrant abdominal pain.  Patient states that around 2 AM he woke up with right-sided pain.  He drink some ginger ale with some improvement of his symptoms but as it was not completely resolved he decided to come to the emergency department for evaluation.  Patient was subsequently found to have evidence of acute cholecystitis.  Patient was seen by general surgery who recommended cholecystostomy tube and antibiotic therapy followed by outpatient surgery in 4 to 6 weeks.  Patient declined undergoing cholecystostomy tube.  Cardiology was consulted for abnormal troponin.  When I went to evaluate the patient he was noted to be tachycardic with telemetry showing what appeared to be atrial flutter with heart rate of 140 bpm.  ECG done this morning showed atrial flutter with a heart rate of 137 bpm.  Started the patient on a heparin infusion and gave IV metoprolol.  Telemetry now showing atrial fibrillation with heart rate in the 120s.  BMP showing a serum sodium of 137, serum potassium 3.6, serum creatinine is 0.94.  INR was 1.1.  Troponin was 58-49.  CBC showed hemoglobin of 13.3. Chest x-ray done 7/10/2025 showed opacity in the right posterior costophrenic angle, possible small enlarging pleural effusion or focal consolidation.  CT scan of the abdomen/pelvis done 7/10/2025 showed distended gallbladder with questionable underlying gallstones, gallbladder wall thickening and mild pericholecystic haziness suggestive of acute cholecystitis, mild biliary dilatation with the common bile duct measuring up to 10 mm.  Ultrasound of the gallbladder done 7/10/2025 showed gallbladder sludge with borderline gallbladder wall thickening, no cholelithiasis, increased echogenicity of the right renal cortex, no hydronephrosis, renal cyst, diffuse hepatic steatosis.  Nuclear medicine scan done 7/10/2025 was abnormal with  findings consistent with acute cholecystitis.  During my exam patient was resting in a bedside chair.     Subjective Data:  Patient telemetry shows that he has converted back to sinus rhythm.  Patient has no cardiac symptoms such as chest pain palpitations or shortness of breath.  If he pushes on his belly hard enough still some mild right upper quadrant pain but while I am talking to him he is eating pancakes and sausage with no GI complaints.  He remains on a heparin infusion and plan is for stress testing on Monday.  7-13-25: Patient sitting up in chair.  Remains on heparin infusion.  No cardiac complaints or concerns.  Telemetry sinus bradycardia 58 plan for stress test in the morning.  7-14-25:  Patient seen in stress lab.  No cardiac complaints.  No GI complaints.  Reviewed briefly with surgery.  If stress is OK he can be discharged however if abnormal will need to discuss LHC.    7-15-25: Patient resting comfortably.  Stress testing yesterday shows lateral ischemia.  Review of old records show that the patient had remote bypass surgery in 2007 with LIMA to LAD vein graft to the obtuse marginal and vein graft to the PDA.  No cardiac symptoms whatsoever.  Telemetry shows sinus rhythm 65.  He is n.p.o. and after discussing the stress results with him he is agreeable to proceed with left heart catheterization.    Overnight Events:    None     Objective Data:  Last Recorded Vitals:  Vitals:    07/14/25 1934 07/14/25 2335 07/15/25 0501 07/15/25 0751   BP: 130/56 123/64 116/65 128/75   BP Location: Right arm Left arm Left arm Right arm   Patient Position: Sitting Lying Lying Lying   Pulse: 58 67 75 72   Resp: 18 18 18 18   Temp: 36.4 °C (97.5 °F) 36.6 °C (97.9 °F) 36.6 °C (97.8 °F) 36.6 °C (97.8 °F)   TempSrc: Temporal Temporal Temporal Temporal   SpO2: 95% 96% 98% 95%   Weight:   88 kg (194 lb 0.1 oz)    Height:           Last Labs:  CBC - 7/14/2025: 11:58 PM  8.0 13.4 250    37.9      CMP - 7/15/2025:  4:44  AM  9.1 6.5 21 --- 1.9   _ 3.9 24 71      PTT - 7/11/2025: 10:25 AM  1.1   12.6 35     Last I/O:  I/O last 3 completed shifts:  In: 890 (10.1 mL/kg) [P.O.:240; IV Piggyback:650]  Out: - (0 mL/kg)   Weight: 88 kg     Past Cardiology Tests (Last 3 Years):  Echo: CONCLUSIONS:   1. Poorly visualized anatomical structures due to suboptimal image quality.   2. The left ventricular systolic function is mildly decreased with a visually estimated ejection fraction of 40-45%.   3. Left ventricular diastolic filling was indeterminate.   4. Mild to moderate mitral valve regurgitation.   5. The Doppler estimated RVSP is within normal limits at 24 mmHg.  Noted above change in EF dating back to echo from 2021 when his echo showed normal LV systolic function    STRESS YESTERDAY--IMPRESSION:  Medium-sized area of reversible perfusion defect, consistent with  ischemia in left circumflex territory. Mild left ventricular systolic  dysfunction on post stress gated imaging.  1. Pertinent findings if any on low-dose non gated CT images-severe  coronary artery calcification. Calcification of the thoracic aorta.  Small left-sided pleural effusion.    Inpatient Medications:  Scheduled Medications[1]    Review of Systems   Constitutional: Negative for fever and malaise/fatigue.   Cardiovascular:  Negative for chest pain, orthopnea and palpitations.   Respiratory:  Negative for shortness of breath and wheezing.    Skin:  Negative for itching and rash.   Gastrointestinal:  Negative for abdominal pain, diarrhea, nausea and vomiting.   Genitourinary:  Negative for dysuria.   Neurological:  Negative for weakness.        Physical Exam  Constitutional:       General: He is not in acute distress.  HENT:      Mouth/Throat:      Mouth: Mucous membranes are moist.   Neck:      Comments: Flat neck veins  Cardiovascular:      Rate and Rhythm: Normal rate and regular rhythm.      Heart sounds: Normal heart sounds. No murmur heard.  Pulmonary:      Effort:  Pulmonary effort is normal.      Breath sounds: Normal breath sounds.   Abdominal:      General: Abdomen is flat. Bowel sounds are normal.      Palpations: Abdomen is soft.      Comments: No abdominal pain   Musculoskeletal:         General: No swelling.   Skin:     General: Skin is warm and dry.   Neurological:      Mental Status: He is alert and oriented to person, place, and time.   Psychiatric:         Mood and Affect: Mood normal.        ASSESSMENT/PLAN  1.  Paroxysmal atrial fibrillation/flutter  Patient is newly diagnosed with paroxysmal atrial fibrillation/flutter.  Patient started on a heparin infusion for anticoagulation.  Patient given IV beta-blocker to help lower heart rate.  I will increase beta-blocker therapy.  I discussed with him possible CARRI/cardioversion although patient stated he did not want to pursue at this time as he believes his elevated heart rate was related to being upset regarding the recommendation of a cholecystostomy tube.  Will continue to adjust medications for rate control for now.  Will rediscuss rhythm control approach with patient when less upset.  7-12-25: Patient back in sinus rhythm.  No cardiac complaints otherwise.  Continue heparin infusion until we get stress results on Monday and if there is no evidence of ischemia can transition to Eliquis.  7-13-25: Remains in sinus rhythm.  Continue heparin.  Stress testing in the a.m.  If negative stress test needs transition to Eliquis.    7-14-25:  No further AF.  Continue meds.  Minor nosebleed overnight on heparin.  If stress is negative will need transition to eliquis.  If abnormal keep on heparin.  7-15-25: Patient remains in sinus rhythm and remains on a heparin infusion.     2.  Coronary artery disease/elevated troponin  The patient has a history of coronary artery disease status post bypass done in 2007.  Patient now found to have mildly abnormal troponin of 58-49.  Patient denies any anginal chest discomfort.  ECG done  this morning at 848 am showed atrial flutter with a heart rate of 137 bpm and ST segment depression in V2 through V6.  Will check echocardiogram and stress study for additional evaluation.  7-12-25:  No chest pain or angina however his echocardiogram shows EF of 40 to 45% which is changed from his last echo in 2021 at which time EF was normal.  No specific wall motion abnormalities noted on the echo  7-13-25: No cardiac symptoms.  Again stress test in the morning.  7-14-25:  Further plan pending outcome of stress.  7-15-25: Lateral ischemia noted on stress testing.  He did have remote bypass surgery in 2007 with LIMA to LAD vein graft to the obtuse marginal and vein graft to the PDA.  Left heart catheterization today.     3.  Carotid artery disease/vertebral artery disease  Stable, continue with risk factor modification     4.  Hypertension  The patient has a history of hypertension which appears under moderate control.  Continue to monitor and adjust antihypertensive medical therapy while here.  7-12-25: Blood pressure is well-controlled on current meds.  7-13-25: Blood pressures remain well-controlled    7-14-25: BP's remain well controlled.  7-15-25: Blood pressures remain well-controlled.     5.  Dyslipidemia  Continue statin therapy     6.  Acute cholecystitis  Patient was seen by general surgery who recommended cholecystostomy tube and antibiotic therapy followed by outpatient surgery in 4 to 6 weeks.    Patient declined undergoing cholecystostomy tube.    Management per general surgery/hospitalist service  7-12-25: Patient eating normal diet with no significant GI symptoms although he still has mild right upper quadrant tenderness.  Patient is scheduled as an outpatient for cholecystectomy on 22 July.  Again we will get the stress test on Monday for further restratification.  Recommendations--patient back in sinus rhythm.  Continue heparin infusion until we know the results of the stress test on Monday.  He  does have change in his overall EF to 40 to 45% compared to echo in 2021.  No overt CHF.  Blood pressures are controlled.  Patient with some hypokalemia and replacement has been ordered via IV and orally .  Repeat labs in AM.  7-14-25:  OP surgery 7-22-25 if no further cardiac issues otherwise if he requires intervention this will need delayed. Patient verbalizes understanding.  7-15-25: No GI complaints.  If patient does have intervention this will delay cholecystectomy.    Recommendations--left heart catheterization with grafts today.  Case reviewed with Dr. Mendiola.    Addendum--was notified by patient's RN that yesterday she was passing manage the patient went back into atrial fibrillation and currently telemetry shows atrial fibrillation with heart rates 110's.        Eze Langston PA-C  7/15/2025  8:52 AM               [1]   Scheduled medications   Medication Dose Route Frequency    amLODIPine  10 mg oral Daily    aspirin  81 mg oral Daily    atorvastatin  80 mg oral Nightly    chlorthalidone  25 mg oral Daily    cholecalciferol  125 mcg oral Daily    ezetimibe  10 mg oral Daily    metoprolol succinate XL  25 mg oral BID    pantoprazole  40 mg oral Daily before breakfast    Or    pantoprazole  40 mg intravenous Daily before breakfast    perflutren lipid microspheres  0.5-10 mL of dilution intravenous Once in imaging    perflutren protein A microsphere  0.5 mL intravenous Once in imaging    piperacillin-tazobactam  3.375 g intravenous q6h    polyethylene glycol  17 g oral Daily    sulfur hexafluoride microsphr  2 mL intravenous Once in imaging

## 2025-07-15 NOTE — PRE-SEDATION DOCUMENTATION
"Cardiology Preprocedure Note    Neftali Schmitt   Indication for procedure: The primary encounter diagnosis was Cholecystitis. Diagnoses of Shortness of breath and Coronary artery disease involving native coronary artery of native heart with other form of angina pectoris were also pertinent to this visit. Patient here for [facility] w/ grafts r/o obstructive CAD         /76   Pulse 68   Temp 36.8 °C (98.2 °F) (Temporal)   Resp 18   Ht 1.676 m (5' 6\")   Wt 88 kg (194 lb 0.1 oz)   SpO2 100%   BMI 31.31 kg/m²    Relevant Labs:   Lab Results   Component Value Date    CREATININE 1.07 07/15/2025    EGFR 69 07/15/2025    INR 1.1 07/11/2025    PROTIME 12.6 (H) 07/11/2025       Planned Sedation/Anesthesia: Moderate    Airway assessment: normal    Directed physical examination: General: Alert and Oriented, No distress, cooperative. Lungs: Clear to auscultation bilaterally, no wheezes, rhonci, or rales. respirations unlabored Heart: regular rate and rhythm, S1 and S2, no murmur, pulses palpable     Mallampati: II (hard and soft palate, upper portion of tonsils and uvula visible)    ASA Score: ASA 3 - Patient with moderate systemic disease with functional limitations    Benefits, risks and alternatives of procedure and planned sedation have been discussed with the patient and/or their representative. All questions answered and they agree to proceed.     Rand Brownlee, APRN-CNP   "

## 2025-07-15 NOTE — POST-PROCEDURE NOTE
Physician Transition of Care Summary  Invasive Cardiovascular Lab    Procedure Date: 7/15/2025  Attending:    Megan Mendiola - Primary  Resident/Fellow/Other Assistant: Surgeons and Role:  * No surgeons found with a matching role *    Indications:   Pre-op Diagnosis      * Coronary artery disease involving native coronary artery of native heart with other form of angina pectoris [I25.118]    Post-procedure diagnosis:   Post-op Diagnosis     * Coronary artery disease involving native coronary artery of native heart with other form of angina pectoris [I25.118]    Procedure(s):   [facility] No LV With Grafts  [address] - AL CATH PLMT & NJX CORONARY ART/GRFT ANGIO IMG S&I        Procedure Findings:       Severe triple native vessel disease (Proximal LAD and RCA , ostial LCX ). Patent LIMA-LAD and SVG-rPDA. Occluded SVG-OM with competitive from L-L and R-L collaterals.     Description of the Procedure:   RFA 6F- Angioseal    Complications:   none    Stents/Implants:   Implants       No implant documentation for this case.            Anticoagulation/Antiplatelet Plan:   ASA    Estimated Blood Loss:   10 mL    Anesthesia: Moderate Sedation Anesthesia Staff: No anesthesia staff entered.    Any Specimen(s) Removed:   No specimens collected during this procedure.    Disposition:     -Optimize medical therapy  -Follow-up with cardiology outpatient, if he develops symptoms will plan on LCX  PCI  -Monitor right fem artrial access site per protocol   -Return to floor, rest of care per medicine team       Electronically signed by: Sridhar Coffey MD, 7/15/2025 2:48 PM

## 2025-07-15 NOTE — CARE PLAN
The patient's goals for the shift include adequate pain control    The clinical goals for the shift include Pt will maintain hemodynamic stability throughout the shift.    Over the shift, the patient did make progress toward the following goals. Barriers to progression include understanding. Recommendations to address these barriers include education.

## 2025-07-15 NOTE — NURSING NOTE
End of Shift Report  7/15/25     Admission  Neftali Schmitt was admitted on 7/10/2025 for the following diagnoses:   Shortness of breath [R06.02]  Cholecystitis [K81.9]    Significant Events  There were no significant events    Interventions      Response to Interventions       Recent Vital Signs  Patient Vitals for the past 12 hrs:   BP Temp Temp src Pulse Resp SpO2   07/15/25 0751 128/75 36.6 °C (97.8 °F) Temporal 72 18 95 %   07/15/25 0800 119/70 36.6 °C (97.9 °F) Temporal 56 18 94 %   07/15/25 1111 105/72 36.4 °C (97.6 °F) Temporal 92 18 96 %   07/15/25 1315 158/76 36.8 °C (98.2 °F) Temporal 68 18 100 %   07/15/25 1410 (!) 187/84 -- -- 60 16 99 %   07/15/25 1411 -- -- -- -- -- 98 %   07/15/25 1438 127/77 -- -- 70 16 97 %      0-10 (Numeric) Pain Score: 0 - No pain     Latest labs  Lab Results   Component Value Date    WBC 8.0 07/14/2025    HGB 13.4 (L) 07/14/2025    HCT 37.9 (L) 07/14/2025     07/14/2025    CHOL 139 03/18/2025    TRIG 137 03/18/2025    HDL 49 03/18/2025    ALT 24 07/11/2025    AST 21 07/11/2025     (L) 07/15/2025    K 3.2 (L) 07/15/2025     07/15/2025    CREATININE 1.07 07/15/2025    BUN 11 07/15/2025    CO2 26 07/15/2025    TSH 3.24 03/18/2025    PSA 0.70 03/18/2025    INR 1.1 07/11/2025    HGBA1C 6.2 (H) 03/18/2025       Other Results      Scheduled Medications:  Scheduled Medications[1]   Continuous Medications[2]         [1] amLODIPine, 10 mg, oral, Daily  apixaban, 5 mg, oral, BID  aspirin, 81 mg, oral, Daily  atorvastatin, 80 mg, oral, Nightly  chlorthalidone, 25 mg, oral, Daily  cholecalciferol, 125 mcg, oral, Daily  ezetimibe, 10 mg, oral, Daily  metoprolol succinate XL, 25 mg, oral, BID  pantoprazole, 40 mg, oral, Daily before breakfast   Or  pantoprazole, 40 mg, intravenous, Daily before breakfast  perflutren lipid microspheres, 0.5-10 mL of dilution, intravenous, Once in imaging  perflutren protein A microsphere, 0.5 mL, intravenous, Once in  imaging  piperacillin-tazobactam, 3.375 g, intravenous, q6h  polyethylene glycol, 17 g, oral, Daily  sulfur hexafluoride microsphr, 2 mL, intravenous, Once in imaging  [2] sodium chloride 0.9%, 75 mL/hr

## 2025-07-15 NOTE — PROGRESS NOTES
"    GENERAL SURGERY / TRAUMA PROGRESS NOTE    Patient: Neftali Schmitt  Room: 2030/2030-A    Age: 80 y.o.   Gender: male  Attending: Eze Baez MD    MRN: [id number]  Admission Date: 7/10/2025    PCP: Kiran Beard MD       Neftali Schmitt is a 80 y.o. male on day 5  of admission presenting with Calculus of gallbladder with acute cholecystitis without obstruction.    SUBJECTIVE   Interval History:  Denies any abdominal pain.  When eating, he is not having any abdominal pain, nausea or vomiting.  Still on heparin drip.  Awaiting completion of cardiac evaluation.    ROS  Review of Systems   Constitutional:  Negative for chills, fatigue and fever.   HENT:  Positive for nosebleeds. Negative for congestion, ear pain and hearing loss.    Eyes:  Negative for pain and redness.   Respiratory:  Negative for cough and shortness of breath.    Cardiovascular:  Negative for chest pain and leg swelling.   Gastrointestinal:  Negative for abdominal pain, constipation, diarrhea, nausea and vomiting.   Endocrine: Negative for polyphagia.   Genitourinary:  Negative for dysuria, flank pain, frequency and hematuria.   Musculoskeletal:  Positive for arthralgias and myalgias.   Skin:  Negative for rash and wound.   Allergic/Immunologic: Negative for immunocompromised state.   Neurological:  Negative for speech difficulty, weakness and headaches.   Hematological:  Does not bruise/bleed easily.   Psychiatric/Behavioral:  Negative for agitation and confusion.           OBJECTIVE   Last Recorded Vitals  Blood pressure 128/75, pulse 72, temperature 36.6 °C (97.8 °F), temperature source Temporal, resp. rate 18, height 1.676 m (5' 6\"), weight 88 kg (194 lb 0.1 oz), SpO2 95%.    Intake/Output last 3 Shifts:  I/O last 3 completed shifts:  In: 890 (10.1 mL/kg) [P.O.:240; IV Piggyback:650]  Out: - (0 mL/kg)   Weight: 88 kg     PHYSICAL EXAM  Physical Exam   General: Well-developed, well-nourished and in no acute distress.  Head: " Normocephalic. Atraumatic. No further bleeding noted from nares  Neck/thyroid: Neck is supple.   Eyes: EOMI, anicteric sclera   ENMT: No masses or deformity of external nose. External ears without masses.  Respiratory/Chest:  Normal respiratory effort.  Cardiovascular: Regular rate.  Abdomen: Soft, nontender, nondistended.  No right upper quadrant tenderness.  Musculoskeletal: Normal extremities with intact ROM; no edema or cyanosis  Neuro: A&O x 4, no focal neuro deficit appreciated.   Psych: Normal mood and affect.    RESULTS   Labs  Results for orders placed or performed during the hospital encounter of 07/10/25 (from the past 24 hours)   Heparin Assay, UFH   Result Value Ref Range    Heparin Unfractionated 0.5 See Comment Below for Therapeutic Ranges IU/mL   CBC   Result Value Ref Range    WBC 8.0 4.4 - 11.3 x10*3/uL    nRBC 0.0 0.0 - 0.0 /100 WBCs    RBC 4.20 (L) 4.50 - 5.90 x10*6/uL    Hemoglobin 13.4 (L) 13.5 - 17.5 g/dL    Hematocrit 37.9 (L) 41.0 - 52.0 %    MCV 90 80 - 100 fL    MCH 31.9 26.0 - 34.0 pg    MCHC 35.4 32.0 - 36.0 g/dL    RDW 13.4 11.5 - 14.5 %    Platelets 250 150 - 450 x10*3/uL   Heparin Assay, UFH   Result Value Ref Range    Heparin Unfractionated 0.5 See Comment Below for Therapeutic Ranges IU/mL   Basic Metabolic Panel   Result Value Ref Range    Glucose 109 (H) 74 - 99 mg/dL    Sodium 135 (L) 136 - 145 mmol/L    Potassium 3.2 (L) 3.5 - 5.3 mmol/L    Chloride 101 98 - 107 mmol/L    Bicarbonate 26 21 - 32 mmol/L    Anion Gap 11 10 - 20 mmol/L    Urea Nitrogen 11 6 - 23 mg/dL    Creatinine 1.07 0.50 - 1.30 mg/dL    eGFR 69 >60 mL/min/1.73m*2    Calcium 9.1 8.6 - 10.3 mg/dL   Magnesium   Result Value Ref Range    Magnesium 1.95 1.60 - 2.40 mg/dL   Heparin Assay, UFH   Result Value Ref Range    Heparin Unfractionated 0.6 See Comment Below for Therapeutic Ranges IU/mL       Radiology Resutls  Imaging  Nuclear Stress Test  Result Date: 7/14/2025  Medium-sized area of reversible perfusion  defect, consistent with ischemia in left circumflex territory. Mild left ventricular systolic dysfunction on post stress gated imaging. 1. Pertinent findings if any on low-dose non gated CT images-severe coronary artery calcification. Calcification of the thoracic aorta. Small left-sided pleural effusion.       Signed by: Nestor Humphrey 7/14/2025 5:44 PM Dictation workstation:   [id number]        Cardiology, Vascular, and Other Imaging  No other imaging results found for the past 2 days         ASSESSMENT / PLAN     Assessment & Plan  Calculus of gallbladder with acute cholecystitis without obstruction         PLAN    Recommended treating the infection for the acute cholecystitis before proceeding with surgical intervention given his advanced age and his underlying cardiac issues.  He is responding well to the antibiotics and has had a normalization of his WBC.  He is refusing a cholecystostomy tube.  Seems to be responding well to the IV antibiotics.  When able to tolerate p.o., can be switched to oral antibiotics to complete a 10-day course.  Explained the treatment plan with treating of the infection first and plan for outpatient surgery in the next 4 to 6 weeks.  He may follow-up in my office on 7/22/2025 to discuss timing of the cholecystectomy.   Recommended staying on a low grease low-fat diet when able to eat.  Awaiting completion of cardiac evaluation.  Patient still on IV heparin drip this morning.        Fela Mata MD, FACS  [facility] General Surgery  [address] [place] [address];   [employer] [address]; [address]  [place]  [address] [phone]

## 2025-07-16 ENCOUNTER — PHARMACY VISIT (OUTPATIENT)
Dept: PHARMACY | Facility: CLINIC | Age: 80
End: 2025-07-16
Payer: COMMERCIAL

## 2025-07-16 VITALS
BODY MASS INDEX: 31.18 KG/M2 | DIASTOLIC BLOOD PRESSURE: 62 MMHG | TEMPERATURE: 97.1 F | HEIGHT: 66 IN | WEIGHT: 194 LBS | RESPIRATION RATE: 18 BRPM | HEART RATE: 60 BPM | SYSTOLIC BLOOD PRESSURE: 127 MMHG | OXYGEN SATURATION: 95 %

## 2025-07-16 LAB
ALBUMIN SERPL BCP-MCNC: 3.7 G/DL (ref 3.4–5)
ANION GAP SERPL CALC-SCNC: 11 MMOL/L (ref 10–20)
BUN SERPL-MCNC: 14 MG/DL (ref 6–23)
CALCIUM SERPL-MCNC: 8.4 MG/DL (ref 8.6–10.3)
CHLORIDE SERPL-SCNC: 104 MMOL/L (ref 98–107)
CO2 SERPL-SCNC: 24 MMOL/L (ref 21–32)
CREAT SERPL-MCNC: 1.03 MG/DL (ref 0.5–1.3)
EGFRCR SERPLBLD CKD-EPI 2021: 73 ML/MIN/1.73M*2
ERYTHROCYTE [DISTWIDTH] IN BLOOD BY AUTOMATED COUNT: 13.7 % (ref 11.5–14.5)
GLUCOSE SERPL-MCNC: 117 MG/DL (ref 74–99)
HCT VFR BLD AUTO: 39.5 % (ref 41–52)
HGB BLD-MCNC: 13.2 G/DL (ref 13.5–17.5)
MAGNESIUM SERPL-MCNC: 1.8 MG/DL (ref 1.6–2.4)
MCH RBC QN AUTO: 31.1 PG (ref 26–34)
MCHC RBC AUTO-ENTMCNC: 33.4 G/DL (ref 32–36)
MCV RBC AUTO: 93 FL (ref 80–100)
NRBC BLD-RTO: 0 /100 WBCS (ref 0–0)
PHOSPHATE SERPL-MCNC: 4.1 MG/DL (ref 2.5–4.9)
PLATELET # BLD AUTO: 257 X10*3/UL (ref 150–450)
POTASSIUM SERPL-SCNC: 3.6 MMOL/L (ref 3.5–5.3)
RBC # BLD AUTO: 4.24 X10*6/UL (ref 4.5–5.9)
SODIUM SERPL-SCNC: 135 MMOL/L (ref 136–145)
WBC # BLD AUTO: 8.2 X10*3/UL (ref 4.4–11.3)

## 2025-07-16 PROCEDURE — 2500000001 HC RX 250 WO HCPCS SELF ADMINISTERED DRUGS (ALT 637 FOR MEDICARE OP): Performed by: NURSE PRACTITIONER

## 2025-07-16 PROCEDURE — 99233 SBSQ HOSP IP/OBS HIGH 50: CPT | Performed by: PHYSICIAN ASSISTANT

## 2025-07-16 PROCEDURE — RXMED WILLOW AMBULATORY MEDICATION CHARGE

## 2025-07-16 PROCEDURE — 83735 ASSAY OF MAGNESIUM: CPT | Performed by: NURSE PRACTITIONER

## 2025-07-16 PROCEDURE — 2500000004 HC RX 250 GENERAL PHARMACY W/ HCPCS (ALT 636 FOR OP/ED): Performed by: NURSE PRACTITIONER

## 2025-07-16 PROCEDURE — 99239 HOSP IP/OBS DSCHRG MGMT >30: CPT | Performed by: FAMILY MEDICINE

## 2025-07-16 PROCEDURE — 85027 COMPLETE CBC AUTOMATED: CPT | Performed by: FAMILY MEDICINE

## 2025-07-16 PROCEDURE — 2500000002 HC RX 250 W HCPCS SELF ADMINISTERED DRUGS (ALT 637 FOR MEDICARE OP, ALT 636 FOR OP/ED): Performed by: PHYSICIAN ASSISTANT

## 2025-07-16 PROCEDURE — 37799 UNLISTED PX VASCULAR SURGERY: CPT | Performed by: NURSE PRACTITIONER

## 2025-07-16 PROCEDURE — 99232 SBSQ HOSP IP/OBS MODERATE 35: CPT | Performed by: SURGERY

## 2025-07-16 PROCEDURE — 2500000002 HC RX 250 W HCPCS SELF ADMINISTERED DRUGS (ALT 637 FOR MEDICARE OP, ALT 636 FOR OP/ED): Performed by: NURSE PRACTITIONER

## 2025-07-16 PROCEDURE — 80069 RENAL FUNCTION PANEL: CPT | Performed by: NURSE PRACTITIONER

## 2025-07-16 PROCEDURE — 2500000001 HC RX 250 WO HCPCS SELF ADMINISTERED DRUGS (ALT 637 FOR MEDICARE OP): Performed by: PHYSICIAN ASSISTANT

## 2025-07-16 RX ORDER — POTASSIUM CHLORIDE 20 MEQ/1
20 TABLET, EXTENDED RELEASE ORAL DAILY
Status: DISCONTINUED | OUTPATIENT
Start: 2025-07-16 | End: 2025-07-16 | Stop reason: HOSPADM

## 2025-07-16 RX ORDER — POTASSIUM CHLORIDE 20 MEQ/1
20 TABLET, EXTENDED RELEASE ORAL DAILY
Qty: 30 TABLET | Refills: 2 | Status: SHIPPED | OUTPATIENT
Start: 2025-07-16 | End: 2025-10-14

## 2025-07-16 RX ADMIN — PIPERACILLIN SODIUM AND TAZOBACTAM SODIUM 3.38 G: 3; .375 INJECTION, SOLUTION INTRAVENOUS at 03:24

## 2025-07-16 RX ADMIN — PANTOPRAZOLE SODIUM 40 MG: 40 TABLET, DELAYED RELEASE ORAL at 06:17

## 2025-07-16 RX ADMIN — EZETIMIBE 10 MG: 10 TABLET ORAL at 09:09

## 2025-07-16 RX ADMIN — CHOLECALCIFEROL TAB 125 MCG (5000 UNIT) 125 MCG: 125 TAB at 09:09

## 2025-07-16 RX ADMIN — AMLODIPINE BESYLATE 10 MG: 10 TABLET ORAL at 09:09

## 2025-07-16 RX ADMIN — CHLORTHALIDONE 25 MG: 25 TABLET ORAL at 09:09

## 2025-07-16 RX ADMIN — POTASSIUM CHLORIDE 20 MEQ: 20 TABLET, EXTENDED RELEASE ORAL at 09:16

## 2025-07-16 RX ADMIN — ASPIRIN 81 MG: 81 TABLET, DELAYED RELEASE ORAL at 09:09

## 2025-07-16 RX ADMIN — PIPERACILLIN SODIUM AND TAZOBACTAM SODIUM 3.38 G: 3; .375 INJECTION, SOLUTION INTRAVENOUS at 09:09

## 2025-07-16 RX ADMIN — METOPROLOL SUCCINATE 25 MG: 25 TABLET, EXTENDED RELEASE ORAL at 09:09

## 2025-07-16 RX ADMIN — APIXABAN 5 MG: 5 TABLET, FILM COATED ORAL at 09:09

## 2025-07-16 ASSESSMENT — ENCOUNTER SYMPTOMS
DIARRHEA: 0
WHEEZING: 0
PALPITATIONS: 0
CONFUSION: 0
HEADACHES: 0
WEAKNESS: 0
MYALGIAS: 1
BRUISES/BLEEDS EASILY: 0
EYE REDNESS: 0
CHILLS: 0
ORTHOPNEA: 0
WOUND: 0
FLANK PAIN: 0
POLYPHAGIA: 0
AGITATION: 0
CONSTIPATION: 0
DYSURIA: 0
COUGH: 0
HEMATURIA: 0
ARTHRALGIAS: 1
VOMITING: 0
SHORTNESS OF BREATH: 0
SPEECH DIFFICULTY: 0
FREQUENCY: 0
EYE PAIN: 0
NAUSEA: 0
FATIGUE: 0
FEVER: 0
ABDOMINAL PAIN: 0

## 2025-07-16 ASSESSMENT — PAIN SCALES - GENERAL: PAINLEVEL_OUTOF10: 0 - NO PAIN

## 2025-07-16 ASSESSMENT — PAIN - FUNCTIONAL ASSESSMENT: PAIN_FUNCTIONAL_ASSESSMENT: 0-10

## 2025-07-16 NOTE — PROGRESS NOTES
"    GENERAL SURGERY / TRAUMA PROGRESS NOTE    Patient: Neftali Schmitt  Room: 2030/2030-A    Age: 80 y.o.   Gender: male  Attending: Eze Baez MD    MRN: [id number]  Admission Date: 7/10/2025    PCP: Kiran Beard MD       Neftali Schmitt is a 80 y.o. male on day 6  of admission presenting with Calculus of gallbladder with acute cholecystitis without obstruction.    SUBJECTIVE   Interval History:  NAOE. Patient underwent heart cath yesterday without intervention. Resting comfortably when seen and reports very mild RUQ pain to palpation but not other symptoms. Pain he presented with has not returned and he has tolerated diet, passed flatus and had regular bowel movements. Ambulating well. Denies fever, chills, chest pain, SOB, and nausea.     ROS  Review of Systems   Constitutional:  Negative for chills, fatigue and fever.   HENT:  Negative for congestion, ear pain, hearing loss and nosebleeds.    Eyes:  Negative for pain and redness.   Respiratory:  Negative for cough and shortness of breath.    Cardiovascular:  Negative for chest pain and leg swelling.   Gastrointestinal:  Negative for abdominal pain, constipation, diarrhea, nausea and vomiting.   Endocrine: Negative for polyphagia.   Genitourinary:  Negative for dysuria, flank pain, frequency and hematuria.   Musculoskeletal:  Positive for arthralgias and myalgias.   Skin:  Negative for rash and wound.   Allergic/Immunologic: Negative for immunocompromised state.   Neurological:  Negative for speech difficulty, weakness and headaches.   Hematological:  Does not bruise/bleed easily.   Psychiatric/Behavioral:  Negative for agitation and confusion.           OBJECTIVE   Last Recorded Vitals  Blood pressure 127/73, pulse 60, temperature 36.3 °C (97.4 °F), temperature source Temporal, resp. rate 18, height 1.676 m (5' 6\"), weight 88 kg (194 lb 0.1 oz), SpO2 98%.    Intake/Output last 3 Shifts:  I/O last 3 completed shifts:  In: 897.5 (10.2 mL/kg) " [I.V.:197.5 (2.2 mL/kg); IV Piggyback:700]  Out: 10 (0.1 mL/kg) [Blood:10]  Weight: 88 kg     PHYSICAL EXAM  Physical Exam   General: Well-developed, well-nourished and in no acute distress.  Head: Normocephalic. Atraumatic. No further bleeding noted from nares  Neck/thyroid: Neck is supple.   Eyes: EOMI, anicteric sclera   ENMT: No masses or deformity of external nose. External ears without masses.  Respiratory/Chest:  Normal respiratory effort.  Cardiovascular: Regular rate.  Abdomen: Soft, nontender, nondistended.  Minimal right upper quadrant tenderness without peritoneal signs - no lara sign. Right groin access site for heart cath with dressing in place without strikethrough or hematoma.   Musculoskeletal: Normal extremities with intact ROM; no edema or cyanosis  Neuro: A&O x 4, no focal neuro deficit appreciated.   Psych: Normal mood and affect.    RESULTS   Labs  Results for orders placed or performed during the hospital encounter of 07/10/25 (from the past 24 hours)   Magnesium   Result Value Ref Range    Magnesium 1.80 1.60 - 2.40 mg/dL   Renal Function Panel   Result Value Ref Range    Glucose 117 (H) 74 - 99 mg/dL    Sodium 135 (L) 136 - 145 mmol/L    Potassium 3.6 3.5 - 5.3 mmol/L    Chloride 104 98 - 107 mmol/L    Bicarbonate 24 21 - 32 mmol/L    Anion Gap 11 10 - 20 mmol/L    Urea Nitrogen 14 6 - 23 mg/dL    Creatinine 1.03 0.50 - 1.30 mg/dL    eGFR 73 >60 mL/min/1.73m*2    Calcium 8.4 (L) 8.6 - 10.3 mg/dL    Phosphorus 4.1 2.5 - 4.9 mg/dL    Albumin 3.7 3.4 - 5.0 g/dL   CBC   Result Value Ref Range    WBC 8.2 4.4 - 11.3 x10*3/uL    nRBC 0.0 0.0 - 0.0 /100 WBCs    RBC 4.24 (L) 4.50 - 5.90 x10*6/uL    Hemoglobin 13.2 (L) 13.5 - 17.5 g/dL    Hematocrit 39.5 (L) 41.0 - 52.0 %    MCV 93 80 - 100 fL    MCH 31.1 26.0 - 34.0 pg    MCHC 33.4 32.0 - 36.0 g/dL    RDW 13.7 11.5 - 14.5 %    Platelets 257 150 - 450 x10*3/uL       Radiology Resutls  Imaging  Nuclear Stress Test  Result Date:  7/14/2025  Medium-sized area of reversible perfusion defect, consistent with ischemia in left circumflex territory. Mild left ventricular systolic dysfunction on post stress gated imaging. 1. Pertinent findings if any on low-dose non gated CT images-severe coronary artery calcification. Calcification of the thoracic aorta. Small left-sided pleural effusion.       Signed by: Nestor Humphrey 7/14/2025 5:44 PM Dictation workstation:   [id number]        Cardiology, Vascular, and Other Imaging  Cardiac Catheterization Procedure  Result Date: 7/15/2025       [facility], Cath Lab [address] [place] [address]    Phone [phone] Fax [phone] Cardiovascular Catheterization Report Patient Name:      ANEUDY MCCORMICK   Performing Physician:  [id number] Mendiola MD Study Date:        7/15/2025            Verifying Physician:   Gisel Mendiola MD MRN/PID:           [id number]             Cardiologist/Co-Scrub: Accession#:        [id number]         Ordering Provider:     [id number] KATHY BRUNNER Date of Birth/Age: 1945 / 80 years Cardiologist: Gender:            M                    Fellow:                [id number] Sridhar Coffey MD Encounter#:        [id number]           Surgeon:  Study: Left Heart Cath with Grafts  Indications: ANEUDY MCCORMICK is a 80 year old male who presents with coronary artery disease, dyslipidemia, prior coronary artery bypass graft surgery, hypertension and an anginal equivalent chest pain assessment (i.e. dyspnea on exertion believed to be from ischemia). Cardiomyopathy.  Procedure Description: After infiltration with 1% Lidocaine, the right femoral artery was cannulated with a modified Seldinger technique.  Subsequently a 6 Nepali sheath was placed retrograde in the right femoral artery. Selective coronary catheterization was performed using a 6 Fr catheter(s) exchanged over a guide wire to cannulate the coronary arteries. A JL 4 tip catheter was used for left coronary injections. A JR 4 tip catheter was used for right coronary injections. Multiple injections of contrast were made into the left and right coronary arteries with angiograms recorded in multiple projections. After completion of the procedure, femoral artery angiography was performed. This demonstrated a common femoral artery puncture appropriate for closure. An Angio-Seal Evolution 6F ([employer]) vascular closure device was placed per protocol.  Left Main Coronary Artery: The left main coronary artery is a normal caliber vessel. The left main arises normally from the left coronary sinus of Valsalva and bifurcates into the LAD and circumflex coronary arteries. The left main coronary artery showed no significant disease or stenosis greater than 30%.  Left Anterior Descending Coronary Artery Distribution: The left anterior descending coronary artery is a normal caliber vessel. The LAD arises normally from the left main coronary artery. The mid left anterior descending coronary artery showed 100% stenosis.  Circumflex Coronary Artery Distribution: The circumflex coronary artery is a normal caliber vessel. The circumflex arises normally from the left main coronary artery and terminates in the AV groove. The ostial circumflex coronary artery showed 100% stenosis.  Right Coronary Artery Distribution: The right coronary artery is a normal caliber vessel. The RCA arises normally from the right sinus of Valsalva. The proximal right coronary artery showed 100% stenosis.  Coronary Grafts:  LIMA Graft: Left internal mammary artery graft conduit, originating in situ and attached to the mid left anterior descending, is patent. The left internal mammary artery  graft conduit originating in situ and attached to the mid left anterior descending is patent with less than 10% stenosis along its entire length. Saphaneous Vein Graft(s): The saphenous vein graft, originating from the aorta and attached to the right posterior descending artery, is patent. The saphenous vein graft, originating from the aorta and attached to the right posterior descending artery is patent with less than 10% stenosis along its entire length. The 2nd saphenous vein graft, originating from the aorta and attached to the 1st obtuse marginal, appeared totally occluded. The 2nd saphenous vein graft, originating from the aorta and attached to the 1st obtuse marginal appeared totally occluded with 100% stenosis at the ostium. Sequential Graft: The saphenous vein graft, originating from the aorta and attached to the right posterior descending artery, is patent.  Coronary Lesion Summary: Vessel       Stenosis    Vessel Segment LAD        100% stenosis      mid Circumflex 100% stenosis     ostial RCA        100% stenosis    proximal  Graft Stenosis Summary: Graft     Destination of Graft          % Stenosis        Location of Stenosis LIMA  mid left anterior descending less than 10%                                    stenosis              along its entire length SVG 1 right posterior descending   less than 10%       artery                       stenosis              along its entire length SVG 2 1st obtuse marginal          100% stenosis         at the ostium  Hemo Personnel: +---------------+---------+ Name           Duty      +---------------+---------+ Senthil Mendiola MD 1 +---------------+---------+  Hemodynamic Pressures:  +----+-------------------+---------+------------+-------------+------+---------+ Site     Date Time       Phase    Systolic    Diastolic    ED  Mean mmHg                          Name       mmHg        mmHg      mmHg            +----+-------------------+---------+------------+-------------+------+---------+   AO  7/15/2025 2:25:32 AIR REST         140           60             92                      PM                                                  +----+-------------------+---------+------------+-------------+------+---------+   LV  7/15/2025 2:29:09 AIR REST         149            1    13                               PM                                                  +----+-------------------+---------+------------+-------------+------+---------+   LV  7/15/2025 2:29:18 AIR REST         150            0    13                               PM                                                  +----+-------------------+---------+------------+-------------+------+---------+  LVp  7/15/2025 2:29:28 AIR REST         150           -3    11                               PM                                                  +----+-------------------+---------+------------+-------------+------+---------+  AOp  7/15/2025 2:29:36 AIR REST         146           58             95                      PM                                                  +----+-------------------+---------+------------+-------------+------+---------+   AO  7/15/2025 2:29:57 AIR REST         150           67             98                      PM                                                  +----+-------------------+---------+------------+-------------+------+---------+  Oxygen Saturation %: +-----------+------------+ Sample SiteHB (g/100ml) +-----------+------------+     SYS ART        13.4 +-----------+------------+     SYS DERIK        13.4 +-----------+------------+     PUL ART        13.4 +-----------+------------+     PUL DERIK        13.4 +-----------+------------+  Complications: No in-lab complications observed.  Cardiac Cath Post Procedure Notes: Post  Procedure Diagnosis: Triple vessel disease. Blood Loss:               Estimated blood loss during the procedure was 5 mls. Specimens Removed:        Number of specimen(s) removed: none.  Recommendations: Maximize medical therapy. Agressive risk factor modification efforts. Follow-up with cardiology clinic. ____________________________________________________________________________________ CONCLUSIONS:  1. Severe triple native vessel disease (Proximal LAD and RCA , ostial LCX ). Patent LIMA-LAD and SVG-rPDA.  2. Occluded SVG-OM with competitive from L-L and R-L collaterals.  3. Optimize medical therapy, if he develops symptoms will plan on LCX  PCI. ICD 10 Codes: Cardiomyopathy, unspecified-I42.9  CPT Codes: Left Heart Cath Bypass Graft w ventriculography and coronary angio(C)-11455; Moderate Sedation Services initial 15 minutes patient >5 years-24013; Moderate Sedation Services 1st additional 15 minutes patient >5 years-57340  [id number] Senthil Mendiola MD Performing Physician Electronically signed by [id number] Senthil Mendiola MD on 7/15/2025 at 9:35:54 PM  ** Final **     Cardiology Interpretation Of Nuclear Stress - See Other Report For Nuclear Portion  Result Date: 7/15/2025              [place] [id number]      Phone [phone] Fax [phone] Nuclear Pharmacologic Stress Test Patient Name:      ANEUDY MCCORMICK   Ordering Provider:     [id number] KRISTA MCCLAIN Study Date:        7/14/2025            Reading Physician:     Deisy Humphrey MD MRN/PID:           [id number]             Supervising Physician: Deisy Humphrey MD Accession#:        [id number]         Fellow: Date of Birth/Age: 1945 / 80 years Fellow: Gender:            M                    Nurse:                  Cecile Frey Admission Status:  Outpatient           Sonographer:           JARVIS Height:            168.00 cm            Technologist: Weight:            90.30 kg             Additional Staff: BSA:               2.00 m2              Encounter#:            [id number] BMI:               31.99 kg/m2          Patient Location:      [facility] Study Type:    CARDIOLOGY INTERPRETATION OF NUCLEAR STRESS Diagnosis/ICD: Atherosclerotic heart disease of native coronary artery with                other forms of angina pectoris-I25.118 Indication:    CAD Falls Risk: Low: Patient has low risk for sustaining a fall; environmental safety interventions in place.  Study Details: Correct procedure and correct patient verified verbally and with                ID Band checked.  Patient History: Coronary artery disease, hypertension, peripheral vascular disease, vertigo, and dyslipidemia. Allergies: Losartan. BMI:       Obese >30.  Medications: Noravasc, ASA, lipitor, hygroton, vit D, zetia, antivert, toprol XL, protonix, zosyn.  Patient Performance: Patient received a total of 0.4 mg of Regadenoson at 8:53:59 AM. The peak heart rate achieved was 93 bpm, which was 67 % of the age predicted target heart rate of 138 bpm. The resting blood pressure was 155/76 mmHg with a heart rate of 60 bpm. The patient developed no symptoms during the stress exam. The blood pressure response was normal. The test was terminated due to: end of vasodilator infusion. Patient has met the discharge criteria and is discharged to their floor.  Baseline ECG: Resting ECG showed normal sinus rhythm with occasional premature ventricular contractions, nonspecific ST-T wave changes and prolonged QT.  Stress ECG: Stress ECG showed normal sinus rhythm, with occ PVC. There was a 1.0 upsloping ST segment depression in leads III, AVF, V4 and V5 during the peak stress period.  Stress Stage Data: +----------------+--+------+-------+                 HRSys  BPDias BP +----------------+--+------+-------+ Baseline [id number]   76      +----------------+--+------+-------+ Stage I         38920   50      +----------------+--+------+-------+ Stage II        21043   53      +----------------+--+------+-------+ Stage III       07588   62      +----------------+--+------+-------+ Stage IV        22530   68      +----------------+--+------+-------+ Stage V         98235   70      +----------------+--+------+-------+  Summary:  1. Correlate with myocardial perfusion imaging results.  2. ECG changes consistent with ischemia.  3. No clinical evidence for ischemia at maximal infusion.  4. Nuclear image results are reported separately. [id number] Nestor Humphrey MD Electronically signed on 7/15/2025 at 12:06:53 PM   ** Final **            ASSESSMENT / PLAN     Assessment & Plan  Calculus of gallbladder with acute cholecystitis without obstruction    CAD (coronary artery disease)         PLAN    Recommended treating the infection for the acute cholecystitis before proceeding with surgical intervention given his advanced age and his underlying cardiac issues.  He is responding well to the antibiotics and has had a normalization of his WBC.  He is refusing a cholecystostomy tube.  Reviewed that he will have an increased risk of failure with just the oral antibiotics, but he reports that he has had multiple friends with these tubes and he does not want one.  Explained the treatment plan with treating of the infection first and plan for outpatient surgery in the next 4 to 6 weeks.  He may follow-up in my office on 7/22/2025 to discuss timing of the cholecystectomy.   Recommended staying on a low grease low-fat diet.  Patient with stress test and heart cath completed without need for PCI. Will plan for DOAC upon discharge per cardiology. Patient may need repeat heart cath with LCX PCI stent placed if he becomes symptomatic, however no plans to proceed with  PCI at this time. Okay to proceed with elective cholecystectomy in 4-6 weeks unless patient develops further cardiac symptoms in which case cholecystectomy will need to be postponed for 6 months.     Patient discussed with attending, Dr. Adolfo Bah MD  PGY 3 General Surgery       Fela Mata MD, FACS  [facility] General Surgery  [address] [place] [address];   [employer] [facility]; [address]  [place]  [address] [phone]

## 2025-07-16 NOTE — CARE PLAN
The patient's goals for the shift include staying informed    The clinical goals for the shift include this patient will remain in NSR throuhgout this shift    Over the shift, the patient did not make progress toward the following goals. Barriers to progression include understanding. Recommendations to address these barriers include education.

## 2025-07-16 NOTE — DISCHARGE SUMMARY
Discharge Diagnosis  Calculus of gallbladder with acute cholecystitis without obstruction, new onset paroxysmal atrial fibrillation, severe triple-vessel CAD with left circumflex     Issues Requiring Follow-Up  Cholecystitis, paroxysmal A-fib, severe triple-vessel CAD with left circumflex     This discharge took greater than 35 minutes.    Test Results Pending At Discharge  Pending Labs       No current pending labs.            Hospital Course    80 y.o. male with PMHx s/f GERD coronary artery disease status post CABG, borderline diabetes, carotid and vertebral artery disease, hypertension, dyslipidemia presented 7/10/25 with right upper quadrant pain.  Patient frequently has fairly severe GERD symptoms does typically run from his throat down to his epigastrium.  This pain and discomfort he started having that morning was much worse and located differently.  Starting more laterally in the right epigastrium into the middle epigastrium the patient developed sharp pain.  He did have some nausea.  No vomiting.  He denies any fevers but has had some chills for the last couple days.  The patient has not noticed any pain similar to this associated with any meals prior to this.  He is denying any chest pain or shortness of breath no diarrhea or urinary symptoms.  In the emergency department the initial CT scan was suggestive of acute cholecystitis the ultrasound was not suggestive of cholecystitis. The ED provider discussed the case with surgery HIDA scan was ordered.  Surgery advised to place the patient on antibiotic therapy for now and if needed patient might need to go for cholecystotomy tube and cholecystectomy at a later time. alk phos 68, ALT 20, AST 18, bilirubin 1.8.     7/11/2025: No acute events overnight. Vitals stable, afebrile. CBC/CMP reviewed, bili 1.9. HSTI 58>>49. No leukocytosis. HIDA abnormal with acute cholecystitis with obstructive cystic duct. Surgery ordered IR guided cholecystostomy tube  however patient declined.  General surgery planning antibiotic therapy only for now with delayed cholecystectomy given his advanced age and underlying cardiac issues also given the fact that he did respond well in regards to pain and normalization of his white blood cell count with antibiotics alone.  Patient advised that he does have an increased risk of failure with oral antibiotics alone, he verbalized understanding.  He has an office appointment arranged with Dr. Mata for 7/22/2025  HR noted to be 130's, ECG obtained revealed paroxysmal atrial fibrillation/flutter- new diagnosis, Dr. Stewart was in to see patient and gave IV metoprolol x1 and started on heparin drip, per his note he offered CARRI/cardioversion although patient stated he did not want to pursue at this time as he believes his elevated heart rate was related to being upset regarding the recommendation of a cholecystostomy tube. Continue with rate control only for now, can consider rhythm control if needed in the future      Addendum: Patient converted back to NSR with heart rate in the 60s.  He is again asking for discharge to home.  I discussed the case with his cardiologist Dr. Stewart who saw him this morning as patient is scheduled for a stress test however unfortunately he had caffeine this morning so cannot have the stress test inpatient until Monday as the we do not run these on the weekend.  Dr. Stewart advises that stress test should be completed prior to discharge rather than as an outpatient as patient with known CAD history, was having chest pain with arrhythmia, ST depression on ECG with arrhythmia, and mildly abnormal troponin on admission. Patient would need to leave AGAINST MEDICAL ADVICE if he is not willing to complete workup.      7/12/2025: No acute events overnight. Vitals stable, afebrile, HR 60's. CBC/BMP reviewed, K 2.7 with mag 1.64- both replaced by nocturnist. No leukocytosis. Echo with poorly visualized anatomical structures due  to suboptimal image quality. EF 40-45%. Stress test Monday  Rounded on patient on this date with RN      7/13: Pain well controlled. He is without acute complaint.      7/15:              Today the patient is seen following his [facility] awake alert and interactive appropriately in the presence of multiple family members.  Voices no specific complaints and no acute events overnight.  [facility] describes severe triple-vessel disease.  No interventions.  Recommending medical management and follow-up as outpatient with consideration for stent of the LCx  if becomes symptomatic.  This was explained to the patient and family.  Surgery at this point describing will need 10-day course of antibiotics.  Patient declined cholecystostomy tube.  Likely to follow-up with Dr. Mata on 7/22 and otherwise likely planning cholecystectomy follow-up in 4 to 6 weeks.    7/16:              Today patient is awake alert and interactive appropriately.  Voices no specific complaints and no acute events overnight.  States he is feeling very well and anxious for discharge home.  Cardiology recommending KCl 20 daily.  He will continue Eliquis which may be held 48 hours prior to planned cholecystectomy in 4 to 6 weeks.  He will continue on Augmentin at discharge and follow-up with Dr. Mata on 7/22.     Review of Systems   Constitutional:  Negative for appetite change, chills, diaphoresis, fatigue and fever.   HENT:  Negative for congestion, ear pain, facial swelling, hearing loss, nosebleeds, sore throat, tinnitus and trouble swallowing.    Eyes:  Negative for pain.   Respiratory:  Negative for cough, chest tightness, shortness of breath and wheezing.    Cardiovascular:  Negative for chest pain, palpitations and leg swelling.   Gastrointestinal:  Negative for abdominal pain, blood in stool, constipation, diarrhea, nausea and vomiting.   Genitourinary:  Negative for dysuria, flank pain, frequency, hematuria and urgency.   Musculoskeletal:  Negative  for back pain and joint swelling.   Skin:  Negative for rash and wound.   Neurological:  Negative for dizziness, syncope, weakness, light-headedness, numbness and headaches.   Hematological:  Does not bruise/bleed easily.   Psychiatric/Behavioral:  Negative for behavioral problems, hallucinations and suicidal ideas.      Acute cholecystitis  CT scan suggestive of acute cholecystitis, US undetermined, HIDA confirms dx  Surgery consulted  IV zosyn  Diet per general surgery service  As needed morphine and Zofran   Monitor fever/WBC curve  Total bili slightly elevated, direct bili WNL  Surgery ordered IR guided cholecystostomy tube however patient declined  General surgery planning antibiotic therapy only for now with delayed cholecystectomy given his advanced age and underlying cardiac issues also given the fact that he did respond well in regards to pain and normalization of his white blood cell count with antibiotics alone  Patient advised that he does have an increased risk of failure with oral antibiotics alone, he verbalized understanding  He has an office appointment arranged with Dr. Mata for 7/22/2025 to discuss scheduling gallbladder surgery     Paroxysmal atrial fibrillation/flutter-newly diagnosed  ECG showed atrial flutter with a heart rate of 137 bpm and ST segment depression in V2 through V6.   Heparin drip  IV beta-blocker x1  Increase PO metoprolol dose  Tele monitoring   Cardiology on board, offered CARRI but patient declined at this time- reevaluate possibility of rhythm control at a later time  Back to normal sinus rhythm 7/11/25  7/15: Once again A-fib  7/16: Will continue Eliquis which can be held 48 hours prior to planned cholecystectomy in 4 to 6 weeks.     Hypokalemia/Hypomagnesemia   Replete PRN     Coronary artery disease status post CABG   Mildly elevated troponin  Asa, statin, BB  Troponin leak thought secondary to demand ischemia patient does not appear to have active chest pain   ECG  nonischemic  Patient did not want to get an Echo  Stress test ordered-this will be done on Monday, 7/14/2025  7/15: [facility] today describes severe triple-vessel disease.  No interventions.  Describes medical management and follow-up outpatient.  Consideration for stent to the LCx  if symptomatic.     HTN  Continue home medications  Monitor BP  Adjust as needed      DLD  Statin as above  Monitor LFTs       Pertinent Physical Exam At Time of Discharge  Physical Exam  Constitutional:       General: He is not in acute distress.     Appearance: Normal appearance.      Comments: Seen in bedside chair  HENT:      Head: Normocephalic and atraumatic.      Right Ear: External ear normal.      Left Ear: External ear normal.      Nose: Nose normal.      Mouth/Throat:      Mouth: Mucous membranes are moist.      Pharynx: Oropharynx is clear.   Eyes:      Extraocular Movements: Extraocular movements intact.      Conjunctiva/sclera: Conjunctivae normal.      Pupils: Pupils are equal, round, and reactive to light.   Cardiovascular:      Rate and Rhythm: Rhythm irregular.      Pulses: Normal pulses.      Heart sounds: Normal heart sounds.   Pulmonary:      Effort: Pulmonary effort is normal. No respiratory distress.      Breath sounds: Normal breath sounds. No wheezing, rhonchi or rales.   Abdominal:      General: Bowel sounds are normal.      Palpations: Abdomen is soft.      Tenderness: There is no abdominal tenderness (RUQ). There is no right CVA tenderness, left CVA tenderness, guarding or rebound.      Comments: No further abdominal tenderness   Musculoskeletal:         General: No swelling. Normal range of motion.      Cervical back: Normal range of motion and neck supple.   Skin:     General: Skin is warm and dry.      Capillary Refill: Capillary refill takes less than 2 seconds.      Findings: No rash.   Neurological:      General: No focal deficit present.      Mental Status: He is alert and oriented to person, place, and  time. Mental status is at baseline.   Psychiatric:         Mood and Affect: Mood normal.         Behavior: Behavior normal.   Home Medications     Medication List      START taking these medications     amoxicillin-clavulanate 875-125 mg tablet; Commonly known as: Augmentin;   Take 1 tablet by mouth 2 times a day for 10 days.   Eliquis 5 mg tablet; Generic drug: apixaban; Take 1 tablet (5 mg) by   mouth 2 times a day.   metroNIDAZOLE 500 mg tablet; Commonly known as: Flagyl; Take 1 tablet   (500 mg) by mouth 2 times a day for 10 days. Do not drink alcohol while on   this medication.   potassium chloride CR 20 mEq ER tablet; Commonly known as: Klor-Con M20;   Take 1 tablet (20 mEq) by mouth once daily. Do not crush or chew.     CONTINUE taking these medications     amLODIPine 10 mg tablet; Commonly known as: Norvasc; Take 1 tablet (10   mg) by mouth once daily.   aspirin 81 mg EC tablet   atorvastatin 80 mg tablet; Commonly known as: Lipitor; Take 1 tablet (80   mg) by mouth once daily.   chlorthalidone 25 mg tablet; Commonly known as: [place]groton; Take 1 tablet   (25 mg) by mouth once daily.   ezetimibe 10 mg tablet; Commonly known as: Zetia; Take 1 tablet (10 mg)   by mouth once daily.   meclizine 25 mg tablet; Commonly known as: Antivert; Take 1 tablet (25   mg) by mouth 3 times a day as needed for dizziness.   metoprolol succinate XL 25 mg 24 hr tablet; Commonly known as:   Toprol-XL; Take 1 tablet (25 mg) by mouth once daily.   pantoprazole 40 mg EC tablet; Commonly known as: ProtoNix; Take 1 tablet   (40 mg) by mouth once daily. Do not crush, chew, or split.   Vitamin D3 125 mcg (5,000 units) tablet; Generic drug: cholecalciferol       Outpatient Follow-Up  PCP, cardiology, surgery    Eze Baez MD

## 2025-07-16 NOTE — PROGRESS NOTES
PROGRESS NOTE    HPI:  Neftali Schmitt is a 80 y.o. male who presented to the emergency department with right upper quadrant abdominal pain.  Patient states that around 2 AM he woke up with right-sided pain.  He drink some ginger ale with some improvement of his symptoms but as it was not completely resolved he decided to come to the emergency department for evaluation.  Patient was subsequently found to have evidence of acute cholecystitis.  Patient was seen by general surgery who recommended cholecystostomy tube and antibiotic therapy followed by outpatient surgery in 4 to 6 weeks.  Patient declined undergoing cholecystostomy tube.  Cardiology was consulted for abnormal troponin.  When I went to evaluate the patient he was noted to be tachycardic with telemetry showing what appeared to be atrial flutter with heart rate of 140 bpm.  ECG done this morning showed atrial flutter with a heart rate of 137 bpm.  Started the patient on a heparin infusion and gave IV metoprolol.  Telemetry now showing atrial fibrillation with heart rate in the 120s.  BMP showing a serum sodium of 137, serum potassium 3.6, serum creatinine is 0.94.  INR was 1.1.  Troponin was 58-49.  CBC showed hemoglobin of 13.3. Chest x-ray done 7/10/2025 showed opacity in the right posterior costophrenic angle, possible small enlarging pleural effusion or focal consolidation.  CT scan of the abdomen/pelvis done 7/10/2025 showed distended gallbladder with questionable underlying gallstones, gallbladder wall thickening and mild pericholecystic haziness suggestive of acute cholecystitis, mild biliary dilatation with the common bile duct measuring up to 10 mm.  Ultrasound of the gallbladder done 7/10/2025 showed gallbladder sludge with borderline gallbladder wall thickening, no cholelithiasis, increased echogenicity of the right renal cortex, no hydronephrosis, renal cyst, diffuse hepatic steatosis.  Nuclear medicine scan done 7/10/2025 was abnormal with  findings consistent with acute cholecystitis.  During my exam patient was resting in a bedside chair.     Subjective Data:  Patient telemetry shows that he has converted back to sinus rhythm.  Patient has no cardiac symptoms such as chest pain palpitations or shortness of breath.  If he pushes on his belly hard enough still some mild right upper quadrant pain but while I am talking to him he is eating pancakes and sausage with no GI complaints.  He remains on a heparin infusion and plan is for stress testing on Monday.  7-13-25: Patient sitting up in chair.  Remains on heparin infusion.  No cardiac complaints or concerns.  Telemetry sinus bradycardia 58 plan for stress test in the morning.  7-14-25:  Patient seen in stress lab.  No cardiac complaints.  No GI complaints.  Reviewed briefly with surgery.  If stress is OK he can be discharged however if abnormal will need to discuss C.    7-15-25: Patient resting comfortably.  Stress testing yesterday shows lateral ischemia.  Review of old records show that the patient had remote bypass surgery in 2007 with LIMA to LAD vein graft to the obtuse marginal and vein graft to the PDA.  No cardiac symptoms whatsoever.  Telemetry shows sinus rhythm 65.  He is n.p.o. and after discussing the stress results with him he is agreeable to proceed with left heart catheterization.  7-16-25: Patient resting comfortably.  Denies chest pain shortness of breath or palpitations.  He had no further atrial fibrillation since yesterday morning at which time he was hypokalemic.  Right femoral access is unremarkable.  He has been started on Eliquis.  Heart catheter results reviewed and discussed with Dr. Mata regarding his outpatient cholecystectomy and that we will be able to hold Eliquis 48 hours prior to the gallbladder removal.  Telemetry currently sinus rhythm.    Overnight Events:    None     Objective Data:  Last Recorded Vitals:  Vitals:    07/14/25 1934 07/14/25 2335 07/15/25 0501  07/15/25 0751   BP: 130/56 123/64 116/65 128/75   BP Location: Right arm Left arm Left arm Right arm   Patient Position: Sitting Lying Lying Lying   Pulse: 58 67 75 72   Resp: 18 18 18 18   Temp: 36.4 °C (97.5 °F) 36.6 °C (97.9 °F) 36.6 °C (97.8 °F) 36.6 °C (97.8 °F)   TempSrc: Temporal Temporal Temporal Temporal   SpO2: 95% 96% 98% 95%   Weight:   88 kg (194 lb 0.1 oz)    Height:           Last Labs:  CBC - 7/14/2025: 11:58 PM  8.0 13.4 250    37.9      CMP - 7/15/2025:  4:44 AM  9.1 6.5 21 --- 1.9   _ 3.9 24 71      PTT - 7/11/2025: 10:25 AM  1.1   12.6 35     Last I/O:  I/O last 3 completed shifts:  In: 890 (10.1 mL/kg) [P.O.:240; IV Piggyback:650]  Out: - (0 mL/kg)   Weight: 88 kg     Past Cardiology Tests (Last 3 Years):  Echo: CONCLUSIONS:   1. Poorly visualized anatomical structures due to suboptimal image quality.   2. The left ventricular systolic function is mildly decreased with a visually estimated ejection fraction of 40-45%.   3. Left ventricular diastolic filling was indeterminate.   4. Mild to moderate mitral valve regurgitation.   5. The Doppler estimated RVSP is within normal limits at 24 mmHg.  Noted above change in EF dating back to echo from 2021 when his echo showed normal LV systolic function    STRESS YESTERDAY--IMPRESSION:  Medium-sized area of reversible perfusion defect, consistent with  ischemia in left circumflex territory. Mild left ventricular systolic  dysfunction on post stress gated imaging.  1. Pertinent findings if any on low-dose non gated CT images-severe  coronary artery calcification. Calcification of the thoracic aorta.  Small left-sided pleural effusion.    [facility]--Recommendations:  Maximize medical therapy.  Agressive risk factor modification efforts.  Follow-up with cardiology clinic.     ____________________________________________________________________________________  CONCLUSIONS:   1. Severe triple native vessel disease (Proximal LAD and RCA , ostial LCX ). Patent  LIMA-LAD and SVG-rPDA.   2. Occluded SVG-OM with competitive from L-L and R-L collaterals.   3. Optimize medical therapy, if he develops symptoms will plan on LCX  PCI.    Inpatient Medications:  Scheduled Medications[1]    Review of Systems   Constitutional: Negative for fever and malaise/fatigue.   Cardiovascular:  Negative for chest pain, orthopnea and palpitations.   Respiratory:  Negative for shortness of breath and wheezing.    Skin:  Negative for itching and rash.   Gastrointestinal:  Negative for abdominal pain, diarrhea, nausea and vomiting.   Genitourinary:  Negative for dysuria.   Neurological:  Negative for weakness.        Physical Exam  Constitutional:       General: He is not in acute distress.  HENT:      Mouth/Throat:      Mouth: Mucous membranes are moist.   Neck:      Comments: Flat neck veins  Cardiovascular:      Rate and Rhythm: Normal rate and regular rhythm.      Heart sounds: Normal heart sounds. No murmur heard.  Pulmonary:      Effort: Pulmonary effort is normal.      Breath sounds: Normal breath sounds.   Abdominal:      General: Abdomen is flat. Bowel sounds are normal.      Palpations: Abdomen is soft.      Comments: No abdominal pain     Musculoskeletal:         General: No swelling.      Comments: Right femoral access unremarkable     Skin:     General: Skin is warm and dry.     Neurological:      Mental Status: He is alert and oriented to person, place, and time.     Psychiatric:         Mood and Affect: Mood normal.        ASSESSMENT/PLAN  1.  Paroxysmal atrial fibrillation/flutter  Patient is newly diagnosed with paroxysmal atrial fibrillation/flutter.  Patient started on a heparin infusion for anticoagulation.  Patient given IV beta-blocker to help lower heart rate.  I will increase beta-blocker therapy.  I discussed with him possible CARRI/cardioversion although patient stated he did not want to pursue at this time as he believes his elevated heart rate was related to being  upset regarding the recommendation of a cholecystostomy tube.  Will continue to adjust medications for rate control for now.  Will rediscuss rhythm control approach with patient when less upset.  7-12-25: Patient back in sinus rhythm.  No cardiac complaints otherwise.  Continue heparin infusion until we get stress results on Monday and if there is no evidence of ischemia can transition to Eliquis.  7-13-25: Remains in sinus rhythm.  Continue heparin.  Stress testing in the a.m.  If negative stress test needs transition to Eliquis.    7-14-25:  No further AF.  Continue meds.  Minor nosebleed overnight on heparin.  If stress is negative will need transition to eliquis.  If abnormal keep on heparin.  7-15-25: Patient remains in sinus rhythm and remains on a heparin infusion.  7-16-25:  He did have recurrent A-fib yesterday in the setting of low potassium.  Will initiate low-dose daily potassium considering that he is on thiazide diuretic.  Patient has been taken off of heparin and placed on Eliquis and again Eliquis can be held 48 hours prior to cholecystectomy.     2.  Coronary artery disease/elevated troponin  The patient has a history of coronary artery disease status post bypass done in 2007.  Patient now found to have mildly abnormal troponin of 58-49.  Patient denies any anginal chest discomfort.  ECG done this morning at 848 am showed atrial flutter with a heart rate of 137 bpm and ST segment depression in V2 through V6.  Will check echocardiogram and stress study for additional evaluation.  7-12-25:  No chest pain or angina however his echocardiogram shows EF of 40 to 45% which is changed from his last echo in 2021 at which time EF was normal.  No specific wall motion abnormalities noted on the echo  7-13-25: No cardiac symptoms.  Again stress test in the morning.  7-14-25:  Further plan pending outcome of stress.  7-15-25: Lateral ischemia noted on stress testing.  He did have remote bypass surgery in 2007  with LIMA to LAD vein graft to the obtuse marginal and vein graft to the PDA.  Left heart catheterization today.  7-16-25: Left heart cath results as noted.  He had circumflex territory ischemia which likely correlates with the occluded vein graft to OM but the patient did have left to left and right to left collaterals.  Continue current medical therapy     3.  Carotid artery disease/vertebral artery disease  Stable, continue with risk factor modification     4.  Hypertension  The patient has a history of hypertension which appears under moderate control.  Continue to monitor and adjust antihypertensive medical therapy while here.  7-12-25: Blood pressure is well-controlled on current meds.  7-13-25: Blood pressures remain well-controlled    7-14-25: BP's remain well controlled.  7-15-25: Blood pressures remain well-controlled.  7-16-2 blood pressures remain well-controlled.5:       5.  Dyslipidemia  Continue statin therapy     6.  Acute cholecystitis  Patient was seen by general surgery who recommended cholecystostomy tube and antibiotic therapy followed by outpatient surgery in 4 to 6 weeks.    Patient declined undergoing cholecystostomy tube.    Management per general surgery/hospitalist service  7-12-25: Patient eating normal diet with no significant GI symptoms although he still has mild right upper quadrant tenderness.  Patient is scheduled as an outpatient for cholecystectomy on 22 July.  Again we will get the stress test on Monday for further restratification.  Recommendations--patient back in sinus rhythm.  Continue heparin infusion until we know the results of the stress test on Monday.  He does have change in his overall EF to 40 to 45% compared to echo in 2021.  No overt CHF.  Blood pressures are controlled.  Patient with some hypokalemia and replacement has been ordered via IV and orally .  Repeat labs in AM.  7-14-25:  OP surgery 7-22-25 if no further cardiac issues otherwise if he requires  intervention this will need delayed. Patient verbalizes understanding.  7-15-25: No GI complaints.  If patient does have intervention this will delay cholecystectomy.  7-16-25: Looks like the patient is going to be scheduled for cholecystectomy in mid August or so.  Again cath results and plan relayed to surgery.    Recommendations--patient stable from cardiac standpoint for discharge today.  Again the patient is now on Eliquis and I will start daily dosing of potassium considering he  is on thiazide diuretic likely causing lower potassium which I think is affecting the A-fib.  Will arrange close outpatient follow-up.      Eze Langston PA-C  7/15/2025  8:52 AM                 [1]   Scheduled medications   Medication Dose Route Frequency    amLODIPine  10 mg oral Daily    aspirin  81 mg oral Daily    atorvastatin  80 mg oral Nightly    chlorthalidone  25 mg oral Daily    cholecalciferol  125 mcg oral Daily    ezetimibe  10 mg oral Daily    metoprolol succinate XL  25 mg oral BID    pantoprazole  40 mg oral Daily before breakfast    Or    pantoprazole  40 mg intravenous Daily before breakfast    perflutren lipid microspheres  0.5-10 mL of dilution intravenous Once in imaging    perflutren protein A microsphere  0.5 mL intravenous Once in imaging    piperacillin-tazobactam  3.375 g intravenous q6h    polyethylene glycol  17 g oral Daily    sulfur hexafluoride microsphr  2 mL intravenous Once in imaging

## 2025-07-16 NOTE — CARE PLAN
The patient's goals for the shift include adequate pain control    The clinical goals for the shift include .    Over the shift, the patient did not make progress toward the following goals. Barriers to progression include . Recommendations to address these barriers include .

## 2025-07-17 ENCOUNTER — PATIENT OUTREACH (OUTPATIENT)
Dept: PRIMARY CARE | Facility: CLINIC | Age: 80
End: 2025-07-17
Payer: MEDICARE

## 2025-07-17 RX ORDER — ATORVASTATIN CALCIUM 80 MG/1
80 TABLET, FILM COATED ORAL DAILY
Qty: 90 TABLET | Refills: 1 | Status: SHIPPED | OUTPATIENT
Start: 2025-07-17

## 2025-07-17 NOTE — PROGRESS NOTES
Discharge Facility:[facility]  Discharge Diagnosis:Calculus of gallbladder with acute cholecystitis  Admission Date:7/10/25  Discharge Date: 7/16/25    PCP Appointment Date:7/28/25  Specialist Appointment Date: ED to Hosp-Admission (Discharged) with Eze Baez MD; Anna Moran MD (07/10/2025)   Hospital Encounter and Summary Linked: Yes/  See discharge assessment below for further details  Wrap Up  Wrap Up Additional Comments: discused discharge patient stated that he is improving. provided contact information encouraged call with questions. (7/17/2025  9:17 AM)    Engagement  Call Start Time: 0917 (amox/clav 875/125mg eliquis 5mg flaygl 500mg klor con 20meq) (7/17/2025  9:17 AM)    Medications  Medications reviewed with patient/caregiver?: Yes (7/17/2025  9:17 AM)  Is the patient having any side effects they believe may be caused by any medication additions or changes?: No (7/17/2025  9:17 AM)  Does the patient have all medications ordered at discharge?: Yes (7/17/2025  9:17 AM)  Is the patient taking all medications as directed (includes completed medication regime)?: Yes (7/17/2025  9:17 AM)    Appointments  Does the patient have a primary care provider?: Yes (7/17/2025  9:17 AM)  Care Management Interventions: Verified appointment date/time/provider (7/17/2025  9:17 AM)  Has the patient kept scheduled appointments due by today?: Yes (7/17/2025  9:17 AM)    Self Management  What is the home health agency?: N/A (7/17/2025  9:17 AM)  Has home health visited the patient within 72 hours of discharge?: Not applicable (7/17/2025  9:17 AM)  What Durable Medical Equipment (DME) was ordered?: N/A (7/17/2025  9:17 AM)  Has all Durable Medical Equipment (DME) been delivered?: No (7/17/2025  9:17 AM)    Patient Teaching  Does the patient have access to their discharge instructions?: Yes (7/17/2025  9:17 AM)  Care Management Interventions: Reviewed instructions with patient (7/17/2025  9:17 AM)  What is the  patient's perception of their health status since discharge?: Improving (7/17/2025  9:17 AM)  Is the patient/caregiver able to teach back the hierarchy of who to call/visit for symptoms/problems? PCP, Specialist, Home Health nurse, Urgent Care, ED, 911: Yes (7/17/2025  9:17 AM)

## 2025-07-22 ENCOUNTER — APPOINTMENT (OUTPATIENT)
Facility: CLINIC | Age: 80
End: 2025-07-22
Payer: MEDICARE

## 2025-07-22 VITALS
BODY MASS INDEX: 32.3 KG/M2 | HEART RATE: 61 BPM | OXYGEN SATURATION: 97 % | DIASTOLIC BLOOD PRESSURE: 64 MMHG | WEIGHT: 201 LBS | SYSTOLIC BLOOD PRESSURE: 118 MMHG | HEIGHT: 66 IN

## 2025-07-22 DIAGNOSIS — K80.00 CALCULUS OF GALLBLADDER WITH ACUTE CHOLECYSTITIS WITHOUT OBSTRUCTION: Primary | ICD-10-CM

## 2025-07-22 PROCEDURE — 1159F MED LIST DOCD IN RCRD: CPT | Performed by: SURGERY

## 2025-07-22 PROCEDURE — 1160F RVW MEDS BY RX/DR IN RCRD: CPT | Performed by: SURGERY

## 2025-07-22 PROCEDURE — 1111F DSCHRG MED/CURRENT MED MERGE: CPT | Performed by: SURGERY

## 2025-07-22 PROCEDURE — 99213 OFFICE O/P EST LOW 20 MIN: CPT | Performed by: SURGERY

## 2025-07-22 PROCEDURE — 3074F SYST BP LT 130 MM HG: CPT | Performed by: SURGERY

## 2025-07-22 PROCEDURE — 3078F DIAST BP <80 MM HG: CPT | Performed by: SURGERY

## 2025-07-22 ASSESSMENT — ENCOUNTER SYMPTOMS
CONFUSION: 0
DYSURIA: 0
NAUSEA: 0
ABDOMINAL PAIN: 0
CONSTIPATION: 0
COUGH: 0
ARTHRALGIAS: 1
FEVER: 0
HEADACHES: 0
WOUND: 0
FLANK PAIN: 0
MYALGIAS: 0
FATIGUE: 0
HEMATURIA: 0
WEAKNESS: 0
EYE REDNESS: 0
VOMITING: 0
AGITATION: 0
SPEECH DIFFICULTY: 0
POLYPHAGIA: 0
BRUISES/BLEEDS EASILY: 1
DIARRHEA: 0
CHILLS: 0
EYE PAIN: 0
SHORTNESS OF BREATH: 0
FREQUENCY: 0

## 2025-07-22 NOTE — PROGRESS NOTES
GENERAL SURGERY OFFICE NOTE    Patient: Neftali Schmitt    Age: 80 y.o.   Gender: male    MRN: [id number]    PCP: Kiran Beard MD        SUBJECTIVE     Chief Complaint  New Patient Visit (Patient is here for a hospital follow up for cholecystitis. Patient states that when he takes his medication he feels nauseas for a little while. Patient states that he is not having any abdominal pains. )       MAXIMINO Lindsay is an 80-year-old white male who presents to the office after recent hospitalization for acute cholecystitis.  He was treated with antibiotics as he had elevated troponin levels and required a cardiac evaluation during his hospitalization.  He eventually was cleared by cardiology after cardiac catheterization.  His abdominal pain has resolved.  He is taking the oral antibiotics as prescribed.  He is sticking to a low grease, low-fat diet per his wife.  He was started on Eliquis by cardiology, but has been given permission to stop this perioperatively.  He is ready to schedule surgery.    ROS  Review of Systems   Constitutional:  Negative for chills, fatigue and fever.   HENT:  Negative for congestion, ear pain and hearing loss.    Eyes:  Negative for pain and redness.   Respiratory:  Negative for cough and shortness of breath.    Cardiovascular:  Negative for chest pain and leg swelling.   Gastrointestinal:  Negative for abdominal pain, constipation, diarrhea, nausea and vomiting.   Endocrine: Negative for polyphagia.   Genitourinary:  Negative for dysuria, flank pain, frequency and hematuria.   Musculoskeletal:  Positive for arthralgias. Negative for myalgias.   Skin:  Negative for rash and wound.   Allergic/Immunologic: Negative for immunocompromised state.   Neurological:  Negative for speech difficulty, weakness and headaches.   Hematological:  Bruises/bleeds easily.   Psychiatric/Behavioral:  Negative for agitation and confusion.           HISTORY     Past Medical History:   Diagnosis Date     Abnormal auditory perception of both ears 2023    GERD (gastroesophageal reflux disease)     Hyperlipidemia     Hypertension     Immunization not carried out because of patient refusal     Refused pneumococcal vaccination    Personal history of other diseases of the circulatory system     History of hypertension    Personal history of other diseases of the musculoskeletal system and connective tissue     History of arthritis    Personal history of other drug therapy     COVID-19 vaccine series completed    Severe obesity (BMI 35.0-39.9) with comorbidity (Multi) 2023        Past Surgical History:   Procedure Laterality Date    CARDIAC CATHETERIZATION N/A 07/15/2025    Procedure: LHC No LV With Grafts;  Surgeon: Senthil Mendiola MD;  Location: [facility];  Service: Cardiovascular;  Laterality: N/A;    CT ANGIO NECK  2023    CT NECK ANGIO W AND WO IV CONTRAST [facility]    HERNIA REPAIR      OTHER SURGICAL HISTORY  2020    Coronary artery bypass graft        Family History   Problem Relation Name Age of Onset    Cancer Father          Allergies   Allergen Reactions    Losartan Rash        Social History     Tobacco Use   Smoking Status Former    Current packs/day: 0.00    Types: Cigarettes    Quit date:     Years since quittin.5    Passive exposure: Past   Smokeless Tobacco Never        Social History     Substance and Sexual Activity   Alcohol Use Yes    Alcohol/week: 2.0 standard drinks of alcohol    Types: 2 Shots of liquor per week    Comment: once in a while        HOME MEDICATIONS  Current Outpatient Medications   Medication Instructions    amLODIPine (NORVASC) 10 mg, oral, Daily    amoxicillin-clavulanate (Augmentin) 875-125 mg tablet 1 tablet, oral, 2 times daily    aspirin 81 mg EC tablet     atorvastatin (LIPITOR) 80 mg, oral, Daily    chlorthalidone (HYGROTON) 25 mg, oral, Daily    cholecalciferol (VITAMIN D3) 5,000 Units, Daily    Eliquis 5 mg, oral, 2 times daily     "ezetimibe (ZETIA) 10 mg, oral, Daily    meclizine (ANTIVERT) 25 mg, oral, 3 times daily PRN    metoprolol succinate XL (TOPROL-XL) 25 mg, oral, Daily    metroNIDAZOLE (FLAGYL) 500 mg, oral, 2 times daily, Do not drink alcohol while on this medication.    pantoprazole (PROTONIX) 40 mg, oral, Daily, Do not crush, chew, or split.    potassium chloride CR (Klor-Con M20) 20 mEq ER tablet 20 mEq, oral, Daily, Do not crush or chew.          OBJECTIVE   Last Recorded Vitals.  Blood pressure 118/64, pulse 61, height 1.676 m (5' 6\"), weight 91.2 kg (201 lb), SpO2 97%.     PHYSICAL EXAM  Physical Exam   General: Well-developed, well-nourished and in no acute distress.  Head: Normocephalic. Atraumatic.  Neck/thyroid: Neck is supple.   Eyes: Pupils equal round and reactive to light. Conjunctiva normal.  ENMT: No masses or deformity of external nose. External ears without masses.  Respiratory/Chest:  Normal respiratory effort.  Cardiovascular: Regular rate and rhythm.   Abdomen: Abdomen slightly rounded secondary to body habitus.  Soft, nontender, nondistended.  Small umbilical hernia just above the umbilical stalk with reducible contents.  Musculoskeletal: Joints and limbs are grossly normal. Normal gait. Normal range of motion of major joints.  Neuro: Oriented to person, place and time. No obvious neurological deficit. Motor strength grossly normal.  Psych: Normal mood and affect.    RESULTS   LABS  CBC  Order: [id number]   Status: Final result    Test Result Released: Yes (seen)    0 Result Notes            Component  Ref Range & Units 6 d ago 8 d ago 8 d ago 9 d ago 10 d ago 11 d ago 12 d ago   WBC  4.4 - 11.3 x10*3/uL 8.2 8.0 6.5 8.0 7.0 9.5 17.0 High    nRBC  0.0 - 0.0 /100 WBCs 0.0 0.0 0.0 0.0 0.0 0.0 0.0   RBC  4.50 - 5.90 x10*6/uL 4.24 Low  4.20 Low  4.26 Low  4.12 Low  3.71 Low  4.26 Low  4.52   Hemoglobin  13.5 - 17.5 g/dL 13.2 Low  13.4 Low  13.5 12.9 Low  11.8 Low  13.3 Low  14.4   Hematocrit  41.0 - 52.0 % 39.5 Low  " 37.9 Low  40.7 Low  38.0 Low  34.0 Low  39.2 Low  41.8   MCV  80 - 100 fL 93 90 96 92 92 92 93   MCH  26.0 - 34.0 pg 31.1 31.9 31.7 31.3 31.8 31.2 31.9   MCHC  32.0 - 36.0 g/dL 33.4 35.4 33.2 33.9 34.7 33.9 34.4   RDW  11.5 - 14.5 % 13.7 13.4 13.6 13.6 13.6 13.9 13.7   Platelets  150 - 450 x10*3/uL 257 250 235 207 175 194 209   Resulting Agency [facility]              Contains abnormal data Comprehensive Metabolic Panel  Order: [id number]   Status: Final result    Test Result Released: Yes (not seen)    0 Result Notes   important suggestion  Newer results are available. Click to view them now.               Component  Ref Range & Units 11 d ago  (7/11/25) 12 d ago  (7/10/25) 8 mo ago  (11/14/24) 1 yr ago  (7/10/24) 1 yr ago  (3/20/24) 1 yr ago  (2/20/24) 1 yr ago  (9/20/23)   Glucose  74 - 99 mg/dL 101 High  138 High  98 114 High  108 High  116 High  110 High    Sodium  136 - 145 mmol/L 137 135 Low  140 140 138 139 141   Potassium  3.5 - 5.3 mmol/L 3.6 3.6 3.7 4.9 3.6 3.6 4.2   Chloride  98 - 107 mmol/L 99 96 Low  101 102 102 103 102   Bicarbonate  21 - 32 mmol/L 32 29 30 31 28 26 32   Anion Gap  10 - 20 mmol/L 10 14 13 12 12 14 11   Urea Nitrogen  6 - 23 mg/dL 11 15 18 21 15 15 17   Creatinine  0.50 - 1.30 mg/dL 0.94 0.95 0.89 0.88 0.87 0.86 0.99   eGFR  >60 mL/min/1.73m*2 81 80 CM 86 CM 86 CM 87 CM 88 CM    Comment: Calculations of estimated GFR are performed using the 2021 CKD-EPI Study Refit equation without the race variable for the IDMS-Traceable creatinine methods.  https://jasn.asnjournals.org/content/early/2021/09/22/ASN.5351604476   Calcium  8.6 - 10.3 mg/dL 8.9 9.5 8.8 9.6 8.8 9.2 9.3   Albumin  3.4 - 5.0 g/dL 3.9 4.4 4.3 4.5 4.3  4.5   Alkaline Phosphatase  33 - 136 U/L 71 68 65 70 64  69   Total Protein  6.4 - 8.2 g/dL 6.5 7.4 6.4 6.9 6.9  6.7   AST  9 - 39 U/L 21 18 23 34 30  29   Bilirubin, Total  0.0 - 1.2 mg/dL 1.9 High  1.8 High  1.0 1.1 0.8  1.0   ALT  10 - 52 U/L 24 20 CM 33 CM 44 CM 47 CM  47 CM    Comment: Patients treated with Sulfasalazine may generate falsely decreased results for ALT.   Resulting Agency [facility]              RADIOLOGY RESULTS  NM hepatobiliary  Status: Edited Result - FINAL     PACS Images     Show images for NM hepatobiliary  Signed by    Signed Time Phone Pager   Tabatha Romo MD 7/10/2025 16:33 [phone]      Exam Information    Status Exam Begun Exam Ended   Final 7/10/2025 14:10 7/10/2025 16:23     Study Result    Narrative & Impression   Interpreted By:  Tabatha Romo,   STUDY:  NM HEPATOBILIARY W CHOLECYSTOKININ;  7/10/2025 4:23 pm      INDICATION:  Signs/Symptoms:RUQ pain.          COMPARISON:  None.      ACCESSION NUMBER(S):  [id number]      ORDERING CLINICIAN:  MARIELOS LOYA      TECHNIQUE:  DIVISION OF NUCLEAR MEDICINE  HEPATOBILIARY SCAN (HIDA)      The patient received an intravenous dose of 5.5 mCi of Tc-99m  mebrofenin (Choletec).  Sequential images of the upper abdomen were  then acquired over the next 120 minutes.      FINDINGS:  There is prompt accumulation of activity within the liver and normal  subsequent excretion via the biliary ductal system into the small  bowel.  The gallbladder was not visualized by the end of 120 min  dynamic imaging, this finding concerning for acute obstructive  cholecystitis      IMPRESSION:  Abnormal hepatobiliary imaging, finding compatible with acute  cholecystitis with obstructive cystic duct Normal hepatic uptake  function and patent biliary system     US gallbladder  Status: Final result     PACS Images     Show images for US gallbladder  Signed by    Signed Time Phone Pager   Juan Antonio Rodarte MD 7/10/2025 12:13 [phone]      Exam Information    Status Exam Begun Exam Ended   Final 7/10/2025 11:04 7/10/2025 11:55     Study Result    Narrative & Impression   STUDY:  Right Upper Quadrant Ultrasound; 7/10/2025 11:56 AM  INDICATION:  RUQ pain.  COMPARISON:  CT A/P 7/10/2025.  ACCESSION  NUMBER(S):  [id number]  ORDERING CLINICIAN:  MARIELOS LOYA  TECHNIQUE:  Ultrasound of the Right Upper Quadrant.  FINDINGS:  LIVER:  The liver demonstrates increased echogenicity.       GALLBLADDER:  The gallbladder contains sludge.  There are no stones.  There is no  pericholecystic fluid or wall thickening.  Sonographic Rosen's sign  is negative.     BILE DUCTS:  The common bile duct measures 0.4 cm.  There is no intrahepatic  biliary dilatation.       PANCREAS:  The pancreas is not seen due to overlying bowel gas.      RIGHT KIDNEY:  The right kidney measures 12.3 cm in length.  Renal cortical  echotexture is increased.  There is no hydronephrosis.  There are no  stones.  There are multiple cysts with the largest located within the  upper pole measuring 5.3 x 2.6 x 4.1 cm.  IMPRESSION:  Gallbladder sludge with borderline gallbladder wall thickening.  No  cholelithiasis, gallbladder wall thickening, or biliary dilatation is  appreciated.  Given the findings on CT, consider further evaluation  with a HIDA scan.  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  No hydronephrosis.   Right renal cyst measuring 5.3 x 2.6 x 4.1 cm.  Diffuse hepatic steatosis.       PATHOLOGY      The above labs, radiology films and pathology reports were reviewed by myself.   Referring physician notes and other providers notes reviewed.      ASSESSMENT / PLAN   Diagnoses and all orders for this visit:  Calculus of gallbladder with acute cholecystitis without obstruction  -     Case Request Operating Room: Laparoscopic cholecystectomy  Other orders  -     Referral to General Surgery      Plan  1.  The benefits and risk of the laparoscopic cholecystectomy have been reviewed with the patient.  The nature of the procedure was reviewed with the patient which included the risk and benefits of having surgery.  Alternative treatment options were reviewed. The risks included, but not limited to,  infection, bleeding, injury surrounding organs, possible need for open procedure, possible need for other procedure, hernia formation at any incision site, postoperative bile leak, allergic reaction to medication and death.  2.  Educational material has been given to the patient regarding gallbladder surgery. We also discussed postcholecystectomy urgency/diarrhea.  3.  The patient has been encouraged to stay on a low crease, low-fat diet to help control symptoms pre-and postoperatively.  4.  Although his bilirubin was slight the elevated on presentation, his other LFTs were all within normal limits, and radiographic evaluation did not identify any dilated bile ducts.  Therefore, do not feel that this is related to a common bile duct stone or obstruction.  Can check LFTs on the day of surgery.  If remains elevated, can consider intraoperative cholangiogram.  5.  He is on chronic anticoagulation with Eliquis.  He will hold this 2 days preoperatively.  6.  He will finish the Augmentin and Flagyl antibiotics for the acute cholecystitis.  Scheduled for delayed cholecystectomy on 8/21/2025.      Fela Mata MD, FACS  [facility]  [address] [place] [address];   [email or web address]  [place]  [address] [phone]

## 2025-07-22 NOTE — PATIENT INSTRUCTIONS
Stay on a low grease, low-fat diet until after your gallbladder surgery.  See the dietary sheet provided from the office.  Finish both the Augmentin and Flagyl antibiotics as prescribed.  Take until gone even if you are feeling better.  If you have any recurrent abdominal pain similar to your previous gallbladder attack, please call Dr. Mata's office.  [phone]  You are scheduled for gallbladder removal surgery on 8/21/2025.  Please, read the patient preoperative instruction sheet BEFORE your surgery.  You need to STOP THE ELIQUIS 2 DAYS BEFORE SURGERY.

## 2025-07-23 ENCOUNTER — TELEPHONE (OUTPATIENT)
Dept: CARDIOLOGY | Facility: HOSPITAL | Age: 80
End: 2025-07-23
Payer: MEDICARE

## 2025-07-23 DIAGNOSIS — E11.9 DIABETES MELLITUS TYPE 2, DIET-CONTROLLED: Primary | ICD-10-CM

## 2025-07-23 NOTE — TELEPHONE ENCOUNTER
Called and confirmed patients appointment and requested an updated list of medications be brought with them.   Jaylyn Arzate MA

## 2025-07-24 LAB
ALBUMIN SERPL-MCNC: 4.4 G/DL (ref 3.6–5.1)
ALP SERPL-CCNC: 67 U/L (ref 35–144)
ALT SERPL-CCNC: 81 U/L (ref 9–46)
ANION GAP SERPL CALCULATED.4IONS-SCNC: 9 MMOL/L (CALC) (ref 7–17)
AST SERPL-CCNC: 40 U/L (ref 10–35)
BILIRUB SERPL-MCNC: 0.8 MG/DL (ref 0.2–1.2)
BUN SERPL-MCNC: 12 MG/DL (ref 7–25)
CALCIUM SERPL-MCNC: 9.8 MG/DL (ref 8.6–10.3)
CHLORIDE SERPL-SCNC: 101 MMOL/L (ref 98–110)
CHOLEST SERPL-MCNC: 125 MG/DL
CHOLEST/HDLC SERPL: 2.8 (CALC)
CO2 SERPL-SCNC: 29 MMOL/L (ref 20–32)
CREAT SERPL-MCNC: 0.9 MG/DL (ref 0.7–1.22)
EGFRCR SERPLBLD CKD-EPI 2021: 85 ML/MIN/1.73M2
EST. AVERAGE GLUCOSE BLD GHB EST-MCNC: 134 MG/DL
EST. AVERAGE GLUCOSE BLD GHB EST-SCNC: 7.4 MMOL/L
GLUCOSE SERPL-MCNC: 111 MG/DL (ref 65–99)
HBA1C MFR BLD: 6.3 %
HDLC SERPL-MCNC: 45 MG/DL
LDLC SERPL CALC-MCNC: 60 MG/DL (CALC)
NONHDLC SERPL-MCNC: 80 MG/DL (CALC)
POTASSIUM SERPL-SCNC: 4.4 MMOL/L (ref 3.5–5.3)
PROT SERPL-MCNC: 6.8 G/DL (ref 6.1–8.1)
SODIUM SERPL-SCNC: 139 MMOL/L (ref 135–146)
TRIGL SERPL-MCNC: 118 MG/DL

## 2025-07-28 ENCOUNTER — APPOINTMENT (OUTPATIENT)
Dept: PRIMARY CARE | Facility: CLINIC | Age: 80
End: 2025-07-28
Payer: MEDICARE

## 2025-07-28 VITALS
SYSTOLIC BLOOD PRESSURE: 121 MMHG | BODY MASS INDEX: 31.95 KG/M2 | OXYGEN SATURATION: 97 % | TEMPERATURE: 96.9 F | HEART RATE: 64 BPM | WEIGHT: 198.8 LBS | DIASTOLIC BLOOD PRESSURE: 72 MMHG | HEIGHT: 66 IN

## 2025-07-28 DIAGNOSIS — K80.00 CALCULUS OF GALLBLADDER WITH ACUTE CHOLECYSTITIS WITHOUT OBSTRUCTION: ICD-10-CM

## 2025-07-28 DIAGNOSIS — I25.10 CORONARY ARTERY DISEASE INVOLVING NATIVE CORONARY ARTERY OF NATIVE HEART WITHOUT ANGINA PECTORIS: Primary | ICD-10-CM

## 2025-07-28 DIAGNOSIS — I10 BENIGN ESSENTIAL HYPERTENSION: ICD-10-CM

## 2025-07-28 DIAGNOSIS — E11.9 DIABETES MELLITUS TYPE 2, DIET-CONTROLLED: ICD-10-CM

## 2025-07-28 DIAGNOSIS — E78.2 MIXED HYPERLIPIDEMIA: ICD-10-CM

## 2025-07-28 PROBLEM — R07.89 ATYPICAL CHEST PAIN: Status: RESOLVED | Noted: 2025-06-10 | Resolved: 2025-07-28

## 2025-07-28 PROCEDURE — 1158F ADVNC CARE PLAN TLK DOCD: CPT | Performed by: FAMILY MEDICINE

## 2025-07-28 PROCEDURE — 3074F SYST BP LT 130 MM HG: CPT | Performed by: FAMILY MEDICINE

## 2025-07-28 PROCEDURE — 1160F RVW MEDS BY RX/DR IN RCRD: CPT | Performed by: FAMILY MEDICINE

## 2025-07-28 PROCEDURE — 99496 TRANSJ CARE MGMT HIGH F2F 7D: CPT | Performed by: FAMILY MEDICINE

## 2025-07-28 PROCEDURE — 1111F DSCHRG MED/CURRENT MED MERGE: CPT | Performed by: FAMILY MEDICINE

## 2025-07-28 PROCEDURE — 1159F MED LIST DOCD IN RCRD: CPT | Performed by: FAMILY MEDICINE

## 2025-07-28 PROCEDURE — 3078F DIAST BP <80 MM HG: CPT | Performed by: FAMILY MEDICINE

## 2025-07-28 ASSESSMENT — ENCOUNTER SYMPTOMS
CHILLS: 0
FEVER: 0
CHEST TIGHTNESS: 0
PALPITATIONS: 0
ABDOMINAL PAIN: 0
CONFUSION: 0
SHORTNESS OF BREATH: 0
ARTHRALGIAS: 0

## 2025-07-28 ASSESSMENT — PATIENT HEALTH QUESTIONNAIRE - PHQ9
2. FEELING DOWN, DEPRESSED OR HOPELESS: NOT AT ALL
SUM OF ALL RESPONSES TO PHQ9 QUESTIONS 1 AND 2: 0
1. LITTLE INTEREST OR PLEASURE IN DOING THINGS: NOT AT ALL

## 2025-07-28 NOTE — ASSESSMENT & PLAN NOTE
Recent stress test and heart cath reviewed with patient medical management is being recommended per cardiology

## 2025-07-28 NOTE — ASSESSMENT & PLAN NOTE
Patient currently completing course of oral antibiotics with plans of cholecystectomy in a few weeks.

## 2025-07-28 NOTE — PROGRESS NOTES
"Patient: Neftali Schmitt  : 1945  PCP: Kiran Beard MD  MRN: [id number]  Program: Transitional Care Management  Status: Enrolled  Effective Dates: 2025 - present  Responsible Staff: Janine Glez LPN  Social Drivers to be Addressed: Physical Activity, Social Connections, Tobacco Use         Neftali Schmitt is a 80 y.o. male presenting today for follow-up after being discharged from the hospital 12 days ago. The main problem requiring admission was right upper quadrant abdominal pain.  The discharge summary and/or Transitional Care Management documentation was reviewed. Medication reconciliation was performed as indicated via the \"Jourdan as Reviewed\" timestamp.   Patient mated with right upper quadrant abdominal pain workup revealed acute cholecystitis.  During his stay also was evaluated by cardiology had an abnormal stress test showing reversible ischemia subsequently underwent heart cath although there was some blockages cardiology recommendation was optimizing medical management.  Patient was treated with antibiotics and is currently finishing an oral course of antibiotics at home with plans of cholecystectomy later in August.  He has an appointment to see cardiology next week for reevaluation and for clearance for upcoming surgery.  Symptomatically says he is doing well denies abdominal pain nausea vomiting tolerating diet.  Neftali Schmitt was contacted by Transitional Care Management services two days after his discharge. This encounter and supporting documentation was reviewed.    Review of Systems   Constitutional:  Negative for chills and fever.   HENT:  Negative for congestion and ear pain.    Eyes:  Negative for visual disturbance.   Respiratory:  Negative for chest tightness and shortness of breath.    Cardiovascular:  Negative for chest pain and palpitations.   Gastrointestinal:  Negative for abdominal pain.   Musculoskeletal:  Negative for arthralgias.   Skin:  Negative for " "pallor.   Psychiatric/Behavioral:  Negative for confusion.        /72   Pulse 64   Temp 36.1 °C (96.9 °F)   Ht 1.676 m (5' 6\")   Wt 90.2 kg (198 lb 12.8 oz)   SpO2 97%   BMI 32.09 kg/m²     Physical Exam  Vitals and nursing note reviewed.   Constitutional:       General: He is not in acute distress.     Appearance: Normal appearance. He is not ill-appearing.   HENT:      Head: Normocephalic and atraumatic.      Right Ear: Tympanic membrane, ear canal and external ear normal.      Left Ear: Tympanic membrane, ear canal and external ear normal.      Mouth/Throat:      Pharynx: Oropharynx is clear.     Eyes:      Extraocular Movements: Extraocular movements intact.       Cardiovascular:      Rate and Rhythm: Normal rate and regular rhythm.      Pulses: Normal pulses.      Heart sounds: Normal heart sounds.   Pulmonary:      Effort: Pulmonary effort is normal.      Breath sounds: Normal breath sounds.   Abdominal:      General: Abdomen is flat. Bowel sounds are normal.      Palpations: Abdomen is soft.      Tenderness: There is no abdominal tenderness.     Musculoskeletal:         General: Normal range of motion.      Cervical back: Neck supple.     Skin:     General: Skin is warm.     Neurological:      Mental Status: He is alert and oriented to person, place, and time. Mental status is at baseline.     Psychiatric:         Mood and Affect: Mood normal.       Recent Results (from the past 6 weeks)   CBC and Auto Differential    Collection Time: 07/10/25  9:29 AM   Result Value Ref Range    WBC 17.0 (H) 4.4 - 11.3 x10*3/uL    nRBC 0.0 0.0 - 0.0 /100 WBCs    RBC 4.52 4.50 - 5.90 x10*6/uL    Hemoglobin 14.4 13.5 - 17.5 g/dL    Hematocrit 41.8 41.0 - 52.0 %    MCV 93 80 - 100 fL    MCH 31.9 26.0 - 34.0 pg    MCHC 34.4 32.0 - 36.0 g/dL    RDW 13.7 11.5 - 14.5 %    Platelets 209 150 - 450 x10*3/uL    Neutrophils % 77.6 40.0 - 80.0 %    Immature Granulocytes %, Automated 0.4 0.0 - 0.9 %    Lymphocytes % 10.0 13.0 " - 44.0 %    Monocytes % 11.6 2.0 - 10.0 %    Eosinophils % 0.1 0.0 - 6.0 %    Basophils % 0.3 0.0 - 2.0 %    Neutrophils Absolute 13.23 (H) 1.60 - 5.50 x10*3/uL    Immature Granulocytes Absolute, Automated 0.06 0.00 - 0.50 x10*3/uL    Lymphocytes Absolute 1.70 0.80 - 3.00 x10*3/uL    Monocytes Absolute 1.97 (H) 0.05 - 0.80 x10*3/uL    Eosinophils Absolute 0.02 0.00 - 0.40 x10*3/uL    Basophils Absolute 0.05 0.00 - 0.10 x10*3/uL   Comprehensive metabolic panel    Collection Time: 07/10/25  9:29 AM   Result Value Ref Range    Glucose 138 (H) 74 - 99 mg/dL    Sodium 135 (L) 136 - 145 mmol/L    Potassium 3.6 3.5 - 5.3 mmol/L    Chloride 96 (L) 98 - 107 mmol/L    Bicarbonate 29 21 - 32 mmol/L    Anion Gap 14 10 - 20 mmol/L    Urea Nitrogen 15 6 - 23 mg/dL    Creatinine 0.95 0.50 - 1.30 mg/dL    eGFR 80 >60 mL/min/1.73m*2    Calcium 9.5 8.6 - 10.3 mg/dL    Albumin 4.4 3.4 - 5.0 g/dL    Alkaline Phosphatase 68 33 - 136 U/L    Total Protein 7.4 6.4 - 8.2 g/dL    AST 18 9 - 39 U/L    Bilirubin, Total 1.8 (H) 0.0 - 1.2 mg/dL    ALT 20 10 - 52 U/L   Lipase    Collection Time: 07/10/25  9:29 AM   Result Value Ref Range    Lipase 11 9 - 82 U/L   Troponin I, High Sensitivity, Initial    Collection Time: 07/10/25  9:29 AM   Result Value Ref Range    Troponin I, High Sensitivity 58 (HH) 0 - 20 ng/L   Troponin, High Sensitivity, 1 Hour    Collection Time: 07/10/25 10:37 AM   Result Value Ref Range    Troponin I, High Sensitivity 49 (H) 0 - 20 ng/L   Bilirubin, Direct    Collection Time: 07/10/25 10:37 AM   Result Value Ref Range    Bilirubin, Direct 0.3 0.0 - 0.3 mg/dL   CBC    Collection Time: 07/11/25  6:04 AM   Result Value Ref Range    WBC 9.5 4.4 - 11.3 x10*3/uL    nRBC 0.0 0.0 - 0.0 /100 WBCs    RBC 4.26 (L) 4.50 - 5.90 x10*6/uL    Hemoglobin 13.3 (L) 13.5 - 17.5 g/dL    Hematocrit 39.2 (L) 41.0 - 52.0 %    MCV 92 80 - 100 fL    MCH 31.2 26.0 - 34.0 pg    MCHC 33.9 32.0 - 36.0 g/dL    RDW 13.9 11.5 - 14.5 %    Platelets 194  150 - 450 x10*3/uL   Comprehensive Metabolic Panel    Collection Time: 07/11/25  6:04 AM   Result Value Ref Range    Glucose 101 (H) 74 - 99 mg/dL    Sodium 137 136 - 145 mmol/L    Potassium 3.6 3.5 - 5.3 mmol/L    Chloride 99 98 - 107 mmol/L    Bicarbonate 32 21 - 32 mmol/L    Anion Gap 10 10 - 20 mmol/L    Urea Nitrogen 11 6 - 23 mg/dL    Creatinine 0.94 0.50 - 1.30 mg/dL    eGFR 81 >60 mL/min/1.73m*2    Calcium 8.9 8.6 - 10.3 mg/dL    Albumin 3.9 3.4 - 5.0 g/dL    Alkaline Phosphatase 71 33 - 136 U/L    Total Protein 6.5 6.4 - 8.2 g/dL    AST 21 9 - 39 U/L    Bilirubin, Total 1.9 (H) 0.0 - 1.2 mg/dL    ALT 24 10 - 52 U/L   Coagulation Screen    Collection Time: 07/11/25  6:04 AM   Result Value Ref Range    Protime 12.6 (H) 9.8 - 12.4 seconds    INR 1.1 0.9 - 1.1    aPTT 32 26 - 36 seconds   ECG 12 lead    Collection Time: 07/11/25 10:18 AM   Result Value Ref Range    Ventricular Rate 137 BPM    Atrial Rate 138 BPM    CO Interval 76 ms    QRS Duration 90 ms    QT Interval 352 ms    QTC Calculation(Bazett) 532 ms    P Axis -67 degrees    R Axis -7 degrees    QRS Count 22 beats    Q Onset 249 ms    T Offset 425 ms    QTC Fredericia 463 ms   aPTT - baseline    Collection Time: 07/11/25 10:25 AM   Result Value Ref Range    aPTT 35 26 - 36 seconds   Transthoracic Echo Complete    Collection Time: 07/11/25 12:00 PM   Result Value Ref Range    LVOT diam 1.99 cm    LV Biplane EF 35 %    Tricuspid annular plane systolic excursion 2.0 cm    AV mn grad 3 mmHg    LA vol index A/L 20.2 ml/m2    AV pk shirlene 1.26 m/s    LV EF 43 %    RV free wall pk S' 10.81 cm/s    RVSP 24 mmHg    LVIDd 4.54 cm    Aortic Valve Area by Continuity of VTI 1.98 cm2    Aortic Valve Area by Continuity of Peak Velocity 1.75 cm2    AV pk grad 6 mmHg    LV A4C EF 37.5    Heparin Assay, UFH    Collection Time: 07/11/25  3:53 PM   Result Value Ref Range    Heparin Unfractionated 0.9 See Comment Below for Therapeutic Ranges IU/mL   Heparin Assay, UFH     Collection Time: 07/11/25  8:18 PM   Result Value Ref Range    Heparin Unfractionated 0.7 See Comment Below for Therapeutic Ranges IU/mL   Heparin Assay, UFH    Collection Time: 07/12/25 12:07 AM   Result Value Ref Range    Heparin Unfractionated 0.5 See Comment Below for Therapeutic Ranges IU/mL   Basic Metabolic Panel    Collection Time: 07/12/25  5:05 AM   Result Value Ref Range    Glucose 107 (H) 74 - 99 mg/dL    Sodium 141 136 - 145 mmol/L    Potassium 2.7 (LL) 3.5 - 5.3 mmol/L    Chloride 104 98 - 107 mmol/L    Bicarbonate 26 21 - 32 mmol/L    Anion Gap 14 10 - 20 mmol/L    Urea Nitrogen 15 6 - 23 mg/dL    Creatinine 0.91 0.50 - 1.30 mg/dL    eGFR 84 >60 mL/min/1.73m*2    Calcium 6.9 (L) 8.6 - 10.3 mg/dL   CBC    Collection Time: 07/12/25  5:05 AM   Result Value Ref Range    WBC 7.0 4.4 - 11.3 x10*3/uL    nRBC 0.0 0.0 - 0.0 /100 WBCs    RBC 3.71 (L) 4.50 - 5.90 x10*6/uL    Hemoglobin 11.8 (L) 13.5 - 17.5 g/dL    Hematocrit 34.0 (L) 41.0 - 52.0 %    MCV 92 80 - 100 fL    MCH 31.8 26.0 - 34.0 pg    MCHC 34.7 32.0 - 36.0 g/dL    RDW 13.6 11.5 - 14.5 %    Platelets 175 150 - 450 x10*3/uL   Magnesium    Collection Time: 07/12/25  5:05 AM   Result Value Ref Range    Magnesium 1.64 1.60 - 2.40 mg/dL   Heparin Assay, UFH    Collection Time: 07/12/25  5:05 AM   Result Value Ref Range    Heparin Unfractionated 0.5 See Comment Below for Therapeutic Ranges IU/mL   CBC    Collection Time: 07/13/25  4:08 AM   Result Value Ref Range    WBC 8.0 4.4 - 11.3 x10*3/uL    nRBC 0.0 0.0 - 0.0 /100 WBCs    RBC 4.12 (L) 4.50 - 5.90 x10*6/uL    Hemoglobin 12.9 (L) 13.5 - 17.5 g/dL    Hematocrit 38.0 (L) 41.0 - 52.0 %    MCV 92 80 - 100 fL    MCH 31.3 26.0 - 34.0 pg    MCHC 33.9 32.0 - 36.0 g/dL    RDW 13.6 11.5 - 14.5 %    Platelets 207 150 - 450 x10*3/uL   Basic Metabolic Panel    Collection Time: 07/13/25  4:08 AM   Result Value Ref Range    Glucose 111 (H) 74 - 99 mg/dL    Sodium 137 136 - 145 mmol/L    Potassium 3.5 3.5 - 5.3  mmol/L    Chloride 104 98 - 107 mmol/L    Bicarbonate 24 21 - 32 mmol/L    Anion Gap 13 10 - 20 mmol/L    Urea Nitrogen 13 6 - 23 mg/dL    Creatinine 0.97 0.50 - 1.30 mg/dL    eGFR 78 >60 mL/min/1.73m*2    Calcium 8.7 8.6 - 10.3 mg/dL   Magnesium    Collection Time: 07/13/25  4:08 AM   Result Value Ref Range    Magnesium 2.20 1.60 - 2.40 mg/dL   Heparin Assay, UFH    Collection Time: 07/13/25  4:08 AM   Result Value Ref Range    Heparin Unfractionated 0.7 See Comment Below for Therapeutic Ranges IU/mL   Basic Metabolic Panel    Collection Time: 07/14/25  4:44 AM   Result Value Ref Range    Glucose 101 (H) 74 - 99 mg/dL    Sodium 137 136 - 145 mmol/L    Potassium 3.7 3.5 - 5.3 mmol/L    Chloride 102 98 - 107 mmol/L    Bicarbonate 24 21 - 32 mmol/L    Anion Gap 15 10 - 20 mmol/L    Urea Nitrogen 12 6 - 23 mg/dL    Creatinine 0.97 0.50 - 1.30 mg/dL    eGFR 78 >60 mL/min/1.73m*2    Calcium 9.1 8.6 - 10.3 mg/dL   CBC    Collection Time: 07/14/25  4:44 AM   Result Value Ref Range    WBC 6.5 4.4 - 11.3 x10*3/uL    nRBC 0.0 0.0 - 0.0 /100 WBCs    RBC 4.26 (L) 4.50 - 5.90 x10*6/uL    Hemoglobin 13.5 13.5 - 17.5 g/dL    Hematocrit 40.7 (L) 41.0 - 52.0 %    MCV 96 80 - 100 fL    MCH 31.7 26.0 - 34.0 pg    MCHC 33.2 32.0 - 36.0 g/dL    RDW 13.6 11.5 - 14.5 %    Platelets 235 150 - 450 x10*3/uL   Magnesium    Collection Time: 07/14/25  4:44 AM   Result Value Ref Range    Magnesium 2.04 1.60 - 2.40 mg/dL   Heparin Assay, UFH    Collection Time: 07/14/25  4:44 AM   Result Value Ref Range    Heparin Unfractionated <0.1 See Comment Below for Therapeutic Ranges IU/mL   Heparin Assay, UFH    Collection Time: 07/14/25  7:38 PM   Result Value Ref Range    Heparin Unfractionated 0.5 See Comment Below for Therapeutic Ranges IU/mL   CBC    Collection Time: 07/14/25 11:58 PM   Result Value Ref Range    WBC 8.0 4.4 - 11.3 x10*3/uL    nRBC 0.0 0.0 - 0.0 /100 WBCs    RBC 4.20 (L) 4.50 - 5.90 x10*6/uL    Hemoglobin 13.4 (L) 13.5 - 17.5 g/dL     Hematocrit 37.9 (L) 41.0 - 52.0 %    MCV 90 80 - 100 fL    MCH 31.9 26.0 - 34.0 pg    MCHC 35.4 32.0 - 36.0 g/dL    RDW 13.4 11.5 - 14.5 %    Platelets 250 150 - 450 x10*3/uL   Heparin Assay, UFH    Collection Time: 07/14/25 11:58 PM   Result Value Ref Range    Heparin Unfractionated 0.5 See Comment Below for Therapeutic Ranges IU/mL   Basic Metabolic Panel    Collection Time: 07/15/25  4:44 AM   Result Value Ref Range    Glucose 109 (H) 74 - 99 mg/dL    Sodium 135 (L) 136 - 145 mmol/L    Potassium 3.2 (L) 3.5 - 5.3 mmol/L    Chloride 101 98 - 107 mmol/L    Bicarbonate 26 21 - 32 mmol/L    Anion Gap 11 10 - 20 mmol/L    Urea Nitrogen 11 6 - 23 mg/dL    Creatinine 1.07 0.50 - 1.30 mg/dL    eGFR 69 >60 mL/min/1.73m*2    Calcium 9.1 8.6 - 10.3 mg/dL   Magnesium    Collection Time: 07/15/25  4:44 AM   Result Value Ref Range    Magnesium 1.95 1.60 - 2.40 mg/dL   Heparin Assay, UFH    Collection Time: 07/15/25  4:44 AM   Result Value Ref Range    Heparin Unfractionated 0.6 See Comment Below for Therapeutic Ranges IU/mL   Magnesium    Collection Time: 07/16/25  4:03 AM   Result Value Ref Range    Magnesium 1.80 1.60 - 2.40 mg/dL   Renal Function Panel    Collection Time: 07/16/25  4:03 AM   Result Value Ref Range    Glucose 117 (H) 74 - 99 mg/dL    Sodium 135 (L) 136 - 145 mmol/L    Potassium 3.6 3.5 - 5.3 mmol/L    Chloride 104 98 - 107 mmol/L    Bicarbonate 24 21 - 32 mmol/L    Anion Gap 11 10 - 20 mmol/L    Urea Nitrogen 14 6 - 23 mg/dL    Creatinine 1.03 0.50 - 1.30 mg/dL    eGFR 73 >60 mL/min/1.73m*2    Calcium 8.4 (L) 8.6 - 10.3 mg/dL    Phosphorus 4.1 2.5 - 4.9 mg/dL    Albumin 3.7 3.4 - 5.0 g/dL   CBC    Collection Time: 07/16/25  4:03 AM   Result Value Ref Range    WBC 8.2 4.4 - 11.3 x10*3/uL    nRBC 0.0 0.0 - 0.0 /100 WBCs    RBC 4.24 (L) 4.50 - 5.90 x10*6/uL    Hemoglobin 13.2 (L) 13.5 - 17.5 g/dL    Hematocrit 39.5 (L) 41.0 - 52.0 %    MCV 93 80 - 100 fL    MCH 31.1 26.0 - 34.0 pg    MCHC 33.4 32.0 - 36.0 g/dL     RDW 13.7 11.5 - 14.5 %    Platelets 257 150 - 450 x10*3/uL   Comprehensive Metabolic Panel    Collection Time: 07/23/25  7:43 AM   Result Value Ref Range    GLUCOSE 111 (H) 65 - 99 mg/dL    UREA NITROGEN (BUN) 12 7 - 25 mg/dL    CREATININE 0.90 0.70 - 1.22 mg/dL    EGFR 85 > OR = 60 mL/min/1.73m2    SODIUM 139 135 - 146 mmol/L    POTASSIUM 4.4 3.5 - 5.3 mmol/L    CHLORIDE 101 98 - 110 mmol/L    CARBON DIOXIDE 29 20 - 32 mmol/L    ELECTROLYTE BALANCE 9 7 - 17 mmol/L (calc)    CALCIUM 9.8 8.6 - 10.3 mg/dL    PROTEIN, TOTAL 6.8 6.1 - 8.1 g/dL    ALBUMIN 4.4 3.6 - 5.1 g/dL    BILIRUBIN, TOTAL 0.8 0.2 - 1.2 mg/dL    ALKALINE PHOSPHATASE 67 35 - 144 U/L    AST 40 (H) 10 - 35 U/L    ALT 81 (H) 9 - 46 U/L   Hemoglobin A1C    Collection Time: 07/23/25  7:43 AM   Result Value Ref Range    HEMOGLOBIN A1c 6.3 (H) <5.7 %    eAG (mg/dL) 134 mg/dL    eAG (mmol/L) 7.4 mmol/L   Lipid Panel    Collection Time: 07/23/25  7:43 AM   Result Value Ref Range    CHOLESTEROL, TOTAL 125 <200 mg/dL    HDL CHOLESTEROL 45 > OR = 40 mg/dL    TRIGLYCERIDES 118 <150 mg/dL    LDL-CHOLESTEROL 60 mg/dL (calc)    CHOL/HDLC RATIO 2.8 <5.0 (calc)    NON HDL CHOLESTEROL 80 <130 mg/dL (calc)     Recent labs reviewed overall numbers are stable LFTs are minimally elevated most likely secondary to current episode of cholecystitis.  Continue to follow.    Follow through with cardiology    Patient follow-up through a general surgeon for plans of cholecystectomy    Call with any questions or concerns  Return to our office in 3-1/2 months with repeat fasting labs    The complexity of medical decision making for this patient's transitional care is moderate.    Assessment/Plan   Problem List Items Addressed This Visit           ICD-10-CM    Benign essential hypertension I10    Stable continue treatment         Relevant Orders    CBC    Comprehensive Metabolic Panel    Follow Up In Primary Care - Established    CAD (coronary artery disease) - Primary I25.10     Recent stress test and heart cath reviewed with patient medical management is being recommended per cardiology         Diabetes mellitus type 2, diet-controlled E11.9    A1c stable at 6.3% continue dietary modification         Relevant Orders    Comprehensive Metabolic Panel    Hemoglobin A1C    Follow Up In Primary Care - Established    Calculus of gallbladder with acute cholecystitis without obstruction K80.00    Patient currently completing course of oral antibiotics with plans of cholecystectomy in a few weeks.          Other Visit Diagnoses         Codes      Mixed hyperlipidemia     E78.2    Relevant Orders    Comprehensive Metabolic Panel    Lipid Panel    Follow Up In Primary Care - Established

## 2025-07-29 ENCOUNTER — PATIENT OUTREACH (OUTPATIENT)
Dept: PRIMARY CARE | Facility: CLINIC | Age: 80
End: 2025-07-29
Payer: MEDICARE

## 2025-07-30 ENCOUNTER — OFFICE VISIT (OUTPATIENT)
Dept: CARDIOLOGY | Facility: HOSPITAL | Age: 80
End: 2025-07-30
Payer: MEDICARE

## 2025-07-30 VITALS
DIASTOLIC BLOOD PRESSURE: 62 MMHG | WEIGHT: 198 LBS | HEIGHT: 66 IN | HEART RATE: 62 BPM | BODY MASS INDEX: 31.82 KG/M2 | SYSTOLIC BLOOD PRESSURE: 128 MMHG

## 2025-07-30 DIAGNOSIS — I10 BENIGN ESSENTIAL HYPERTENSION: Primary | ICD-10-CM

## 2025-07-30 DIAGNOSIS — I48.0 PAF (PAROXYSMAL ATRIAL FIBRILLATION) (MULTI): ICD-10-CM

## 2025-07-30 DIAGNOSIS — I25.10 CORONARY ARTERY DISEASE INVOLVING NATIVE CORONARY ARTERY OF NATIVE HEART WITHOUT ANGINA PECTORIS: ICD-10-CM

## 2025-07-30 PROCEDURE — 1111F DSCHRG MED/CURRENT MED MERGE: CPT | Performed by: PHYSICIAN ASSISTANT

## 2025-07-30 PROCEDURE — 3074F SYST BP LT 130 MM HG: CPT | Performed by: PHYSICIAN ASSISTANT

## 2025-07-30 PROCEDURE — 1159F MED LIST DOCD IN RCRD: CPT | Performed by: PHYSICIAN ASSISTANT

## 2025-07-30 PROCEDURE — 99214 OFFICE O/P EST MOD 30 MIN: CPT | Performed by: PHYSICIAN ASSISTANT

## 2025-07-30 PROCEDURE — 99212 OFFICE O/P EST SF 10 MIN: CPT | Performed by: PHYSICIAN ASSISTANT

## 2025-07-30 PROCEDURE — 3078F DIAST BP <80 MM HG: CPT | Performed by: PHYSICIAN ASSISTANT

## 2025-07-30 ASSESSMENT — ENCOUNTER SYMPTOMS
NAUSEA: 0
VOMITING: 0
SHORTNESS OF BREATH: 0
WHEEZING: 0
DYSURIA: 0
FEVER: 0
WEAKNESS: 0
ABDOMINAL PAIN: 0
PALPITATIONS: 0
ORTHOPNEA: 0
DIARRHEA: 0

## 2025-07-30 NOTE — PROGRESS NOTES
Cardiology Follow Up  Chief Complaint:   Follow up recent hospitalization.       History Of Present Illness:    Neftali Schmitt is a 80 y.o. male who presented to the emergency department with right upper quadrant abdominal pain.  Patient states that around 2 AM he woke up with right-sided pain.  He drink some ginger ale with some improvement of his symptoms but as it was not completely resolved he decided to come to the emergency department for evaluation.  Patient was subsequently found to have evidence of acute cholecystitis.  Patient was seen by general surgery who recommended cholecystostomy tube and antibiotic therapy followed by outpatient surgery in 4 to 6 weeks.  Patient declined undergoing cholecystostomy tube.  Cardiology was consulted for abnormal troponin.  When I went to evaluate the patient he was noted to be tachycardic with telemetry showing what appeared to be atrial flutter with heart rate of 140 bpm.  ECG done this morning showed atrial flutter with a heart rate of 137 bpm.  Started the patient on a heparin infusion and gave IV metoprolol.  Telemetry now showing atrial fibrillation with heart rate in the 120s.  BMP showing a serum sodium of 137, serum potassium 3.6, serum creatinine is 0.94.  INR was 1.1.  Troponin was 58-49.  CBC showed hemoglobin of 13.3. Chest x-ray done 7/10/2025 showed opacity in the right posterior costophrenic angle, possible small enlarging pleural effusion or focal consolidation.  CT scan of the abdomen/pelvis done 7/10/2025 showed distended gallbladder with questionable underlying gallstones, gallbladder wall thickening and mild pericholecystic haziness suggestive of acute cholecystitis, mild biliary dilatation with the common bile duct measuring up to 10 mm.  Ultrasound of the gallbladder done 7/10/2025 showed gallbladder sludge with borderline gallbladder wall thickening, no cholelithiasis, increased echogenicity of the right renal cortex, no hydronephrosis, renal  cyst, diffuse hepatic steatosis.  Nuclear medicine scan done 7/10/2025 was abnormal with findings consistent with acute cholecystitis.  During my exam patient was resting in a bedside chair.      Subjective Data:  Patient telemetry shows that he has converted back to sinus rhythm.  Patient has no cardiac symptoms such as chest pain palpitations or shortness of breath.  If he pushes on his belly hard enough still some mild right upper quadrant pain but while I am talking to him he is eating pancakes and sausage with no GI complaints.  He remains on a heparin infusion and plan is for stress testing on Monday.  7-13-25: Patient sitting up in chair.  Remains on heparin infusion.  No cardiac complaints or concerns.  Telemetry sinus bradycardia 58 plan for stress test in the morning.  7-14-25:  Patient seen in stress lab.  No cardiac complaints.  No GI complaints.  Reviewed briefly with surgery.  If stress is OK he can be discharged however if abnormal will need to discuss LHC.    7-15-25: Patient resting comfortably.  Stress testing yesterday shows lateral ischemia.  Review of old records show that the patient had remote bypass surgery in 2007 with LIMA to LAD vein graft to the obtuse marginal and vein graft to the PDA.  No cardiac symptoms whatsoever.  Telemetry shows sinus rhythm 65.  He is n.p.o. and after discussing the stress results with him he is agreeable to proceed with left heart catheterization.  7-16-25: Patient resting comfortably.  Denies chest pain shortness of breath or palpitations.  He had no further atrial fibrillation since yesterday morning at which time he was hypokalemic.  Right femoral access is unremarkable.  He has been started on Eliquis.  Heart catheter results reviewed and discussed with Dr. Mata regarding his outpatient cholecystectomy and that we will be able to hold Eliquis 48 hours prior to the gallbladder removal.  Telemetry currently sinus rhythm.   7-30-25:  Feeling well since  "admit.  No cardiac complaints.  No GI complaints or gallbladder pain. Having gallbladder out 8-22-25.  Taking meds as prescribed. No palpitations.       Last Recorded Vitals:  Vitals:    07/30/25 1254   BP: 128/62   Pulse: 62   Weight: 89.8 kg (198 lb)   Height: 1.676 m (5' 6\")       Past Medical History:  He has a past medical history of Abnormal auditory perception of both ears (02/09/2023), GERD (gastroesophageal reflux disease), Hyperlipidemia, Hypertension, Immunization not carried out because of patient refusal, Personal history of other diseases of the circulatory system, Personal history of other diseases of the musculoskeletal system and connective tissue, Personal history of other drug therapy, and Severe obesity (BMI 35.0-39.9) with comorbidity (Multi) (09/26/2023).    Past Surgical History:  He has a past surgical history that includes Other surgical history (07/17/2020); CT angio neck (02/21/2023); Cardiac catheterization (N/A, 07/15/2025); and Hernia repair.      Social History:  He reports that he quit smoking about 35 years ago. His smoking use included cigarettes. He has been exposed to tobacco smoke. He has never used smokeless tobacco. He reports current alcohol use of about 2.0 standard drinks of alcohol per week. He reports that he does not use drugs.    Family History:  Family History[1]     Allergies:  Losartan    Outpatient Medications:  Current Outpatient Medications   Medication Instructions    amLODIPine (NORVASC) 10 mg, oral, Daily    aspirin 81 mg EC tablet     atorvastatin (LIPITOR) 80 mg, oral, Daily    chlorthalidone (HYGROTON) 25 mg, oral, Daily    cholecalciferol (VITAMIN D3) 5,000 Units, Daily    Eliquis 5 mg, oral, 2 times daily    ezetimibe (ZETIA) 10 mg, oral, Daily    meclizine (ANTIVERT) 25 mg, oral, 3 times daily PRN    metoprolol succinate XL (TOPROL-XL) 25 mg, oral, Daily    pantoprazole (PROTONIX) 40 mg, oral, Daily, Do not crush, chew, or split.    potassium chloride CR " (Klor-Con M20) 20 mEq ER tablet 20 mEq, oral, Daily, Do not crush or chew.     Review of Systems   Constitutional: Negative for fever and malaise/fatigue.   Cardiovascular:  Negative for chest pain, orthopnea and palpitations.   Respiratory:  Negative for shortness of breath and wheezing.    Skin:  Negative for itching and rash.   Gastrointestinal:  Negative for abdominal pain, diarrhea, nausea and vomiting.   Genitourinary:  Negative for dysuria.   Neurological:  Negative for weakness.      Physical Exam  Constitutional:       General: He is not in acute distress.     Appearance: Normal appearance.   HENT:      Mouth/Throat:      Mouth: Mucous membranes are moist.   Neck:      Comments: No JVD  Cardiovascular:      Rate and Rhythm: Normal rate and regular rhythm.      Heart sounds: Normal heart sounds. No murmur heard.     Comments: SR by exam  Abdominal:      General: Abdomen is flat. Bowel sounds are normal.      Palpations: Abdomen is soft.     Musculoskeletal:         General: No swelling.     Skin:     General: Skin is warm and dry.     Neurological:      Mental Status: He is alert and oriented to person, place, and time.     Psychiatric:         Mood and Affect: Mood normal.           Last Labs:  CBC -  Lab Results   Component Value Date    WBC 8.2 07/16/2025    WBC 7.7 03/18/2025    HGB 13.2 (L) 07/16/2025    HGB 14.4 03/18/2025    HCT 39.5 (L) 07/16/2025    HCT 44.0 03/18/2025    MCV 93 07/16/2025    MCV 95.0 03/18/2025     07/16/2025     03/18/2025       CMP -  Lab Results   Component Value Date    CALCIUM 9.8 07/23/2025    PHOS 4.1 07/16/2025    PROT 6.8 07/23/2025    ALBUMIN 4.4 07/23/2025    AST 40 (H) 07/23/2025    ALT 81 (H) 07/23/2025    ALKPHOS 67 07/23/2025    BILITOT 0.8 07/23/2025       LIPID PANEL -   Lab Results   Component Value Date    CHOL 125 07/23/2025    TRIG 118 07/23/2025    HDL 45 07/23/2025    CHHDL 2.8 07/23/2025    LDLF 72 09/20/2023    VLDL 19 11/14/2024    NHDL 80  07/23/2025       RENAL FUNCTION PANEL -   Lab Results   Component Value Date    GLUCOSE 111 (H) 07/23/2025     07/23/2025    K 4.4 07/23/2025     07/23/2025    CO2 29 07/23/2025    ANIONGAP 9 07/23/2025    BUN 12 07/23/2025    CREATININE 0.90 07/23/2025    GFRMALE 77 09/20/2023    CALCIUM 9.8 07/23/2025    PHOS 4.1 07/16/2025    ALBUMIN 4.4 07/23/2025        Lab Results   Component Value Date    HGBA1C 6.3 (H) 07/23/2025       Last Cardiology Tests:    Echo:  Transthoracic Echo Complete 07/11/2025--CONCLUSIONS:   1. Poorly visualized anatomical structures due to suboptimal image quality.   2. The left ventricular systolic function is mildly decreased with a visually estimated ejection fraction of 40-45%.   3. Left ventricular diastolic filling was indeterminate.   4. Mild to moderate mitral valve regurgitation.   5. The Doppler estimated RVSP is within normal limits at 24 mmHg.    Ejection Fractions:  EF   Date/Time Value Ref Range Status   07/11/2025 12:00 PM 43 %        Cath:  Cardiac Catheterization Procedure 07/15/2025--CONCLUSIONS:   1. Severe triple native vessel disease (Proximal LAD and RCA , ostial LCX ). Patent LIMA-LAD and SVG-rPDA.   2. Occluded SVG-OM with competitive from L-L and R-L collaterals.   3. Optimize medical therapy, if he develops symptoms will plan on LCX  PCI.    Stress Test:  Nuclear Stress Test 07/14/2025--IMPRESSION:  Medium-sized area of reversible perfusion defect, consistent with  ischemia in left circumflex territory. Mild left ventricular systolic  dysfunction on post stress gated imaging.  1. Pertinent findings if any on low-dose non gated CT images-severe  coronary artery calcification. Calcification of the thoracic aorta.  Small left-sided pleural effusion.      Lab review: I have personally reviewed the laboratory result(s)     Assessment/Plan   Problem List Items Addressed This Visit           ICD-10-CM       Cardiac and Vasculature    Benign essential  hypertension - Primary I10    CAD (coronary artery disease) I25.10    PAF (paroxysmal atrial fibrillation) (Multi) I48.0   CAD--during hospitalization patient never had any chest discomfort.  Elevated enzymes were revealed because of acute cholecystitis.  Subsequently had echo showing mild LV dysfunction with EF of 40 to 45% we then proceeded with stress test which was abnormal showing areas of ischemia.  Heart catheterization performed showed severe native artery coronary disease with patent LIMA to LAD and patent vein graft to the PDA however the patient was noted to have occluded vein graft to the OM with left to left and right to left collaterals.  Recommendation was that if he should develop any symptoms could consider PCI of the  of native circumflex.  He has not had any concerning symptoms since his discharge.  For now continue current medical therapy his blood pressures are well-controlled.  Will continue amlodipine 10 mg daily, aspirin 81 mg daily, Lipitor 80 mg daily, chlorthalidone 25 mg daily, Zetia 10 mg daily, metoprolol 25 mg daily.  Mild LV dysfunction/heart failure with midrange EF--no signs or symptoms of CHF.  No shortness of breath orthopnea or peripheral edema.  Blood pressures are well-controlled.  He is on chlorthalidone.  He does require potassium supplementation with that.  Hypertension--again blood pressure is well-controlled as noted in #1  PAF--on admission had an episode of atrial fibrillation converted to sinus rhythm and had 1 further episode of A-fib.  Since being home no palpitations and we will continue the 25 mg of metoprolol and he is anticoagulated with Eliquis 5 mg twice daily.  He is having laparoscopic cholecystectomy and his last dose of Eliquis will be the evening of August 18.  Preoperative risk assessment--again based on the fact that he has severe CAD and LV dysfunction his RCRI score is 2 indicating increased cardiovascular risk of major adverse cardiac events.  As  explained to the patient he verbalizes understanding associated with his risk but still would like to proceed with having his gallbladder removed on August 21.  No further cardiac testing is necessary since we just did all this testing.  We would like him to continue his aspirin uninterrupted but again his last dose of Eliquis will be the evening dose on August 18.  The morning of the procedure while n.p.o. the patient should take his amlodipine and metoprolol with a few sips of water.  Overall patient stable from cardiac standpoint with no new cardiac complaints or concerns.  Will arrange follow-up with Dr. Stewart in 3 months time.  Should to be any change in cardiorespiratory status he is instructed to contact the office.      Eze Langston PA-C  7/30/2025  1:04 PM         [1]   Family History  Problem Relation Name Age of Onset    Cancer Father

## 2025-07-30 NOTE — PATIENT INSTRUCTIONS
Your last dose of the blood thinner/eliquis will be the evening dose 8-18-25.    On the day of your surgery even when they tell you not to eat or drink please take the amlodipine and metprolol.    We will arrange for follow up with Dr. Stewart in 3 months.

## 2025-08-05 ENCOUNTER — TELEPHONE (OUTPATIENT)
Dept: PREADMISSION TESTING | Facility: HOSPITAL | Age: 80
End: 2025-08-05

## 2025-08-14 DIAGNOSIS — I48.0 PAF (PAROXYSMAL ATRIAL FIBRILLATION) (MULTI): ICD-10-CM

## 2025-08-18 ENCOUNTER — TELEPHONE (OUTPATIENT)
Dept: PRIMARY CARE | Facility: CLINIC | Age: 80
End: 2025-08-18
Payer: MEDICARE

## 2025-08-18 DIAGNOSIS — I10 BENIGN ESSENTIAL HYPERTENSION: ICD-10-CM

## 2025-08-18 DIAGNOSIS — K21.9 GASTROESOPHAGEAL REFLUX DISEASE, UNSPECIFIED WHETHER ESOPHAGITIS PRESENT: ICD-10-CM

## 2025-08-18 RX ORDER — AMLODIPINE BESYLATE 10 MG/1
10 TABLET ORAL DAILY
Qty: 90 TABLET | Refills: 1 | Status: SHIPPED | OUTPATIENT
Start: 2025-08-18 | End: 2026-08-18

## 2025-08-18 RX ORDER — PANTOPRAZOLE SODIUM 40 MG/1
40 TABLET, DELAYED RELEASE ORAL DAILY
Qty: 30 TABLET | Refills: 1 | Status: SHIPPED | OUTPATIENT
Start: 2025-08-18 | End: 2025-10-17

## 2025-08-18 RX ORDER — METOPROLOL SUCCINATE 25 MG/1
25 TABLET, EXTENDED RELEASE ORAL DAILY
Qty: 90 TABLET | Refills: 1 | Status: SHIPPED | OUTPATIENT
Start: 2025-08-18 | End: 2026-08-18

## 2025-08-20 ENCOUNTER — PREP FOR PROCEDURE (OUTPATIENT)
Dept: SURGERY | Facility: HOSPITAL | Age: 80
End: 2025-08-20
Payer: MEDICARE

## 2025-08-21 ENCOUNTER — PHARMACY VISIT (OUTPATIENT)
Dept: PHARMACY | Facility: CLINIC | Age: 80
End: 2025-08-21
Payer: COMMERCIAL

## 2025-08-21 ENCOUNTER — ANESTHESIA (OUTPATIENT)
Dept: OPERATING ROOM | Facility: HOSPITAL | Age: 80
End: 2025-08-21
Payer: MEDICARE

## 2025-08-21 ENCOUNTER — HOSPITAL ENCOUNTER (OUTPATIENT)
Facility: HOSPITAL | Age: 80
Setting detail: OUTPATIENT SURGERY
Discharge: HOME | End: 2025-08-21
Attending: SURGERY | Admitting: SURGERY
Payer: MEDICARE

## 2025-08-21 ENCOUNTER — ANESTHESIA EVENT (OUTPATIENT)
Dept: OPERATING ROOM | Facility: HOSPITAL | Age: 80
End: 2025-08-21
Payer: MEDICARE

## 2025-08-21 PROCEDURE — RXMED WILLOW AMBULATORY MEDICATION CHARGE

## 2025-08-21 PROCEDURE — 2500000004 HC RX 250 GENERAL PHARMACY W/ HCPCS (ALT 636 FOR OP/ED): Performed by: SURGERY

## 2025-08-21 PROCEDURE — 2500000004 HC RX 250 GENERAL PHARMACY W/ HCPCS (ALT 636 FOR OP/ED): Performed by: NURSE ANESTHETIST, CERTIFIED REGISTERED

## 2025-08-21 RX ORDER — ONDANSETRON HYDROCHLORIDE 2 MG/ML
INJECTION, SOLUTION INTRAVENOUS AS NEEDED
Status: DISCONTINUED | OUTPATIENT
Start: 2025-08-21 | End: 2025-08-21

## 2025-08-21 RX ORDER — PHENYLEPHRINE HCL IN 0.9% NACL 1 MG/10 ML
SYRINGE (ML) INTRAVENOUS AS NEEDED
Status: DISCONTINUED | OUTPATIENT
Start: 2025-08-21 | End: 2025-08-21

## 2025-08-21 RX ORDER — FENTANYL CITRATE 50 UG/ML
INJECTION, SOLUTION INTRAMUSCULAR; INTRAVENOUS AS NEEDED
Status: DISCONTINUED | OUTPATIENT
Start: 2025-08-21 | End: 2025-08-21

## 2025-08-21 RX ORDER — ROCURONIUM BROMIDE 10 MG/ML
INJECTION, SOLUTION INTRAVENOUS AS NEEDED
Status: DISCONTINUED | OUTPATIENT
Start: 2025-08-21 | End: 2025-08-21

## 2025-08-21 RX ORDER — NALOXONE HYDROCHLORIDE 4 MG/.1ML
SPRAY NASAL
Qty: 2 EACH | Refills: 0 | OUTPATIENT
Start: 2025-08-21

## 2025-08-21 RX ORDER — PROPOFOL 10 MG/ML
INJECTION, EMULSION INTRAVENOUS AS NEEDED
Status: DISCONTINUED | OUTPATIENT
Start: 2025-08-21 | End: 2025-08-21

## 2025-08-21 RX ORDER — SODIUM CHLORIDE, SODIUM LACTATE, POTASSIUM CHLORIDE, CALCIUM CHLORIDE 600; 310; 30; 20 MG/100ML; MG/100ML; MG/100ML; MG/100ML
INJECTION, SOLUTION INTRAVENOUS CONTINUOUS PRN
Status: DISCONTINUED | OUTPATIENT
Start: 2025-08-21 | End: 2025-08-21

## 2025-08-21 RX ORDER — LIDOCAINE HYDROCHLORIDE 20 MG/ML
INJECTION, SOLUTION INFILTRATION; PERINEURAL AS NEEDED
Status: DISCONTINUED | OUTPATIENT
Start: 2025-08-21 | End: 2025-08-21

## 2025-08-21 RX ADMIN — ROCURONIUM BROMIDE 40 MG: 10 INJECTION, SOLUTION INTRAVENOUS at 08:05

## 2025-08-21 RX ADMIN — FENTANYL CITRATE 50 MCG: 50 INJECTION INTRAMUSCULAR; INTRAVENOUS at 08:40

## 2025-08-21 RX ADMIN — PROPOFOL 50 MG: 10 INJECTION, EMULSION INTRAVENOUS at 08:06

## 2025-08-21 RX ADMIN — Medication 100 MCG: at 09:00

## 2025-08-21 RX ADMIN — ONDANSETRON 4 MG: 2 INJECTION, SOLUTION INTRAMUSCULAR; INTRAVENOUS at 09:21

## 2025-08-21 RX ADMIN — SUGAMMADEX 200 MG: 100 INJECTION, SOLUTION INTRAVENOUS at 09:31

## 2025-08-21 RX ADMIN — LIDOCAINE HYDROCHLORIDE 60 MG: 20 INJECTION, SOLUTION INFILTRATION; PERINEURAL at 08:05

## 2025-08-21 RX ADMIN — SODIUM CHLORIDE, POTASSIUM CHLORIDE, SODIUM LACTATE AND CALCIUM CHLORIDE: 600; 310; 30; 20 INJECTION, SOLUTION INTRAVENOUS at 07:56

## 2025-08-21 RX ADMIN — FENTANYL CITRATE 50 MCG: 50 INJECTION INTRAMUSCULAR; INTRAVENOUS at 08:02

## 2025-08-21 RX ADMIN — CEFAZOLIN SODIUM 2 G: 2 SOLUTION INTRAVENOUS at 07:57

## 2025-08-21 RX ADMIN — PROPOFOL 100 MG: 10 INJECTION, EMULSION INTRAVENOUS at 08:05

## 2025-08-21 RX ADMIN — Medication 100 MCG: at 08:27

## 2025-08-21 SDOH — HEALTH STABILITY: MENTAL HEALTH: CURRENT SMOKER: 0

## 2025-08-21 ASSESSMENT — PAIN DESCRIPTION - DESCRIPTORS
DESCRIPTORS: DISCOMFORT

## 2025-08-21 ASSESSMENT — PAIN - FUNCTIONAL ASSESSMENT
PAIN_FUNCTIONAL_ASSESSMENT: WONG-BAKER FACES
PAIN_FUNCTIONAL_ASSESSMENT: 0-10
PAIN_FUNCTIONAL_ASSESSMENT: WONG-BAKER FACES
PAIN_FUNCTIONAL_ASSESSMENT: 0-10

## 2025-08-21 ASSESSMENT — PAIN SCALES - GENERAL
PAINLEVEL_OUTOF10: 5 - MODERATE PAIN
PAINLEVEL_OUTOF10: 6
PAINLEVEL_OUTOF10: 0 - NO PAIN
PAINLEVEL_OUTOF10: 7
PAINLEVEL_OUTOF10: 6
PAINLEVEL_OUTOF10: 0 - NO PAIN
PAIN_LEVEL: 2
PAINLEVEL_OUTOF10: 4
PAINLEVEL_OUTOF10: 2
PAINLEVEL_OUTOF10: 0 - NO PAIN
PAINLEVEL_OUTOF10: 4

## 2025-08-21 ASSESSMENT — PAIN DESCRIPTION - LOCATION
LOCATION: ABDOMEN

## 2025-08-21 ASSESSMENT — PAIN DESCRIPTION - ORIENTATION
ORIENTATION: MID;UPPER

## 2025-08-22 DIAGNOSIS — I10 BENIGN ESSENTIAL HYPERTENSION: ICD-10-CM

## 2025-08-26 ENCOUNTER — TELEPHONE (OUTPATIENT)
Dept: CARDIOLOGY | Facility: HOSPITAL | Age: 80
End: 2025-08-26
Payer: MEDICARE

## 2025-09-05 ENCOUNTER — APPOINTMENT (OUTPATIENT)
Dept: SURGERY | Facility: CLINIC | Age: 80
End: 2025-09-05
Payer: MEDICARE

## 2025-09-05 RX ORDER — EZETIMIBE 10 MG/1
10 TABLET ORAL DAILY
Qty: 90 TABLET | Refills: 1 | OUTPATIENT
Start: 2025-09-05

## 2025-09-09 ENCOUNTER — APPOINTMENT (OUTPATIENT)
Facility: CLINIC | Age: 80
End: 2025-09-09
Payer: MEDICARE

## 2025-10-30 ENCOUNTER — APPOINTMENT (OUTPATIENT)
Dept: PRIMARY CARE | Facility: CLINIC | Age: 80
End: 2025-10-30
Payer: MEDICARE

## (undated) DEVICE — APPLICATOR, CHLORAPREP, W/ORANGE TINT, 26ML

## (undated) DEVICE — PADDING, CAST, SOFT, 2 X 4YDS

## (undated) DEVICE — SOLUTION, IRRIGATION, SODIUM CHLORIDE 0.9%, 1000 ML, POUR BOTTLE

## (undated) DEVICE — NEEDLE, SAFETY, 22 G X 1.5 IN

## (undated) DEVICE — TOWEL PACK, STERILE, 4/PACK, BLUE

## (undated) DEVICE — GUIDEWIRE, AMPLATZ SUPER STIFF, STR, .035 IN X 75CM

## (undated) DEVICE — DRESSING, GAUZE, PETROLATUM, STRIP, XEROFORM, 1 X 8 IN, STERILE

## (undated) DEVICE — BLADE, SMARTRELEASE, ONYX, CARPAL TUNNEL, DISP

## (undated) DEVICE — SHEATH, PINNACLE, 10 CM,  6FR INTRODUCER, 6FR DIA, 2.5 CM DIALATOR

## (undated) DEVICE — GLOVE, PROTEXIS PI CLASSIC, SZ-8.0, PF, PF, LF

## (undated) DEVICE — CATHETER, DIAGNOSTIC, DXTERITY, 6FR JR 4.0, 100CM

## (undated) DEVICE — BANDAGE, ELASTIC, PREMIUM, SELF-CLOSE, 2 IN X 5 YD, STERILE

## (undated) DEVICE — GUIDEWIRE, INQWIRE, 3MM J, .035 X 210CM, FIXED

## (undated) DEVICE — ACCESS KIT, S-MAK MINI, 4FR 10CM 0.018IN 40CM, NT/PT, ECHO ENHANCE NEEDLE

## (undated) DEVICE — COVER HANDLE LIGHT, STERIS, BLUE, STERILE

## (undated) DEVICE — SYRINGE, 10 CC, LUER LOCK

## (undated) DEVICE — CLOSURE DEVICE, VASCULAR, ANGIO-SEAL, VIP, 6FR, LF

## (undated) DEVICE — SUTURE, ETHILON, 4-0, BLK, MONO, PS-2 18

## (undated) DEVICE — KIT, UPPER EXTREMITY, CUSTOM, PORTAGE

## (undated) DEVICE — CUFF, TOURNIQUET, 18 X 4, DUAL PORT/SNGL BLADDER, DISP, LF

## (undated) DEVICE — CATHETER, DIAGNOSTIC, DXTERITY, 6 FR, JL 4.0, 100C